# Patient Record
Sex: MALE | Race: WHITE | NOT HISPANIC OR LATINO | Employment: FULL TIME | URBAN - METROPOLITAN AREA
[De-identification: names, ages, dates, MRNs, and addresses within clinical notes are randomized per-mention and may not be internally consistent; named-entity substitution may affect disease eponyms.]

---

## 2017-01-10 ENCOUNTER — GENERIC CONVERSION - ENCOUNTER (OUTPATIENT)
Dept: OTHER | Facility: OTHER | Age: 57
End: 2017-01-10

## 2017-06-07 ENCOUNTER — ALLSCRIPTS OFFICE VISIT (OUTPATIENT)
Dept: OTHER | Facility: OTHER | Age: 57
End: 2017-06-07

## 2017-06-07 DIAGNOSIS — K21.9 GASTRO-ESOPHAGEAL REFLUX DISEASE WITHOUT ESOPHAGITIS: ICD-10-CM

## 2017-06-07 DIAGNOSIS — R07.9 CHEST PAIN: ICD-10-CM

## 2017-06-15 ENCOUNTER — TRANSCRIBE ORDERS (OUTPATIENT)
Dept: ADMINISTRATIVE | Facility: HOSPITAL | Age: 57
End: 2017-06-15

## 2017-06-15 ENCOUNTER — ALLSCRIPTS OFFICE VISIT (OUTPATIENT)
Dept: OTHER | Facility: OTHER | Age: 57
End: 2017-06-15

## 2017-06-15 DIAGNOSIS — R07.9 CHEST PAIN, UNSPECIFIED: Primary | ICD-10-CM

## 2017-06-15 DIAGNOSIS — K21.9 GASTROESOPHAGEAL REFLUX DISEASE WITHOUT ESOPHAGITIS: ICD-10-CM

## 2017-07-10 ENCOUNTER — HOSPITAL ENCOUNTER (OUTPATIENT)
Dept: CT IMAGING | Facility: HOSPITAL | Age: 57
Discharge: HOME | End: 2017-07-10
Payer: COMMERCIAL

## 2017-07-10 DIAGNOSIS — R07.9 CHEST PAIN: ICD-10-CM

## 2017-07-10 DIAGNOSIS — K21.9 GASTRO-ESOPHAGEAL REFLUX DISEASE WITHOUT ESOPHAGITIS: ICD-10-CM

## 2017-07-10 PROCEDURE — 71250 CT THORAX DX C-: CPT

## 2017-07-11 ENCOUNTER — GENERIC CONVERSION - ENCOUNTER (OUTPATIENT)
Dept: OTHER | Facility: OTHER | Age: 57
End: 2017-07-11

## 2017-07-13 ENCOUNTER — GENERIC CONVERSION - ENCOUNTER (OUTPATIENT)
Dept: OTHER | Facility: OTHER | Age: 57
End: 2017-07-13

## 2017-07-17 ENCOUNTER — TRANSCRIBE ORDERS (OUTPATIENT)
Dept: ADMINISTRATIVE | Facility: HOSPITAL | Age: 57
End: 2017-07-17

## 2017-07-17 DIAGNOSIS — R16.1 SPLENOMEGALY, NOT ELSEWHERE CLASSIFIED: ICD-10-CM

## 2017-07-17 DIAGNOSIS — N28.1 ACQUIRED CYST OF KIDNEY: ICD-10-CM

## 2017-07-17 DIAGNOSIS — N28.1 ACQUIRED CYST OF KIDNEY: Primary | ICD-10-CM

## 2017-07-17 DIAGNOSIS — R16.1 SPLENOMEGALY: Primary | ICD-10-CM

## 2017-07-24 ENCOUNTER — HOSPITAL ENCOUNTER (OUTPATIENT)
Dept: CT IMAGING | Facility: HOSPITAL | Age: 57
Discharge: HOME | End: 2017-07-24
Payer: COMMERCIAL

## 2017-07-24 DIAGNOSIS — R16.1 SPLENOMEGALY, NOT ELSEWHERE CLASSIFIED: ICD-10-CM

## 2017-07-24 PROCEDURE — 74177 CT ABD & PELVIS W/CONTRAST: CPT

## 2017-07-24 RX ADMIN — IOHEXOL 100 ML: 350 INJECTION, SOLUTION INTRAVENOUS at 17:19

## 2017-07-26 ENCOUNTER — GENERIC CONVERSION - ENCOUNTER (OUTPATIENT)
Dept: OTHER | Facility: OTHER | Age: 57
End: 2017-07-26

## 2017-07-31 ENCOUNTER — HOSPITAL ENCOUNTER (OUTPATIENT)
Dept: ULTRASOUND IMAGING | Facility: HOSPITAL | Age: 57
Discharge: HOME | End: 2017-07-31
Payer: COMMERCIAL

## 2017-07-31 DIAGNOSIS — N28.1 ACQUIRED CYST OF KIDNEY: ICD-10-CM

## 2017-07-31 PROCEDURE — 76770 US EXAM ABDO BACK WALL COMP: CPT

## 2017-08-04 ENCOUNTER — GENERIC CONVERSION - ENCOUNTER (OUTPATIENT)
Dept: OTHER | Facility: OTHER | Age: 57
End: 2017-08-04

## 2017-08-17 ENCOUNTER — HOSPITAL ENCOUNTER (OUTPATIENT)
Dept: MRI IMAGING | Facility: HOSPITAL | Age: 57
Discharge: HOME | End: 2017-08-17
Payer: COMMERCIAL

## 2017-08-17 DIAGNOSIS — N28.1 ACQUIRED CYST OF KIDNEY: ICD-10-CM

## 2017-08-17 PROCEDURE — A9585 GADOBUTROL INJECTION: HCPCS | Performed by: FAMILY MEDICINE

## 2017-08-17 PROCEDURE — 74183 MRI ABD W/O CNTR FLWD CNTR: CPT

## 2017-08-17 RX ADMIN — GADOBUTROL 9 ML: 604.72 INJECTION INTRAVENOUS at 14:57

## 2017-08-18 ENCOUNTER — GENERIC CONVERSION - ENCOUNTER (OUTPATIENT)
Dept: OTHER | Facility: OTHER | Age: 57
End: 2017-08-18

## 2017-10-17 ENCOUNTER — GENERIC CONVERSION - ENCOUNTER (OUTPATIENT)
Dept: OTHER | Facility: OTHER | Age: 57
End: 2017-10-17

## 2017-11-20 ENCOUNTER — GENERIC CONVERSION - ENCOUNTER (OUTPATIENT)
Dept: OTHER | Facility: OTHER | Age: 57
End: 2017-11-20

## 2017-12-05 ENCOUNTER — GENERIC CONVERSION - ENCOUNTER (OUTPATIENT)
Dept: FAMILY MEDICINE CLINIC | Facility: CLINIC | Age: 57
End: 2017-12-05

## 2017-12-11 ENCOUNTER — TRANSCRIBE ORDERS (OUTPATIENT)
Dept: ADMINISTRATIVE | Facility: HOSPITAL | Age: 57
End: 2017-12-11

## 2017-12-11 ENCOUNTER — ALLSCRIPTS OFFICE VISIT (OUTPATIENT)
Dept: OTHER | Facility: OTHER | Age: 57
End: 2017-12-11

## 2017-12-11 DIAGNOSIS — M10.9 GOUT: ICD-10-CM

## 2017-12-11 DIAGNOSIS — N28.1 ACQUIRED CYST OF KIDNEY: Primary | ICD-10-CM

## 2017-12-11 DIAGNOSIS — N28.1 ACQUIRED CYST OF KIDNEY: ICD-10-CM

## 2017-12-11 DIAGNOSIS — K21.9 GASTRO-ESOPHAGEAL REFLUX DISEASE WITHOUT ESOPHAGITIS: ICD-10-CM

## 2017-12-12 NOTE — PROGRESS NOTES
Assessment    1  Kidney cyst, acquired (593 2) (N28 1)   2  Esophageal reflux (530 81) (K21 9)    Plan  Esophageal reflux    · Follow-up visit in 6 months Evaluation and Treatment  Follow-up  Status: Hold For -Scheduling  Requested for: 87LHA3463   · (1) CBC/PLT/DIFF; Status:Active; Requested for:15Knq3734;    · (1) COMPREHENSIVE METABOLIC PANEL; Status:Active; Requested for:39Ajh6532;   Esophageal reflux, Gout, Kidney cyst, acquired    · (1) LIPID PANEL FASTING W DIRECT LDL REFLEX; Status:Active; Requestedfor:19Pzw9426;    · (1) TSH WITH FT4 REFLEX; Status:Active; Requested for:06Qne8742;    · (1) URIC ACID; Status:Active; Requested for:22Ujj4805;   Kidney cyst, acquired    · * MRI ABDOMEN W WO CONTRAST; Status:Need Information - Financial Authorization; Requested for:50Lem5923;     Chief Complaint  PT here for scheduled follow up, he reports his reflux has improved  PT would like to review last MRI findings and discuss when he should have up dated imaging  refused flu shot,PT aware he is due for CRC screening  PT having updated blood work this week  History of Present Illness  Patient follow-up on cyst on left kidney as well as reflux  Patient's chest pain is better overall  Patient is using AcipHex intermittently  no recurrence of chest pain  Patient urinating normally  No hematuria  No significant change in urinary frequency or dysuria  Patient feeling well overall  Review of Systems   Constitutional: No fever or chills, feels well, no tiredness, no recent weight gain or weight loss  Eyes: No complaints of eye pain, no red eyes, no discharge from eyes, no itchy eyes  ENT: no complaints of earache, no hearing loss, no nosebleeds, no nasal discharge, no sore throat, no hoarseness  Cardiovascular: No complaints of slow heart rate, no fast heart rate, no chest pain, no palpitations, no leg claudication, no lower extremity    Respiratory: No complaints of shortness of breath, no wheezing, no cough, no SOB on exertion, no orthopnea or PND  Gastrointestinal: No complaints of abdominal pain, no constipation, no nausea or vomiting, no diarrhea or bloody stools  Genitourinary: No complaints of dysuria, no incontinence, no hesitancy, no nocturia, no genital lesion, no testicular pain  Musculoskeletal: No complaints of arthralgia, no myalgias, no joint swelling or stiffness, no limb pain or swelling  Integumentary: No complaints of skin rash or skin lesions, no itching, no skin wound, no dry skin  Neurological: No compliants of headache, no confusion, no convulsions, no numbness or tingling, no dizziness or fainting, no limb weakness, no difficulty walking  Psychiatric: Is not suicidal, no sleep disturbances, no anxiety or depression, no change in personality, no emotional problems  Endocrine: No complaints of proptosis, no hot flashes, no muscle weakness, no erectile dysfunction, no deepening of the voice, no feelings of weakness  Hematologic/Lymphatic: No complaints of swollen glands, no swollen glands in the neck, does not bleed easily, no easy bruising  Active Problems  1  Abnormal blood chemistry (790 6) (R79 9)   2  Acid reflux (530 81) (K21 9)   3  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 (723 0)   4  Chest pain (786 50) (R07 9)   5  Esophageal reflux (530 81) (K21 9)   6  Esophagitis, reflux (530 11) (K21 0)   7  Gout (274 9) (M10 9)   8  Kidney cyst, acquired (593 2) (N28 1)   9  Lipoma (214 9) (D17 9)   10  Low vitamin D level (268 9) (E55 9)   11  Need for revaccination (V05 9) (Z23)   12  Splenomegaly (789 2) (R16 1)   13  Testicular hypogonadism (257 2) (E29 1)   14  Wellness examination (V70 0) (Z00 00)    Past Medical History  1  History of chest pain (V13 89) (Z87 898)   2  History of herpes simplex infection (V12 09) (Z86 19)    The active problems and past medical history were reviewed and updated today  Family History  Family History    1  Family history of Cancer   2   Family history of Diabetes Mellitus (V18 0)   3  Family history of Heart Disease (V17 49)   4  Family history of Hypertension (V17 49)   5  Family history of Parkinson Disease    Social History     · Alcohol Use (History)   · Denied: History of Drug Use   · Never A Smoker    Current Meds   1  Adult Aspirin Low Strength 81 MG TBDP; Therapy: (Recorded:02Oct2012) to Recorded   2  Allopurinol 300 MG Oral Tablet; take one tablet every other day; Therapy: 85KUL6890 to (Last Rx:07Jun2017)  Requested for: 07Jun2017; Status: ACTIVE - Transmit to Pharmacy - Awaiting Verification Ordered   3  Mens Multivitamin Plus TABS; TAKE 1 TABLET DAILY; Therapy: 48Jgy7383 to Recorded   4  Pantoprazole Sodium 40 MG Oral Tablet Delayed Release; TAKE 1 TABLET TWICE DAILY 30 MINUTES BEFORE BREAKFAST AND DINNER; Therapy: 53GDP5674 to (Evaluate:48Ctg2786)  Requested for: 02UGG9617; Last Rx:16Jun2017 Ordered   5  Vitamin D3 2000 UNIT Oral Capsule; Therapy: (Recorded:02Oct2012) to Recorded    The medication list was reviewed and updated today  Allergies  1  No Known Drug Allergies    Vitals  Vital Signs    Recorded: 07Jvl0098 03:32PM   Heart Rate 66, R Radial   Respiration 18   Systolic 428, RUE, Sitting   Diastolic 66, RUE, Sitting   Height 5 ft 10 in   Weight 195 lb    BMI Calculated 27 98   BSA Calculated 2 06       Physical Exam   Constitutional  General appearance: No acute distress, well appearing and well nourished  Eyes  Conjunctiva and lids: No swelling, erythema, or discharge  Pupils and irises: Equal, round and reactive to light  Ears, Nose, Mouth, and Throat  External inspection of ears and nose: Normal    Otoscopic examination: Tympanic membrance translucent with normal light reflex  Canals patent without erythema  Nasal mucosa, septum, and turbinates: Normal without edema or erythema  Oropharynx: Normal with no erythema, edema, exudate or lesions  Pulmonary  Respiratory effort: No increased work of breathing or signs of respiratory distress  Auscultation of lungs: Clear to auscultation, equal breath sounds bilaterally, no wheezes, no rales, no rhonci  Cardiovascular  Palpation of heart: Normal PMI, no thrills  Auscultation of heart: Normal rate and rhythm, normal S1 and S2, without murmurs  Examination of extremities for edema and/or varicosities: Normal    Carotid pulses: Normal    Abdomen  Abdomen: Non-tender, no masses  Liver and spleen: No hepatomegaly or splenomegaly  Lymphatic  Palpation of lymph nodes in neck: No lymphadenopathy  Musculoskeletal  Gait and station: Normal    Digits and nails: Normal without clubbing or cyanosis  Inspection/palpation of joints, bones, and muscles: Normal    Skin  Skin and subcutaneous tissue: Normal without rashes or lesions  Neurologic  Cranial nerves: Cranial nerves 2-12 intact  Reflexes: 2+ and symmetric  Sensation: No sensory loss  Psychiatric  Orientation to person, place and time: Normal    Mood and affect: Normal          Results/Data  PHQ-2 Adult Depression Screening 11Lut4154 03:37PM User, s     Test Name Result Flag Reference   PHQ-2 Adult Depression Score 0       Over the last two weeks, how often have you been bothered by any of the following problems?  Little interest or pleasure in doing things: Not at all - 0 Feeling down, depressed, or hopeless: Not at all - 0   PHQ-2 Adult Depression Screening Negative           Signatures   Electronically signed by : Bryson Burgos DO; Dec 11 2017  3:57PM EST                       (Author)

## 2018-01-09 NOTE — RESULT NOTES
Verified Results  * CT ABDOMEN PELVIS W CONTRAST [id number] 04:52PM Catina Navarro Order Number: [id number]    - Patient Instructions: To schedule this appointment, please contact Central Scheduling at [phone]  Test Name Result Flag Reference   CT ABDOMEN PELVIS W CONTRAST (Report)     This is a summary report  The complete report is available in the patient's medical record  If you cannot access the medical record, please contact the sending organization for a detailed fax or copy  CT ABDOMEN AND PELVIS WITH IV CONTRAST     INDICATION: Follow up splenomegaly     COMPARISON: CT of the chest from 7/10/2017     TECHNIQUE: CT examination of the abdomen and pelvis was performed  Reformatted images were created in axial, sagittal, and coronal planes  Radiation dose length product (DLP) for this visit: 1354 34 mGy-cm   This examination, like all CT scans performed in the [facility], was performed utilizing techniques to minimize radiation dose exposure, including the use of    iterative reconstruction and automated exposure control  IV Contrast: 100 mL of iohexol (OMNIPAQUE)      Enteric Contrast: Enteric contrast was administered  FINDINGS:     ABDOMEN     LOWER CHEST: No significant abnormalities identified in the lower chest      LIVER/BILIARY TREE: Unremarkable  GALLBLADDER: No calcified gallstones  No pericholecystic inflammatory change  SPLEEN: The spleen again measures large measuring 14 8 cm  PANCREAS: Unremarkable  ADRENAL GLANDS: Unremarkable  KIDNEYS/URETERS: There are two left renal lesions which measure solid in attenuation though do not changed substantially on delayed images and are possible hemorrhagic cysts  These were not imaged on the prior chest CT  these measure 1 4 x 1 6 cm and    1 0 x 1 1 cm  There are left peripelvic cysts  No hydronephrosis  STOMACH AND BOWEL: There is diverticulosis coli       APPENDIX: No findings to suggest appendicitis  ABDOMINOPELVIC CAVITY: No ascites or free intraperitoneal air  No lymphadenopathy  VESSELS: Unremarkable for patient's age  PELVIS     REPRODUCTIVE ORGANS: Unremarkable for patient's age  URINARY BLADDER: Unremarkable  ABDOMINAL WALL/INGUINAL REGIONS: Unremarkable  OSSEOUS STRUCTURES: No acute fracture or destructive osseous lesion  IMPRESSION:     1  Mild splenomegaly without associated lymphadenopathy or evidence of liver disease  2  Indeterminate lesions in the left kidney, possibly hemorrhagic cysts though confirmation with ultrasound is recommended  3   Diverticulosis coli       ##sigslh##sigslh     ##fuslh01##fuslh01       Workstation performed: [id number]     Signed by:   Buck Mckeon MD   7/26/17   RAD_DOSE   Modality Radiation Exposure Data    Order Radiation    Type Dose Range    Radiation Dose 1354 34 mGy-cm 0 - 6000 mGy-cm

## 2018-01-12 VITALS
TEMPERATURE: 98.4 F | DIASTOLIC BLOOD PRESSURE: 62 MMHG | HEIGHT: 70 IN | WEIGHT: 200.25 LBS | BODY MASS INDEX: 28.67 KG/M2 | SYSTOLIC BLOOD PRESSURE: 110 MMHG

## 2018-01-12 NOTE — PROGRESS NOTES
Assessment    1  Encounter for preventive health examination (V70 0) (Z00 00)   2  Acid reflux (530 81) (K21 9)   3  Lipoma (214 9) (D17 9)    Plan  Acid reflux    · (1) CBC/PLT/DIFF; Status:Active; Requested WMX:97TJT9834;    · (1) COMPREHENSIVE METABOLIC PANEL; Status:Active; Requested PFI:86NPP7197;    · (1) LIPID PANEL FASTING W DIRECT LDL REFLEX; Status:Active; Requested  YLU:49XVN3047;    · (1) TSH WITH FT4 REFLEX; Status:Active; Requested EFP:73DJZ2692;   Esophageal reflux    · RABEprazole Sodium 20 MG Oral Tablet Delayed Release (Aciphex); Take 1  tablet twice daily as needed  Gout    · Allopurinol 300 MG Oral Tablet; take one tablet every other day    Discussion/Summary    Patient will try to avoid coffees and teas, arcelia, high fatty foods, peppermint, chocolate, tomato-based foods, citrus based foods  Patient will restart/continue AcipHex daily  Refills given  Patient will call Dr Everett Cleaves surgeon to have removal of lipoma  Chief Complaint  Patient present's today for annual well exam  complains of chest tighten for 10 days , increased within the last 2 days      History of Present Illness  HPI: Patient is here for wellness exam  Patient has developed some upper chest wall discomfort and neck discomfort along with hoarseness over the past 2 days  Patient has seeing cardiology in the past for other cardiac issues  Patient without shortness of breath, diaphoresis or nausea or vomiting associated with this  Patient notices this at rest but not with exertion  He she and has had full cardiac workup including catheterization which was normal  Patient just restarted AcipHex 2 days ago  Review of Systems    Constitutional: No fever or chills, feels well, no tiredness, no recent weight gain or weight loss  Eyes: No complaints of eye pain, no red eyes, no discharge from eyes, no itchy eyes  ENT: no complaints of earache, no hearing loss, no nosebleeds, no nasal discharge, no sore throat, no hoarseness  Cardiovascular: No complaints of slow heart rate, no fast heart rate, no chest pain, no palpitations, no leg claudication, no lower extremity  Respiratory: No complaints of shortness of breath, no wheezing, no cough, no SOB on exertion, no orthopnea or PND  Gastrointestinal: as noted in HPI  Genitourinary: No complaints of dysuria, no incontinence, no hesitancy, no nocturia, no genital lesion, no testicular pain  Musculoskeletal: No complaints of arthralgia, no myalgias, no joint swelling or stiffness, no limb pain or swelling  Integumentary: No complaints of skin rash or skin lesions, no itching, no skin wound, no dry skin  Neurological: No compliants of headache, no confusion, no convulsions, no numbness or tingling, no dizziness or fainting, no limb weakness, no difficulty walking  Psychiatric: Is not suicidal, no sleep disturbances, no anxiety or depression, no change in personality, no emotional problems  Endocrine: No complaints of proptosis, no hot flashes, no muscle weakness, no erectile dysfunction, no deepening of the voice, no feelings of weakness  Hematologic/Lymphatic: No complaints of swollen glands, no swollen glands in the neck, does not bleed easily, no easy bruising  Active Problems    1  Abnormal blood chemistry (790 6) (R79 9)   2  Acid reflux (530 81) (K21 9)   3  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 (723 0)   4  Chest pain (786 50) (R07 9)   5  Esophageal reflux (530 81) (K21 9)   6  Esophagitis, reflux (530 11) (K21 0)   7  Gout (274 9) (M10 9)   8  Low vitamin D level (268 9) (E55 9)   9  Need for revaccination (V05 9) (Z23)   10  Testicular hypogonadism (257 2) (E29 1)   11   Wellness examination (V70 0) (Z00 00)    Past Medical History    · History of chest pain (V13 89) (S21 724)   · History of herpes simplex infection (V12 09) (Z86 19)    Family History  Family History    · Family history of Cancer   · Family history of Diabetes Mellitus (V18 0)   · Family history of Heart Disease (V17 49)   · Family history of Hypertension (V17 49)   · Family history of Parkinson Disease    Social History    · Alcohol Use (History)   · Denied: History of Drug Use   · Never A Smoker    Current Meds   1  Adult Aspirin Low Strength 81 MG TBDP; Therapy: (Recorded:02Oct2012) to Recorded   2  Allopurinol 300 MG Oral Tablet; take one tablet every other day; Therapy: 63PUX3342 to (Last Rx:09Jun2016)  Requested for: 44AGU1044 Ordered   3  Mens Multivitamin Plus TABS; TAKE 1 TABLET DAILY; Therapy: 61Yqv2722 to Recorded   4  RABEprazole Sodium 20 MG Oral Tablet Delayed Release; Take 1 tablet twice daily as   needed; Therapy: 96ZPY8262 to (Evaluate:10Aug2015)  Requested for: 27SHM3954 Recorded   5  Vitamin D3 2000 UNIT Oral Capsule; Therapy: (Recorded:02Oct2012) to Recorded    Allergies    1  No Known Drug Allergies    Vitals   Recorded: 51XUA6697 04:12PM   Temperature 98 4 F, Tympanic   Systolic 759, LUE, Sitting   Diastolic 62, LUE, Sitting   Height 5 ft 10 in   Weight 200 lb 4 oz   BMI Calculated 28 73   BSA Calculated 2 09     Physical Exam    Constitutional   General appearance: No acute distress, well appearing and well nourished  Eyes   Conjunctiva and lids: No swelling, erythema, or discharge  Pupils and irises: Equal, round and reactive to light  Ears, Nose, Mouth, and Throat   External inspection of ears and nose: Normal     Otoscopic examination: Tympanic membrance translucent with normal light reflex  Canals patent without erythema  Oropharynx: Normal with no erythema, edema, exudate or lesions  Pulmonary   Respiratory effort: No increased work of breathing or signs of respiratory distress  Auscultation of lungs: Clear to auscultation  Cardiovascular   Palpation of heart: Normal PMI, no thrills  Auscultation of heart: Normal rate and rhythm, normal S1 and S2, without murmurs      Examination of extremities for edema and/or varicosities: Normal     Abdomen Abdomen: Non-tender, no masses  Liver and spleen: No hepatomegaly or splenomegaly  Lymphatic   Palpation of lymph nodes in neck: No lymphadenopathy  Musculoskeletal   Gait and station: Normal     Digits and nails: Normal without clubbing or cyanosis  Inspection/palpation of joints, bones, and muscles: Normal     Skin   Skin and subcutaneous tissue: Abnormal   Lipoma left arm  Neurologic   Cranial nerves: Cranial nerves 2-12 intact  Reflexes: 2+ and symmetric  Sensation: No sensory loss      Psychiatric   Orientation to person, place and time: Normal     Mood and affect: Normal        Signatures   Electronically signed by : Devi Marie DO; Jun 7 2017  4:55PM EST                       (Author)

## 2018-01-14 VITALS
SYSTOLIC BLOOD PRESSURE: 124 MMHG | DIASTOLIC BLOOD PRESSURE: 72 MMHG | HEIGHT: 70 IN | BODY MASS INDEX: 28.64 KG/M2 | TEMPERATURE: 97.8 F | WEIGHT: 200.06 LBS

## 2018-01-15 ENCOUNTER — HOSPITAL ENCOUNTER (OUTPATIENT)
Dept: MRI IMAGING | Facility: HOSPITAL | Age: 58
Discharge: HOME | End: 2018-01-15
Payer: COMMERCIAL

## 2018-01-15 ENCOUNTER — GENERIC CONVERSION - ENCOUNTER (OUTPATIENT)
Dept: OTHER | Facility: OTHER | Age: 58
End: 2018-01-15

## 2018-01-15 DIAGNOSIS — N28.1 ACQUIRED CYST OF KIDNEY: ICD-10-CM

## 2018-01-15 PROCEDURE — 74183 MRI ABD W/O CNTR FLWD CNTR: CPT

## 2018-01-15 PROCEDURE — A9585 GADOBUTROL INJECTION: HCPCS | Performed by: RADIOLOGY

## 2018-01-15 RX ADMIN — GADOBUTROL 10 ML: 604.72 INJECTION INTRAVENOUS at 09:10

## 2018-01-15 NOTE — RESULT NOTES
Verified Results  [address] [place] [address] [place] [id number] 01:32PM Sylvia Kehr Order Number: [id number]    - Patient Instructions: To schedule this appointment, please contact Central Scheduling at [phone]  Test Name Result Flag Reference   US KIDNEY AND BLADDER (Report)     RENAL ULTRASOUND     INDICATION: Follow-up left kidney cyst seen on CT on 7/24/2017  COMPARISON: Abdomen and pelvic CT from 7/24/2017  TECHNIQUE:  Ultrasound of the retroperitoneum was performed with a curvilinear transducer utilizing volumetric sweeps and still imaging techniques  FINDINGS:     KIDNEYS:   Symmetric and normal size  Right kidney: 10 9 cm  Normal echogenicity and contour  No suspicious masses detected  No hydronephrosis  No shadowing calculi  No perinephric fluid collections  Left kidney: 12 4 cm  Normal echogenicity and contour  One of the indeterminate lesions seen in the left in the lower pole described on prior CT is a 1 8 x 1 7 x 1 5 cm hyperechoic lesion (image [id number]) while the other is a 1 1 x 1 3 x 1 0 cm simple cyst      No hydronephrosis  No shadowing calculi  No perinephric fluid collections  URETERS:   Nonvisualized  BLADDER:    Normally distended  No focal thickening or mass lesions  Bilateral ureteral jets detected  IMPRESSION:     One of the left kidney lower pole lesions described on the prior CT is a simple cyst, but the other lesion is a 1 8 x 1 7 x 1 5 cm lesion that is hyperechoic on ultrasound therefore remains indeterminate  (image [id number]) Recommend MRI of the abdomen with    and without IV contrast to characterize         ##sigslh##sigslh            Workstation performed: [id number]     Signed by:   Poppy Elliott MD   8/3/17

## 2018-01-16 NOTE — RESULT NOTES
Verified Results  * MRI ABDOMEN W WO CONTRAST [id number] 01:18PM Allen Gonzalez Order Number: [id number]   Performing Comments: R/O CANCER PER U/S RESULTS   - Patient Instructions: To schedule this appointment, please contact Central Scheduling at [phone]  Test Name Result Flag Reference   MRI ABDOMEN W [address] (Report)     This is a summary report  The complete report is available in the patient's medical record  If you cannot access the medical record, please contact the sending organization for a detailed fax or copy  MRI OF THE ABDOMEN (KIDNEYS) WITH AND WITHOUT CONTRAST     INDICATION: Follow-up renal lesions  COMPARISON: Prior ultrasound dated July 31, 2017  Prior CT dated July 24, 2017     TECHNIQUE: The following pulse sequences were obtained on a 1 5 T scanner: Coronal and axial T2 with TE of 90 and 180 respectively, axial T2 with fat saturation, axial FIESTA fat-sat, axialT1-weighted in-and-out-of phase, pre-contrast axial T1 with fat   saturation, thin-section post-contrast dynamic axial T1 with fat saturation at 20, 70, and 180 seconds, followed by coronal T1 with fat saturation and 7-minute delayed axial T1 with fat saturation  Subtraction images of the kidneys were obtained  IV Contrast: 9 mL of gadobutrol injection (MULTI-DOSE)      FINDINGS:     KIDNEYS:    Right:   Normal in size and contour with prompt enhancement  No hydronephrosis  No cystic or solid renal mass identified  Left:   Normal in size and contour with prompt enhancement  There are numerous parapelvic cysts without enhancement  No hydronephrosis  There are two lesions observed arising from the lateral lower pole cortex as follows:     1  The smaller more exophytic and more anteriorly located lesion currently measures 1 2 cm and demonstrates increased T2 and mildly increased T1 signal with some layering of higher T1 signal dependently and demonstrates no enhancement   This lesion    appeared more cystic on the prior ultrasound  This is compatible with a hemorrhagic or proteinaceous cyst      2  The larger, less exophytic, and more posteriorly located lesion measures 1 4 cm on current study and demonstrates heterogeneous T2 signal, mostly diminished T2 signal and intermediate T1 signal similar to background renal parenchyma prior to contrast   administration  After contrast administration, there is no appreciable enhancement demonstrated  No intralesional fat is demonstrated  This lesion appeared more echogenic on the prior ultrasound  LIVER: Normal      BILIARY TREE: Normal       GALLBLADDER: Normal      PANCREAS: Normal      ADRENAL GLANDS: Normal      SPLEEN: Enlarged measuring between 14 5 cm     ABDOMINAL CAVITY: No lymphadenopathy or mass  BOWEL: Colonic diverticulosis without evidence of diverticulitis  OSSEOUS STRUCTURES: No osseous destruction  VASCULAR STRUCTURES: Visualized vasculature is normal      ABDOMINAL WALL: Small fat-containing umbilical hernia  LUNG BASES: Atelectatic changes both lung bases  IMPRESSION:   There are multiple left-sided renal lesions including multiple parapelvic cysts and a lower pole exophytic hemorrhagic/proteinaceous cyst      The more posteriorly located, slightly larger, and less exophytic left lower pole renal lesion has indeterminate precontrast imaging characteristics but does not demonstrate any significant enhancement and therefore is very likely benign  Short interval   follow-up recommended in 4-6 months either via CT or preferably MRI to document stability  ##sigslh##sigslh   ##fuslh6##fuslh6        Workstation performed: [id number]     Signed by:    Debbie Chamberlain DO   8/18/17       Signatures   Electronically signed by : DAVE Horta ; Aug 18 2017  4:12PM EST                       (Author)

## 2018-01-23 ENCOUNTER — ALLSCRIPTS OFFICE VISIT (OUTPATIENT)
Dept: OTHER | Facility: OTHER | Age: 58
End: 2018-01-23

## 2018-01-23 VITALS
SYSTOLIC BLOOD PRESSURE: 126 MMHG | HEIGHT: 70 IN | RESPIRATION RATE: 18 BRPM | HEART RATE: 66 BPM | BODY MASS INDEX: 27.92 KG/M2 | DIASTOLIC BLOOD PRESSURE: 66 MMHG | WEIGHT: 195 LBS

## 2018-01-23 DIAGNOSIS — M54.50 LOW BACK PAIN: ICD-10-CM

## 2018-01-23 NOTE — RESULT NOTES
Verified Results  * MRI ABDOMEN W WO CONTRAST [id number] 07:53AM Oxana Olvera Order Number: [id number]    - Patient Instructions: To schedule this appointment, please contact Central Scheduling at [phone]  Test Name Result Flag Reference   MRI ABDOMEN W WO CONTRAST (Report)     MRI OF THE ABDOMEN (KIDNEYS) WITH AND WITHOUT CONTRAST     INDICATION: Dx: Acquired cyst of kidney [N28 1 (ICD-10-CM)]     COMPARISON: Comparison primarily made with MR from 8/17/2017  Abdomen and pelvic CT from 7/24/2017 was also reviewed  TECHNIQUE: The following pulse sequences were obtained on a 1 5 T scanner: Coronal and axial T2 with TE of 90 and 180 respectively, axial T2 with fat saturation, axial FIESTA fat-sat, axialT1-weighted in-and-out-of phase, pre-contrast axial T1 with fat   saturation, thin-section post-contrast dynamic axial T1 with fat saturation at 20, 70, and 180 seconds, followed by coronal T1 with fat saturation and 7-minute delayed axial T1 with fat saturation  Subtraction images of the kidneys were obtained  IV Contrast: 10 mL of gadobutrol injection (MULTI-DOSE)      FINDINGS:     KIDNEYS:    Right:   Normal in size and contour with prompt enhancement  No hydronephrosis  No cystic or solid renal mass identified  Left:   Normal in size and contour with prompt enhancement  No hydronephrosis  Again seen are 2 mildly complex left renal lesions  The 1st is a 1 3 cm exophytic lesion of the left kidney lower pole, which is T2 hyperintense, T1 hyperintense, and does not enhance  This is unchanged in size  (Series 6 image 30 )     The 2nd is also a 1 4 cm left kidney lower pole lesion, located immediately posterior to the previously described cyst  This is hypointense on T2-weighted sequences, hypointense on T1-weighted sequences, and does not enhance  This is also unchanged in    size   (Series 6 image 30 )     LIVER: Normal      BILIARY TREE: Normal       GALLBLADDER: Normal  PANCREAS: Normal      ADRENAL GLANDS: Normal      SPLEEN: Normal      ABDOMINAL CAVITY: No lymphadenopathy or mass  BOWEL: Unremarkable MRI appearance  OSSEOUS STRUCTURES: No osseous destruction  VASCULAR STRUCTURES: Visualized vasculature is normal      ABDOMINAL WALL: Normal      LUNG BASES: Unremarkable MRI appearance  IMPRESSION:     Both left kidney lower pole minimally complex cysts are stable and do no enhance  These are both consistent with hemorrhagic or proteinaceous cysts  There is no need for further followup or workup         Workstation performed: [id number]     Signed by:   Ellie Molina MD   1/15/18

## 2018-01-24 NOTE — PROGRESS NOTES
Assessment   1  Chronic lumbar pain (724 2,338 29) (M54 5,G89 29)    Plan   Chronic lumbar pain    · Cyclobenzaprine HCl - 10 MG Oral Tablet; TAKE 1 TABLET AT BEDTIME   · Meloxicam 15 MG Oral Tablet; TAKE 1 TABLET DAILY WITH FOOD   · *1 - [facility]  *  Status: Active  Requested for: 58DAZ4388  Care Summary provided  : Yes   · Follow-up visit in 6 weeks Evaluation and Treatment  Follow-up  Status: Hold For -    Scheduling  Requested for: 17PJU0149    Chief Complaint   PT in office today , complaints of sudden flair up of lumbar pain and getting to a standing position, No fall or injury related  History of Present Illness   Patient With lumbar pain for 1-2 months  the normal urination defecation  No fevers or rash  patient will use Aleve intermittently  mild tightness continuously      Review of Systems        Constitutional: No fever or chills, feels well, no tiredness, no recent weight gain or weight loss  Eyes: No complaints of eye pain, no red eyes, no discharge from eyes, no itchy eyes  ENT: no complaints of earache, no hearing loss, no nosebleeds, no nasal discharge, no sore throat, no hoarseness  Cardiovascular: No complaints of slow heart rate, no fast heart rate, no chest pain, no palpitations, no leg claudication, no lower extremity  Respiratory: No complaints of shortness of breath, no wheezing, no cough, no SOB on exertion, no orthopnea or PND  Gastrointestinal: No complaints of abdominal pain, no constipation, no nausea or vomiting, no diarrhea or bloody stools  Genitourinary: No complaints of dysuria, no incontinence, no hesitancy, no nocturia, no genital lesion, no testicular pain  Musculoskeletal: as noted in HPI  Integumentary: No complaints of skin rash or skin lesions, no itching, no skin wound, no dry skin        Neurological: No compliants of headache, no confusion, no convulsions, no numbness or tingling, no dizziness or fainting, no limb weakness, no difficulty walking  Psychiatric: Is not suicidal, no sleep disturbances, no anxiety or depression, no change in personality, no emotional problems  Endocrine: No complaints of proptosis, no hot flashes, no muscle weakness, no erectile dysfunction, no deepening of the voice, no feelings of weakness  Hematologic/Lymphatic: No complaints of swollen glands, no swollen glands in the neck, does not bleed easily, no easy bruising  Active Problems   1  Abnormal blood chemistry (790 6) (R79 9)   2  Acid reflux (530 81) (K21 9)   3  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 (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 0)   4  Chest pain (786 50) (R07 9)   5  Esophageal reflux (530 81) (K21 9)   6  Esophagitis, reflux (530 11) (K21 0)   7  Gout (274 9) (M10 9)   8  Kidney cyst, acquired (593 2) (N28 1)   9  Lipoma (214 9) (D17 9)   10  Low vitamin D level (268 9) (E55 9)   11  Need for revaccination (V05 9) (Z23)   12  Splenomegaly (789 2) (R16 1)   13  Testicular hypogonadism (257 2) (E29 1)   14  Wellness examination (V70 0) (Z00 00)    Past Medical History   1  History of chest pain (V13 89) (Z87 898)   2  History of herpes simplex infection (V12 09) (Z86 19)     The active problems and past medical history were reviewed and updated today  Family History   Family History    1  Family history of Cancer   2  Family history of Diabetes Mellitus (V18 0)   3  Family history of Heart Disease (V17 49)   4  Family history of Hypertension (V17 49)   5  Family history of Parkinson Disease    Social History    · Alcohol Use (History)   · Denied: History of Drug Use   · Never A Smoker    Current Meds    1  Adult Aspirin Low Strength 81 MG TBDP; Therapy: (Recorded:02Oct2012) to Recorded   2  Allopurinol 300 MG Oral Tablet; take one tablet every other day; Therapy: 17NDP0052 to (Last Rx:07Jun2017)  Requested for: 07Jun2017; Status: ACTIVE     - Transmit to Pharmacy - Awaiting Verification Ordered   3   Mens Multivitamin Plus TABS; TAKE 1 TABLET DAILY; Therapy: 08Hbs5975 to Recorded   4  Pantoprazole Sodium 40 MG Oral Tablet Delayed Release; TAKE 1 TABLET TWICE DAILY     30 MINUTES BEFORE BREAKFAST AND DINNER; Therapy: 14OIF7982 to (Evaluate:35Nfa4321)  Requested for: 74OGA3728; Last     Rx:78Enf1650 Ordered   5  Vitamin D3 2000 UNIT Oral Capsule; Therapy: (Recorded:02Oct2012) to Recorded     The medication list was reviewed and updated today  Allergies   1  No Known Drug Allergies    Vitals   Vital Signs    Recorded: 61ZGG5196 03:05PM   Heart Rate 66, R Radial   Respiration 16   Systolic 517, LUE, Sitting   Diastolic 68, LUE, Sitting   Height 5 ft 10 in   Weight 196 lb    BMI Calculated 28 12   BSA Calculated 2 07     Physical Exam        Constitutional      General appearance: No acute distress, well appearing and well nourished  Eyes      Conjunctiva and lids: No swelling, erythema, or discharge  Pupils and irises: Equal, round and reactive to light  Ears, Nose, Mouth, and Throat      External inspection of ears and nose: Normal        Otoscopic examination: Tympanic membrance translucent with normal light reflex  Canals patent without erythema  Nasal mucosa, septum, and turbinates: Normal without edema or erythema  Oropharynx: Normal with no erythema, edema, exudate or lesions  Pulmonary      Respiratory effort: No increased work of breathing or signs of respiratory distress  Auscultation of lungs: Clear to auscultation, equal breath sounds bilaterally, no wheezes, no rales, no rhonci  Cardiovascular      Palpation of heart: Normal PMI, no thrills  Auscultation of heart: Normal rate and rhythm, normal S1 and S2, without murmurs  Examination of extremities for edema and/or varicosities: Normal        Carotid pulses: Normal        Abdomen      Abdomen: Non-tender, no masses  Liver and spleen: No hepatomegaly or splenomegaly         Lymphatic Palpation of lymph nodes in neck: No lymphadenopathy  Musculoskeletal      Gait and station: Normal        Digits and nails: Normal without clubbing or cyanosis  Inspection/palpation of joints, bones, and muscles: Normal  -- patient with some paravertebral muscle spasm bilaterally thoracolumbar region  No pain with palpation in the thoracolumbar region  Negative straight leg raise bilaterally  Skin      Skin and subcutaneous tissue: Normal without rashes or lesions  Neurologic      Cranial nerves: Cranial nerves 2-12 intact  Reflexes: 2+ and symmetric  Sensation: No sensory loss         Psychiatric      Orientation to person, place and time: Normal        Mood and affect: Normal           Future Appointments      Date/Time Provider Specialty Site   06/11/2018 03:15 PM Gómez Davis DO Family Medicine  [facility]     Signatures    Electronically signed by : Ralf Lamb DO; Jan 23 2018  3:19PM EST                       (Author)

## 2018-01-29 ENCOUNTER — EVALUATION (OUTPATIENT)
Dept: PHYSICAL THERAPY | Facility: CLINIC | Age: 58
End: 2018-01-29
Payer: COMMERCIAL

## 2018-01-29 DIAGNOSIS — M54.5 ACUTE LOW BACK PAIN, UNSPECIFIED BACK PAIN LATERALITY, WITH SCIATICA PRESENCE UNSPECIFIED: Primary | ICD-10-CM

## 2018-01-29 PROCEDURE — 97162 PT EVAL MOD COMPLEX 30 MIN: CPT | Performed by: PHYSICAL THERAPIST

## 2018-01-29 PROCEDURE — G8990 OTHER PT/OT CURRENT STATUS: HCPCS | Performed by: PHYSICAL THERAPIST

## 2018-01-29 PROCEDURE — 97140 MANUAL THERAPY 1/> REGIONS: CPT | Performed by: PHYSICAL THERAPIST

## 2018-01-29 PROCEDURE — 97110 THERAPEUTIC EXERCISES: CPT | Performed by: PHYSICAL THERAPIST

## 2018-01-29 PROCEDURE — G8991 OTHER PT/OT GOAL STATUS: HCPCS | Performed by: PHYSICAL THERAPIST

## 2018-01-29 RX ORDER — CYCLOBENZAPRINE HCL 10 MG
10 TABLET ORAL
COMMUNITY
End: 2018-02-20 | Stop reason: ALTCHOICE

## 2018-01-29 RX ORDER — MELOXICAM 15 MG/1
15 TABLET ORAL DAILY
COMMUNITY
End: 2019-05-14 | Stop reason: ALTCHOICE

## 2018-01-29 NOTE — PROGRESS NOTES
PT Evaluation     Today's date: 2018  Patient name: Jose Juan Tillman  : 1960  MRN: [id number]  Referring provider: Tony Boateng DO  Dx:   Encounter Diagnosis   Name Primary?  Chronic low back pain, unspecified back pain laterality, with sciatica presence unspecified Yes                  Assessment  Impairments: abnormal or restricted ROM, impaired physical strength, lacks appropriate home exercise program and pain with function  Other impairment: soft tissue restrictions and muscle imbalances    Patient is irritable  Assessment details: Patient is a 65year old male who presents to PT with acute LBP  Patient would benefit from skilled PT intervention to address his impairments and to maximize function  Barriers to therapy: none  Understanding of Dx/Px/POC: good   Prognosis: good    Goals  STG;  1: decrease pain by 50% in 4 weeks  2: full pain free ROM in 4 weeks    LT- sit to stand without pain in 4-8 weeks  2- return to working out and running without pain in 8 weeks        Subjective Evaluation    History of Present Illness  Mechanism of injury: Patient reports a 2-3 month history of lower back pain  Pain is described as a tightness/tension  Primarily located on the right side  Increase with sit to stand transfer but then soon resolves  Difficulty at times with sleeping  Hernia surgery 15 years ago     Not a recurrent problem Quality of life: good    Pain  Current pain rating: 3  At best pain ratin  At worst pain ratin  Quality: dull ache  Relieving factors: change in position  Aggravating factors: sitting  Progression: no change      Diagnostic Tests  No diagnostic tests performed  Treatments  Previous treatment: medication  Current treatment: medication  Patient Goals  Patient goals for therapy: increased strength, decreased pain, independence with ADLs/IADLs and return to sport/leisure activities          Objective     Static Posture     Comments  Posture: left illiac crest higher on left  Lumbar AROM: WNL  Double leg squat: WNL  MMT: 3+/5 left hip ER   (-) slump test b/l  (-) SLR test on right - increased posterior compartment tightness noted  Limited hip IR on right  Severe tenderness to palpation right psoas, b/l abdominal/visceral adhesion left upper and right lower   Left Rectus femoris tightness, right piriformis tightness   Lacking >45 degrees hip extension b/l   Poor core strength      Precautions: none    Daily Treatment Diary     Manual  1/29                         STM to viscera and right psoas 10 min                                                       Exercise Diary                           Hip flexor stretch at EOT  Standing piriformis stretch 2 min b/l  2 min b/l                                                                                                                                                                                                                                                          Modalities                                                               Flowsheet Rows    Flowsheet Row Most Recent Value   PT G-Codes   Current Score  57   Projected Score  72   FOTO information reviewed  Yes   Assessment Type  Evaluation   G code set  Other PT Primary   Other PT Primary Current Status ()  CK   Other PT Primary Goal Status ()  CJ

## 2018-01-30 ENCOUNTER — APPOINTMENT (OUTPATIENT)
Dept: PHYSICAL THERAPY | Facility: CLINIC | Age: 58
End: 2018-01-30
Payer: COMMERCIAL

## 2018-02-01 ENCOUNTER — OFFICE VISIT (OUTPATIENT)
Dept: PHYSICAL THERAPY | Facility: CLINIC | Age: 58
End: 2018-02-01
Payer: COMMERCIAL

## 2018-02-01 DIAGNOSIS — M54.5 ACUTE LOW BACK PAIN, UNSPECIFIED BACK PAIN LATERALITY, WITH SCIATICA PRESENCE UNSPECIFIED: Primary | ICD-10-CM

## 2018-02-01 PROCEDURE — 97140 MANUAL THERAPY 1/> REGIONS: CPT | Performed by: PHYSICAL THERAPIST

## 2018-02-01 PROCEDURE — 97110 THERAPEUTIC EXERCISES: CPT | Performed by: PHYSICAL THERAPIST

## 2018-02-01 PROCEDURE — 97010 HOT OR COLD PACKS THERAPY: CPT | Performed by: PHYSICAL THERAPIST

## 2018-02-01 NOTE — PROGRESS NOTES
Daily Note     Today's date: 2018  Patient name: Jose Juan Tillman  : 1960  MRN: [id number]  Referring provider: Tony Boateng DO  Dx:   Encounter Diagnosis   Name Primary?  Acute low back pain, unspecified back pain laterality, with sciatica presence unspecified Yes                  Subjective: Reports sleeping much better since last tx session  Objective: See treatment diary below      Assessment: Tolerated treatment well  Patient would benefit from continued PT      Plan: Progress treatment as tolerated           Manual                          STM to viscera and right psoas 10 min 15 min                                                      Exercise Diary                           Hip flexor stretch at EOT   2 min b/l   2 min  b/l           Standing piriformis stretch 2 min b/l 2 min b/l           Prone RF stretch with towel roll under knee  3 min each                                                                                                                                                                                                                               Modalities                           MHP 10 min  10 min pre tx

## 2018-02-05 ENCOUNTER — OFFICE VISIT (OUTPATIENT)
Dept: PHYSICAL THERAPY | Facility: CLINIC | Age: 58
End: 2018-02-05
Payer: COMMERCIAL

## 2018-02-05 DIAGNOSIS — M54.5 ACUTE LOW BACK PAIN, UNSPECIFIED BACK PAIN LATERALITY, WITH SCIATICA PRESENCE UNSPECIFIED: Primary | ICD-10-CM

## 2018-02-05 PROCEDURE — 97010 HOT OR COLD PACKS THERAPY: CPT | Performed by: PHYSICAL THERAPIST

## 2018-02-05 PROCEDURE — 97140 MANUAL THERAPY 1/> REGIONS: CPT | Performed by: PHYSICAL THERAPIST

## 2018-02-05 PROCEDURE — 97110 THERAPEUTIC EXERCISES: CPT | Performed by: PHYSICAL THERAPIST

## 2018-02-05 NOTE — PROGRESS NOTES
Daily Note     Today's date: 2018  Patient name: Kate Bunn  : 1960  MRN: [id number]  Referring provider: Ellyn Gonzalez DO  Dx:   Encounter Diagnosis   Name Primary?  Acute low back pain, unspecified back pain laterality, with sciatica presence unspecified Yes                  Subjective: Reports steady improvement each session      Objective: See treatment diary below      Assessment: Tolerated treatment well  Patient would benefit from continued PT      Plan: Progress treatment as tolerated           Manual   2                       STM to viscera and right psoas 10 min 15 min 15 min          Hip IR stretching b/l   5 min                                        Exercise Diary                           Hip flexor stretch at EOT   2 min b/l   2 min  b/l 2 min  b/l          Standing piriformis stretch 2 min b/l 2 min b/l 2 min b/l          Prone RF stretch with towel roll under knee  3 min each 3 min each                                                                                                                                                                                                                              Modalities                           MHP 10 min  10 min pre tx

## 2018-02-06 ENCOUNTER — OFFICE VISIT (OUTPATIENT)
Dept: PHYSICAL THERAPY | Facility: CLINIC | Age: 58
End: 2018-02-06
Payer: COMMERCIAL

## 2018-02-06 DIAGNOSIS — M54.5 ACUTE LOW BACK PAIN, UNSPECIFIED BACK PAIN LATERALITY, WITH SCIATICA PRESENCE UNSPECIFIED: Primary | ICD-10-CM

## 2018-02-06 PROCEDURE — 97010 HOT OR COLD PACKS THERAPY: CPT

## 2018-02-06 PROCEDURE — 97140 MANUAL THERAPY 1/> REGIONS: CPT

## 2018-02-06 PROCEDURE — 97110 THERAPEUTIC EXERCISES: CPT

## 2018-02-06 NOTE — PROGRESS NOTES
Daily Note     Today's date: 2018  Patient name: Thiago Beltran  : 1960  MRN: [id number]  Referring provider: Eric Mackenzie DO  Dx:   Encounter Diagnosis   Name Primary?  Acute low back pain, unspecified back pain laterality, with sciatica presence unspecified Yes                  Subjective: Pt reports low back stiffness  Cont'd c/o LE paresthesias and "locking/popping" of left hip with prolonged sitting  Objective: See treatment diary below      Assessment: Pt responding well to TE and manual therapy  B/l hip mobility slowly improving  Patient would benefit from continued PT      Plan: Progress treatment as tolerated           Manual                        STM to viscera and right psoas 10 min 15 min 15 min 15 min performed by PT          Hip IR stretching b/l   5 min 5 min                                       Exercise Diary                           Hip flexor stretch at EOT   2 min b/l   2 min  b/l 2 min  b/l 2 min b/l         Standing piriformis stretch 2 min b/l 2 min b/l 2 min b/l 2 min b/l          Prone RF stretch with towel roll under knee  3 min each 3 min each 3 min ea                                                                                                                                                                                                                              Modalities                           MHP 10 min  10 min pre tx  10 min pre tx

## 2018-02-08 ENCOUNTER — OFFICE VISIT (OUTPATIENT)
Dept: PHYSICAL THERAPY | Facility: CLINIC | Age: 58
End: 2018-02-08
Payer: COMMERCIAL

## 2018-02-08 DIAGNOSIS — M54.5 ACUTE LOW BACK PAIN, UNSPECIFIED BACK PAIN LATERALITY, WITH SCIATICA PRESENCE UNSPECIFIED: Primary | ICD-10-CM

## 2018-02-08 PROCEDURE — 97110 THERAPEUTIC EXERCISES: CPT | Performed by: PHYSICAL THERAPIST

## 2018-02-08 PROCEDURE — 97010 HOT OR COLD PACKS THERAPY: CPT | Performed by: PHYSICAL THERAPIST

## 2018-02-08 NOTE — PROGRESS NOTES
Daily Note     Today's date: 2018  Patient name: Mak Pierson  : 1960  MRN: [id number]  Referring provider: Tiffanie Hartley DO  Dx:   Encounter Diagnosis   Name Primary?  Acute low back pain, unspecified back pain laterality, with sciatica presence unspecified Yes                  Subjective: Pt reports increased abdominal muscle soreness and discomfort in his lower back    Objective: See treatment diary below      Assessment: Held MT today and added additional LE stretches  Patient would benefit from continued PT      Plan: Progress treatment as tolerated           Manual                       STM to viscera and right psoas 10 min 15 min 15 min 15 min performed by PT  np        Hip IR stretching b/l   5 min 5 min np                                      Exercise Diary                           Hip flexor stretch at EOT   2 min b/l   2 min  b/l 2 min  b/l 2 min b/l 3 min b/l        Standing piriformis stretch 2 min b/l 2 min b/l 2 min b/l 2 min b/l  2 min b/l        Prone RF stretch with towel roll under knee  3 min each 3 min each 3 min ea  5 min b/l        Prone RF stretch at EOT     2 min each                                                                                                                                                                                                               Modalities                           MHP   10 min pre tx  10 min pre tx 10 min

## 2018-02-12 ENCOUNTER — OFFICE VISIT (OUTPATIENT)
Dept: PHYSICAL THERAPY | Facility: CLINIC | Age: 58
End: 2018-02-12
Payer: COMMERCIAL

## 2018-02-12 DIAGNOSIS — M54.5 ACUTE LOW BACK PAIN, UNSPECIFIED BACK PAIN LATERALITY, WITH SCIATICA PRESENCE UNSPECIFIED: Primary | ICD-10-CM

## 2018-02-12 PROCEDURE — 97110 THERAPEUTIC EXERCISES: CPT | Performed by: PHYSICAL THERAPIST

## 2018-02-12 PROCEDURE — 97010 HOT OR COLD PACKS THERAPY: CPT | Performed by: PHYSICAL THERAPIST

## 2018-02-12 PROCEDURE — 97140 MANUAL THERAPY 1/> REGIONS: CPT | Performed by: PHYSICAL THERAPIST

## 2018-02-12 NOTE — PROGRESS NOTES
Daily Note     Today's date: 2018  Patient name: Jesika Timmons  : 1960  MRN: [id number]  Referring provider: Marisol Mariscal DO  Dx:   Encounter Diagnosis   Name Primary?  Acute low back pain, unspecified back pain laterality, with sciatica presence unspecified Yes                  Subjective: Pt reports feeling much better over the weekend    Objective: See treatment diary below      Assessment: Responding well overall  Patient would benefit from continued PT      Plan: Progress treatment as tolerated           Manual                      STM to viscera and right psoas 10 min 15 min 15 min 15 min performed by PT  np 15 min b/l       Hip IR stretching b/l   5 min 5 min np 5 min                                     Exercise Diary                         Hip flexor stretch at EOT   2 min b/l   2 min  b/l 2 min  b/l 2 min b/l 3 min b/l 3 min b/l       Standing piriformis stretch 2 min b/l 2 min b/l 2 min b/l 2 min b/l  2 min b/l 2 min b/l       Prone RF stretch with towel roll under knee  3 min each 3 min each 3 min ea  5 min b/l 5 min b/l       Prone RF stretch at EOT     2 min each 2 min each                                                                                                                                                                                                              Modalities                         MHP   10 min pre tx  10 min pre tx 10 min 10 min

## 2018-02-13 ENCOUNTER — OFFICE VISIT (OUTPATIENT)
Dept: PHYSICAL THERAPY | Facility: CLINIC | Age: 58
End: 2018-02-13
Payer: COMMERCIAL

## 2018-02-13 DIAGNOSIS — M54.5 ACUTE LOW BACK PAIN, UNSPECIFIED BACK PAIN LATERALITY, WITH SCIATICA PRESENCE UNSPECIFIED: Primary | ICD-10-CM

## 2018-02-13 PROCEDURE — 97010 HOT OR COLD PACKS THERAPY: CPT

## 2018-02-13 PROCEDURE — 97110 THERAPEUTIC EXERCISES: CPT

## 2018-02-13 PROCEDURE — 97140 MANUAL THERAPY 1/> REGIONS: CPT

## 2018-02-13 NOTE — PROGRESS NOTES
Daily Note     Today's date: 2018  Patient name: Iram Funk  : 1960  MRN: [id number]  Referring provider: Sylvain Goodman DO  Dx:   Encounter Diagnosis   Name Primary?  Acute low back pain, unspecified back pain laterality, with sciatica presence unspecified Yes                  Subjective: Pt reports minimal low back soreness  Objective: See treatment diary below      Assessment: Good tolerance to therapeutic ex and manual therapy  Improved b/l hip mobility noted  Patient would benefit from continued PT  Plan: Progress treatment as tolerated           Manual                     STM to viscera and right psoas 10 min 15 min 15 min 15 min performed by PT  np 15 min b/l 15 min      Hip IR stretching b/l   5 min 5 min np 5 min 5 min                                    Exercise Diary                        Hip flexor stretch at EOT   2 min b/l   2 min  b/l 2 min  b/l 2 min b/l 3 min b/l 3 min b/l 3 min b/l      Standing piriformis stretch 2 min b/l 2 min b/l 2 min b/l 2 min b/l  2 min b/l 2 min b/l 2 min b/l      Prone RF stretch with towel roll under knee  3 min each 3 min each 3 min ea  5 min b/l 5 min b/l 5 min b/l      Prone RF stretch at EOT     2 min each 2 min each 2 min each                                                                                                                                                                                                             Modalities                        MHP   10 min pre tx  10 min pre tx 10 min 10 min 10 min

## 2018-02-15 ENCOUNTER — APPOINTMENT (OUTPATIENT)
Dept: PHYSICAL THERAPY | Facility: CLINIC | Age: 58
End: 2018-02-15
Payer: COMMERCIAL

## 2018-02-19 ENCOUNTER — OFFICE VISIT (OUTPATIENT)
Dept: PHYSICAL THERAPY | Facility: CLINIC | Age: 58
End: 2018-02-19
Payer: COMMERCIAL

## 2018-02-19 DIAGNOSIS — M54.5 ACUTE LOW BACK PAIN, UNSPECIFIED BACK PAIN LATERALITY, WITH SCIATICA PRESENCE UNSPECIFIED: Primary | ICD-10-CM

## 2018-02-19 PROCEDURE — 97014 ELECTRIC STIMULATION THERAPY: CPT

## 2018-02-19 PROCEDURE — 97010 HOT OR COLD PACKS THERAPY: CPT

## 2018-02-19 PROCEDURE — 97140 MANUAL THERAPY 1/> REGIONS: CPT

## 2018-02-19 PROCEDURE — 97110 THERAPEUTIC EXERCISES: CPT

## 2018-02-19 PROCEDURE — G0283 ELEC STIM OTHER THAN WOUND: HCPCS

## 2018-02-19 NOTE — PROGRESS NOTES
Daily Note     Today's date: 2018  Patient name: Jesika Timmons  : 1960  MRN: [id number]  Referring provider: Marisol Mariscal DO  Dx:   Encounter Diagnosis   Name Primary?  Acute low back pain, unspecified back pain laterality, with sciatica presence unspecified Yes                  Subjective: Pt reports increase LBP which he relates to using  yesterday  Pt c/o pain/difficulty with sit to stand  Objective: See treatment diary below      Assessment:  Good tolerance to TE and manual therapy as noted without increase pain  Transfers antalgic  Added H-wave for pain relief  Decrease pain and improved mobility noted post treatment  Plan: Progress treatment as tolerated           Manual                    STM to viscera and right psoas 10 min 15 min 15 min 15 min performed by PT  np 15 min b/l 15 min np     Hip IR stretching b/l   5 min 5 min np 5 min 5 min 10 min                                   Exercise Diary                       Hip flexor stretch at EOT   2 min b/l   2 min  b/l 2 min  b/l 2 min b/l 3 min b/l 3 min b/l 3 min b/l 3 min b/l     Standing piriformis stretch 2 min b/l 2 min b/l 2 min b/l 2 min b/l  2 min b/l 2 min b/l 2 min b/l 2 min b/l     Prone RF stretch with towel roll under knee  3 min each 3 min each 3 min ea  5 min b/l 5 min b/l 5 min b/l 5 min b/l     Prone RF stretch at EOT     2 min each 2 min each 2 min each np                                                                                                                                                                                                            Modalities                       MHP   10 min pre tx  10 min pre tx 10 min 10 min 10 min 10 min pre tx     H-wave         Low setting x15 min post ex

## 2018-02-20 ENCOUNTER — APPOINTMENT (OUTPATIENT)
Dept: PHYSICAL THERAPY | Facility: CLINIC | Age: 58
End: 2018-02-20
Payer: COMMERCIAL

## 2018-02-20 ENCOUNTER — OFFICE VISIT (OUTPATIENT)
Dept: FAMILY MEDICINE CLINIC | Facility: CLINIC | Age: 58
End: 2018-02-20
Payer: COMMERCIAL

## 2018-02-20 VITALS
DIASTOLIC BLOOD PRESSURE: 80 MMHG | WEIGHT: 231.8 LBS | SYSTOLIC BLOOD PRESSURE: 100 MMHG | HEIGHT: 70 IN | BODY MASS INDEX: 33.18 KG/M2

## 2018-02-20 DIAGNOSIS — G89.29 CHRONIC BILATERAL LOW BACK PAIN WITHOUT SCIATICA: Primary | ICD-10-CM

## 2018-02-20 DIAGNOSIS — M54.50 CHRONIC BILATERAL LOW BACK PAIN WITHOUT SCIATICA: Primary | ICD-10-CM

## 2018-02-20 PROCEDURE — 99213 OFFICE O/P EST LOW 20 MIN: CPT | Performed by: FAMILY MEDICINE

## 2018-02-20 RX ORDER — MELOXICAM 15 MG/1
1 TABLET ORAL
COMMUNITY
Start: 2018-01-23 | End: 2018-06-11

## 2018-02-20 RX ORDER — NICOTINE POLACRILEX 4 MG/1
20 GUM, CHEWING ORAL
COMMUNITY
End: 2018-06-11

## 2018-02-20 RX ORDER — OXYCODONE HYDROCHLORIDE AND ACETAMINOPHEN 5; 325 MG/1; MG/1
1 TABLET ORAL
Qty: 15 TABLET | Refills: 0 | Status: SHIPPED | OUTPATIENT
Start: 2018-02-20 | End: 2018-06-11

## 2018-02-20 RX ORDER — PANTOPRAZOLE SODIUM 40 MG/1
1 TABLET, DELAYED RELEASE ORAL 2 TIMES DAILY
COMMUNITY
Start: 2017-06-16 | End: 2019-05-14 | Stop reason: SDUPTHER

## 2018-02-20 RX ORDER — RABEPRAZOLE SODIUM 20 MG/1
1 TABLET, DELAYED RELEASE ORAL DAILY
COMMUNITY
Start: 2013-12-04 | End: 2018-06-11

## 2018-02-20 RX ORDER — ALLOPURINOL 300 MG/1
1 TABLET ORAL EVERY OTHER DAY
COMMUNITY
Start: 2011-10-20 | End: 2018-06-11 | Stop reason: SDUPTHER

## 2018-02-20 RX ORDER — CYCLOBENZAPRINE HCL 10 MG
1 TABLET ORAL
COMMUNITY
Start: 2018-01-23 | End: 2019-05-14 | Stop reason: ALTCHOICE

## 2018-02-20 RX ORDER — MELATONIN
DAILY
COMMUNITY

## 2018-02-20 RX ORDER — METHYLPREDNISOLONE 4 MG/1
TABLET ORAL
Qty: 21 TABLET | Refills: 0 | Status: SHIPPED | OUTPATIENT
Start: 2018-02-20 | End: 2018-06-11

## 2018-02-20 NOTE — PROGRESS NOTES
Assessment/Plan:    No problem-specific Assessment & Plan notes found for this encounter  note for patient return to work on Thursday  Patient will see if he can go to work tomorrow  Diagnoses and all orders for this visit:    Chronic bilateral low back pain without sciatica    Other orders  -     Aspirin 81 MG EC tablet; Take by mouth  -     allopurinol (ZYLOPRIM) 300 mg tablet; Take 1 tablet by mouth every other day  -     Cholecalciferol 1000 UNIT/10ML LIQD; Take 10,000 Units by mouth  -     Multiple Vitamins-Minerals (MENS MULTIVITAMIN PLUS) TABS; Take 1 tablet by mouth daily  -     Omeprazole 20 MG TBEC; Take 20 mg by mouth  -     pantoprazole (PROTONIX) 40 mg tablet; Take 1 tablet by mouth Twice daily  -     RABEprazole (ACIPHEX) 20 MG tablet; Take 1 tablet by mouth daily  -     Cholecalciferol (VITAMIN D3) 2000 units capsule; Take by mouth  -     cyclobenzaprine (FLEXERIL) 10 mg tablet; Take 1 tablet by mouth  -     meloxicam (MOBIC) 15 mg tablet; Take 1 tablet by mouth          Subjective:      Patient ID: Kee Posada is a 65 y o  male  Patient is here with lumbar pain along with spasm  Patient without any problems with urination or defecation or radicular symptoms  No fever or rash noted  Patient status post lifting butter water walking about 2 steps then going down on his knees  No other falls  Patient was getting physical therapy 1st hips  Patient was getting 10s unit  Back Pain         The following portions of the patient's history were reviewed and updated as appropriate: allergies, current medications, past family history, past medical history, past social history, past surgical history and problem list     Review of Systems   Constitutional: Negative  HENT: Negative  Eyes: Negative  Respiratory: Negative  Cardiovascular: Negative  Gastrointestinal: Negative  Endocrine: Negative  Genitourinary: Negative      Musculoskeletal: Positive for arthralgias and back pain    Skin: Negative  Allergic/Immunologic: Negative  Neurological: Negative  Hematological: Negative  Psychiatric/Behavioral: Negative  Objective:      /80 (BP Location: Left arm, Patient Position: Sitting, Cuff Size: Standard)   Ht 5' 10" (1 778 m)   Wt 105 kg (231 lb 12 8 oz)   BMI 33 26 kg/m²          Physical Exam   Cardiovascular: Normal rate, regular rhythm and normal heart sounds  Pulmonary/Chest: Effort normal and breath sounds normal    Musculoskeletal:   No pain lumbar region with palpation  Patient has some paravertebral muscle spasm  Patient with negative straight leg raise bilaterally  Gross sensation lower extremities intact to light touch  Nursing note and vitals reviewed

## 2018-02-21 ENCOUNTER — TELEPHONE (OUTPATIENT)
Dept: FAMILY MEDICINE CLINIC | Facility: CLINIC | Age: 58
End: 2018-02-21

## 2018-02-22 ENCOUNTER — OFFICE VISIT (OUTPATIENT)
Dept: PHYSICAL THERAPY | Facility: CLINIC | Age: 58
End: 2018-02-22
Payer: COMMERCIAL

## 2018-02-22 ENCOUNTER — TRANSCRIBE ORDERS (OUTPATIENT)
Dept: ADMINISTRATIVE | Facility: HOSPITAL | Age: 58
End: 2018-02-22

## 2018-02-22 ENCOUNTER — APPOINTMENT (OUTPATIENT)
Dept: RADIOLOGY | Facility: MEDICAL CENTER | Age: 58
End: 2018-02-22
Payer: COMMERCIAL

## 2018-02-22 DIAGNOSIS — G89.29 CHRONIC BILATERAL LOW BACK PAIN WITHOUT SCIATICA: ICD-10-CM

## 2018-02-22 DIAGNOSIS — M54.50 CHRONIC BILATERAL LOW BACK PAIN WITHOUT SCIATICA: ICD-10-CM

## 2018-02-22 DIAGNOSIS — M54.5 ACUTE LOW BACK PAIN, UNSPECIFIED BACK PAIN LATERALITY, WITH SCIATICA PRESENCE UNSPECIFIED: Primary | ICD-10-CM

## 2018-02-22 PROCEDURE — 97010 HOT OR COLD PACKS THERAPY: CPT

## 2018-02-22 PROCEDURE — 72110 X-RAY EXAM L-2 SPINE 4/>VWS: CPT

## 2018-02-22 PROCEDURE — G0283 ELEC STIM OTHER THAN WOUND: HCPCS

## 2018-02-22 PROCEDURE — 97110 THERAPEUTIC EXERCISES: CPT

## 2018-02-22 PROCEDURE — 97140 MANUAL THERAPY 1/> REGIONS: CPT

## 2018-02-22 PROCEDURE — 97014 ELECTRIC STIMULATION THERAPY: CPT

## 2018-02-22 NOTE — PROGRESS NOTES
Daily Note     Today's date: 2018  Patient name: Amelia Clemons  : 1960  MRN: [id number]  Referring provider: Kalpana Olivares DO  Dx:   Encounter Diagnosis   Name Primary?  Acute low back pain, unspecified back pain laterality, with sciatica presence unspecified Yes                  Subjective: Pt reports feeling good after last treatment until picking up a bucket of water  Pt states pain was severe with difficulty bending and getting up from sitting; pt saw MD 18 and received prescription for pain meds and steroids; pt able to resume therapy and will be going for x-rays today  Pt reports returning to work today with pain level 4/10  Objective: See treatment diary below      Assessment:  Good tolerance to TE and manual therapy as noted without increase pain  Transfers and mobility improved  Plan: Progress treatment as tolerated           Manual                   STM to viscera and right psoas 10 min 15 min 15 min 15 min performed by PT  np 15 min b/l 15 min np np    Hip IR stretching b/l   5 min 5 min np 5 min 5 min 10 min 10 min                                  Exercise Diary                      Hip flexor stretch at EOT   2 min b/l   2 min  b/l 2 min  b/l 2 min b/l 3 min b/l 3 min b/l 3 min b/l 3 min b/l 3 min b/l    Standing piriformis stretch 2 min b/l 2 min b/l 2 min b/l 2 min b/l  2 min b/l 2 min b/l 2 min b/l 2 min b/l 2 min b/l    Prone RF stretch with towel roll under knee  3 min each 3 min each 3 min ea  5 min b/l 5 min b/l 5 min b/l 3 min b/l 3 min b/l    Prone RF stretch at EOT     2 min each 2 min each 2 min each np np                                                                                                                                                                                                           Modalities                      MHP   10 min pre tx  10 min pre tx 10 min 10 min 10 min 10 min pre tx MH with H-wave15 min pre tx prone over pillows    H-wave         Low setting x15 min post ex With MH pre tx 15 min

## 2018-02-26 ENCOUNTER — OFFICE VISIT (OUTPATIENT)
Dept: PHYSICAL THERAPY | Facility: CLINIC | Age: 58
End: 2018-02-26
Payer: COMMERCIAL

## 2018-02-26 DIAGNOSIS — M54.5 ACUTE LOW BACK PAIN, UNSPECIFIED BACK PAIN LATERALITY, WITH SCIATICA PRESENCE UNSPECIFIED: Primary | ICD-10-CM

## 2018-02-26 DIAGNOSIS — G89.29 CHRONIC BILATERAL LOW BACK PAIN WITHOUT SCIATICA: ICD-10-CM

## 2018-02-26 DIAGNOSIS — M54.50 CHRONIC BILATERAL LOW BACK PAIN WITHOUT SCIATICA: ICD-10-CM

## 2018-02-26 PROCEDURE — 97110 THERAPEUTIC EXERCISES: CPT

## 2018-02-26 PROCEDURE — G0283 ELEC STIM OTHER THAN WOUND: HCPCS

## 2018-02-26 PROCEDURE — 97014 ELECTRIC STIMULATION THERAPY: CPT

## 2018-02-26 PROCEDURE — 97140 MANUAL THERAPY 1/> REGIONS: CPT

## 2018-02-26 NOTE — PROGRESS NOTES
Daily Note     Today's date: 2018  Patient name: Joe Bond  : 1960  MRN: [id number]  Referring provider: Jose Alejandro Taveras DO  Dx:   Encounter Diagnosis   Name Primary?  Acute low back pain, unspecified back pain laterality, with sciatica presence unspecified Yes                  Subjective: Pt reports having x-rays last week and awaiting results  Pt reports low back stiffness only today  Objective: See treatment diary below  Assessment:   Added core stabilization/strengthening ex as noted with good tolerance  Issued pt HEP  Plan: Progress treatment as tolerated           Manual                           STM to viscera and right psoas np             Hip IR stretching b/l 10 min                                          Exercise Diary                           Hip flexor stretch at EOT   2 min b/l              Standing piriformis stretch 2 min b/l            Prone RF stretch with towel roll under knee 2 min b/l            Prone RF stretch at EOT np            PPT  10"x 10            Supine iso hip flexion  10"x 10 ea            Bridges  10"x 10            Clamshell ex 10"x 10 b/l                                                                                                                                                                Modalities                           MHP with H-wave (low setting) pre tx prone over pillow 15 min

## 2018-02-27 ENCOUNTER — TELEPHONE (OUTPATIENT)
Dept: FAMILY MEDICINE CLINIC | Facility: CLINIC | Age: 58
End: 2018-02-27

## 2018-02-27 ENCOUNTER — OFFICE VISIT (OUTPATIENT)
Dept: PHYSICAL THERAPY | Facility: CLINIC | Age: 58
End: 2018-02-27
Payer: COMMERCIAL

## 2018-02-27 DIAGNOSIS — G89.29 CHRONIC BILATERAL LOW BACK PAIN WITHOUT SCIATICA: ICD-10-CM

## 2018-02-27 DIAGNOSIS — M54.5 ACUTE LOW BACK PAIN, UNSPECIFIED BACK PAIN LATERALITY, WITH SCIATICA PRESENCE UNSPECIFIED: Primary | ICD-10-CM

## 2018-02-27 DIAGNOSIS — M54.41 ACUTE LOW BACK PAIN WITH BILATERAL SCIATICA, UNSPECIFIED BACK PAIN LATERALITY: Primary | ICD-10-CM

## 2018-02-27 DIAGNOSIS — M54.50 CHRONIC BILATERAL LOW BACK PAIN WITHOUT SCIATICA: ICD-10-CM

## 2018-02-27 DIAGNOSIS — M54.42 ACUTE LOW BACK PAIN WITH BILATERAL SCIATICA, UNSPECIFIED BACK PAIN LATERALITY: Primary | ICD-10-CM

## 2018-02-27 PROCEDURE — 97110 THERAPEUTIC EXERCISES: CPT

## 2018-02-27 PROCEDURE — G0283 ELEC STIM OTHER THAN WOUND: HCPCS

## 2018-02-27 PROCEDURE — 97140 MANUAL THERAPY 1/> REGIONS: CPT

## 2018-02-27 PROCEDURE — 97014 ELECTRIC STIMULATION THERAPY: CPT

## 2018-02-27 NOTE — PROGRESS NOTES
Daily Note     Today's date: 2018  Patient name: Stephanie Tanner  : 1960  MRN: [id number]  Referring provider: Autumn Rios DO  Dx:   Encounter Diagnoses   Name Primary?  Acute low back pain, unspecified back pain laterality, with sciatica presence unspecified Yes    Chronic bilateral low back pain without sciatica                   Subjective:  Pt cont's to report low back stiffness only  Pt reports performing home ex without difficulty  Objective: See treatment diary below  Assessment:  Good tolerance to TE and manual therapy  Minimal verbal/tactile cues required with core stabilization/strengthening ex for proper technique  Muscle fatigue reported with left clamshell ex  Plan: Progress treatment as tolerated           Manual                          STM to viscera and right psoas np  np           Hip IR stretching b/l 10 min 10 min                                         Exercise Diary                          Hip flexor stretch at EOT   2 min b/l   2 min b/l           Standing piriformis stretch 2 min b/l 2 min b/l           Prone RF stretch with towel roll under knee 2 min b/l 2 min b/l           Prone RF stretch at EOT np np           PPT  10"x 10 10"x 10           Supine iso hip flexion  10"x 10 ea 10"x 10 ea           Bridges  10"x 10 10"x 10           Clamshell ex 10"x 10 b/l  10"x 10 b/l                                                                                                                                                              Modalities                          MHP with H-wave (low setting) pre tx prone over pillow 15 min 15 min

## 2018-02-27 NOTE — TELEPHONE ENCOUNTER
Patient called requesting xray results he states his back is still hurting him and requests the next step

## 2018-03-01 ENCOUNTER — APPOINTMENT (OUTPATIENT)
Dept: PHYSICAL THERAPY | Facility: CLINIC | Age: 58
End: 2018-03-01
Payer: COMMERCIAL

## 2018-03-01 ENCOUNTER — OFFICE VISIT (OUTPATIENT)
Dept: FAMILY MEDICINE CLINIC | Facility: CLINIC | Age: 58
End: 2018-03-01
Payer: COMMERCIAL

## 2018-03-01 VITALS
HEIGHT: 70 IN | DIASTOLIC BLOOD PRESSURE: 60 MMHG | SYSTOLIC BLOOD PRESSURE: 100 MMHG | TEMPERATURE: 99.1 F | WEIGHT: 205 LBS | BODY MASS INDEX: 29.35 KG/M2

## 2018-03-01 DIAGNOSIS — J06.9 URTI (ACUTE UPPER RESPIRATORY INFECTION): Primary | ICD-10-CM

## 2018-03-01 PROCEDURE — 99213 OFFICE O/P EST LOW 20 MIN: CPT | Performed by: FAMILY MEDICINE

## 2018-03-01 RX ORDER — GUAIFENESIN AND CODEINE PHOSPHATE 100; 10 MG/5ML; MG/5ML
5 SOLUTION ORAL 3 TIMES DAILY PRN
Qty: 120 ML | Refills: 0 | Status: SHIPPED | OUTPATIENT
Start: 2018-03-01 | End: 2018-06-11

## 2018-03-01 RX ORDER — AZITHROMYCIN 250 MG/1
TABLET, FILM COATED ORAL
Qty: 6 TABLET | Refills: 0 | Status: SHIPPED | OUTPATIENT
Start: 2018-03-01 | End: 2018-03-06

## 2018-03-01 NOTE — PROGRESS NOTES
Assessment/Plan:  Patient Instructions     I recommend supportive care, fluids and rest   Follow-up if not better in 5 days  There are no diagnoses linked to this encounter  Subjective:      Patient ID: Ravi Bone is a 65 y o  male  Patient presents with:  Cough: causing sleepless nights  Symptoms started Tuesday  Laryngitis  Fatigue  Nasal Congestion  Chills            The following portions of the patient's history were reviewed and updated as appropriate: allergies, current medications, past family history, past medical history, past social history, past surgical history and problem list     Review of Systems   Constitutional: Negative  HENT: Positive for sinus pain and sore throat  Eyes: Negative  Respiratory: Positive for cough and shortness of breath  Cardiovascular: Negative  Gastrointestinal: Negative  Endocrine: Negative  Genitourinary: Negative  Musculoskeletal: Negative  Skin: Negative  Allergic/Immunologic: Negative  Neurological: Negative  Hematological: Negative  Psychiatric/Behavioral: Negative  Objective:      /60 (BP Location: Right arm, Patient Position: Sitting, Cuff Size: Standard)   Temp 99 1 °F (37 3 °C)   Ht 5' 10" (1 778 m)   Wt 93 kg (205 lb)   BMI 29 41 kg/m²          Physical Exam   Constitutional: He appears well-developed  HENT:   Throat is red with adenopathy postnasal drip  Ears have fluid bilaterally no erythema  Eyes: EOM are normal  Pupils are equal, round, and reactive to light  Neck: Normal range of motion  Cardiovascular: Normal rate and regular rhythm  Pulmonary/Chest: Effort normal and breath sounds normal  No respiratory distress  Abdominal: Soft  Musculoskeletal: Normal range of motion  Neurological: He is alert  Skin: Skin is warm and dry  Nursing note and vitals reviewed

## 2018-03-01 NOTE — PATIENT INSTRUCTIONS

## 2018-03-02 ENCOUNTER — OFFICE VISIT (OUTPATIENT)
Dept: FAMILY MEDICINE CLINIC | Facility: CLINIC | Age: 58
End: 2018-03-02
Payer: COMMERCIAL

## 2018-03-02 ENCOUNTER — TELEPHONE (OUTPATIENT)
Dept: FAMILY MEDICINE CLINIC | Facility: CLINIC | Age: 58
End: 2018-03-02

## 2018-03-02 VITALS
BODY MASS INDEX: 40.05 KG/M2 | HEIGHT: 60 IN | DIASTOLIC BLOOD PRESSURE: 60 MMHG | TEMPERATURE: 100.5 F | WEIGHT: 204 LBS | SYSTOLIC BLOOD PRESSURE: 100 MMHG

## 2018-03-02 DIAGNOSIS — J11.1 INFLUENZA: Primary | ICD-10-CM

## 2018-03-02 PROCEDURE — 99213 OFFICE O/P EST LOW 20 MIN: CPT | Performed by: FAMILY MEDICINE

## 2018-03-02 RX ORDER — OSELTAMIVIR PHOSPHATE 75 MG/1
75 CAPSULE ORAL EVERY 12 HOURS SCHEDULED
Qty: 10 CAPSULE | Refills: 0
Start: 2018-03-02 | End: 2018-03-07

## 2018-03-02 RX ORDER — BROMPHENIRAMINE MALEATE, PSEUDOEPHEDRINE HYDROCHLORIDE, AND DEXTROMETHORPHAN HYDROBROMIDE 2; 30; 10 MG/5ML; MG/5ML; MG/5ML
5 SYRUP ORAL 4 TIMES DAILY PRN
Qty: 120 ML | Refills: 0
Start: 2018-03-02 | End: 2018-06-11

## 2018-03-02 NOTE — PROGRESS NOTES
Assessment/Plan:    No problem-specific Assessment & Plan notes found for this encounter  Diagnoses and all orders for this visit:    Influenza  -     oseltamivir (TAMIFLU) 75 mg capsule; Take 1 capsule (75 mg total) by mouth every 12 (twelve) hours for 5 days  -     brompheniramine-pseudoephedrine-DM 30-2-10 MG/5ML syrup; Take 5 mL by mouth 4 (four) times a day as needed for cough or allergies          Subjective:      Patient ID: Akanksha Seymour is a 65 y o  male  Patient is here with myalgias, headache, cough with sputum production along with rhinorrhea and fever over the past day  No vomiting or diarrhea  Patient fatigue  Patient on Z-Ronan x1 day  Patient using quite test with codeine without improvement  Patient with significant cough when lying down  The following portions of the patient's history were reviewed and updated as appropriate: allergies, current medications, past family history, past medical history, past social history, past surgical history and problem list     Review of Systems   Constitutional: Positive for fatigue and fever  HENT: Positive for postnasal drip, rhinorrhea and sore throat  Eyes: Negative  Respiratory: Positive for cough  Cardiovascular: Negative  Gastrointestinal: Negative  Endocrine: Negative  Genitourinary: Negative  Musculoskeletal: Positive for arthralgias  Skin: Negative  Allergic/Immunologic: Negative  Neurological: Positive for headaches  Hematological: Negative  Psychiatric/Behavioral: Negative  Objective:      /60 (BP Location: Left arm, Patient Position: Sitting, Cuff Size: Standard)   Temp 100 5 °F (38 1 °C)   Ht 1' 9 34" (0 542 m)   Wt 92 5 kg (204 lb)    07 kg/m²          Physical Exam   Constitutional: He is oriented to person, place, and time  He appears well-developed and well-nourished  No distress  HENT:   Head: Normocephalic     Right Ear: External ear normal    Left Ear: External ear normal    Mouth/Throat: Oropharyngeal exudate present  Eyes: EOM are normal  Pupils are equal, round, and reactive to light  Right eye exhibits no discharge  Left eye exhibits no discharge  No scleral icterus  Neck: Normal range of motion  Neck supple  No thyromegaly present  Cardiovascular: Normal rate, regular rhythm, normal heart sounds and intact distal pulses  Exam reveals no gallop and no friction rub  No murmur heard  Pulmonary/Chest: Effort normal and breath sounds normal  No respiratory distress  He has no wheezes  He has no rales  He exhibits no tenderness  Abdominal: Soft  Bowel sounds are normal  He exhibits no distension  There is no tenderness  There is no rebound and no guarding  Musculoskeletal: Normal range of motion  He exhibits no edema or tenderness  Lymphadenopathy:     He has no cervical adenopathy  Neurological: He is oriented to person, place, and time  No cranial nerve deficit  He exhibits normal muscle tone  Coordination normal    Skin: Skin is warm and dry  No rash noted  He is not diaphoretic  No erythema  No pallor  Psychiatric: He has a normal mood and affect  His behavior is normal  Judgment and thought content normal    Nursing note and vitals reviewed

## 2018-03-02 NOTE — TELEPHONE ENCOUNTER
Pt called stating the cough syrup with codeine for his [facility] is not helping at all and he took double does  And he cant sleep his back and chest is sore   Please advice

## 2018-03-06 ENCOUNTER — OFFICE VISIT (OUTPATIENT)
Dept: PHYSICAL THERAPY | Facility: CLINIC | Age: 58
End: 2018-03-06
Payer: COMMERCIAL

## 2018-03-06 DIAGNOSIS — M54.5 ACUTE LOW BACK PAIN, UNSPECIFIED BACK PAIN LATERALITY, WITH SCIATICA PRESENCE UNSPECIFIED: Primary | ICD-10-CM

## 2018-03-06 DIAGNOSIS — M54.50 CHRONIC BILATERAL LOW BACK PAIN WITHOUT SCIATICA: ICD-10-CM

## 2018-03-06 DIAGNOSIS — G89.29 CHRONIC BILATERAL LOW BACK PAIN WITHOUT SCIATICA: ICD-10-CM

## 2018-03-06 PROCEDURE — 97110 THERAPEUTIC EXERCISES: CPT

## 2018-03-06 NOTE — PROGRESS NOTES
Daily Note     Today's date: 3/6/2018  Patient name: Archie Salcedo  : 1960  MRN: [id number]  Referring provider: Owen Maldonado DO  Dx:   Encounter Diagnoses   Name Primary?  Acute low back pain, unspecified back pain laterality, with sciatica presence unspecified Yes    Chronic bilateral low back pain without sciatica                   Subjective:  Pt cont's to report low back stiffness but denies pain  Pt states he is scheduled for low back MRI 3/9/18  Pt reports not doing home ex the past 4-5 days secondary to illness  Objective: See treatment diary below  Assessment:  Held modalities today secondary to decrease symptoms  Good tolerance to progression of TE as noted  Minimal verbal/tactile cues required with core stabilization/strengthening ex for proper technique  Muscle fatigue reported with left clamshell ex  Plan: Progress treatment as tolerated           Manual   3                       STM to viscera and right psoas np  np np          Hip IR stretching b/l 10 min 10 min np                                        Exercise Diary   3/6                       Hip flexor stretch at EOT   2 min b/l   2 min b/l 2 min b/l           Standing piriformis stretch 2 min b/l 2 min b/l 2 min b/l           Prone RF stretch with towel roll under knee 2 min b/l 2 min b/l 2 min b/l          Prone RF stretch at EOT np np np          PPT  10"x 10 10"x 10 10"x 10          Supine iso hip flexion  10"x 10 ea 10"x 10 ea 10"x 10 ea          Bridges  10"x 10 10"x 10 10"x 10          Clamshell ex 10"x 10 b/l  10"x 10 b/l 10"x 10 b/l          Bike    10 min                                                                                                                                                Modalities   3/6                       MHP with H-wave (low setting) pre tx prone over pillow 15 min 15 min  np

## 2018-03-07 NOTE — PROGRESS NOTES
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)   4  Chest pain (786 50) (R07 9)   5  Esophageal reflux (530 81) (K21 9)   6  Esophagitis, reflux (530 11) (K21 0)   7  Gout (274 9) (M10 9)   8  Low vitamin D level (268 9) (E55 9)   9  Need for revaccination (V05 9) (Z23)   10  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

## 2018-03-08 ENCOUNTER — OFFICE VISIT (OUTPATIENT)
Dept: PHYSICAL THERAPY | Facility: CLINIC | Age: 58
End: 2018-03-08
Payer: COMMERCIAL

## 2018-03-08 ENCOUNTER — TELEPHONE (OUTPATIENT)
Dept: FAMILY MEDICINE CLINIC | Facility: CLINIC | Age: 58
End: 2018-03-08

## 2018-03-08 DIAGNOSIS — G89.29 CHRONIC BILATERAL LOW BACK PAIN WITHOUT SCIATICA: ICD-10-CM

## 2018-03-08 DIAGNOSIS — M54.5 ACUTE LOW BACK PAIN, UNSPECIFIED BACK PAIN LATERALITY, WITH SCIATICA PRESENCE UNSPECIFIED: Primary | ICD-10-CM

## 2018-03-08 DIAGNOSIS — M54.50 CHRONIC BILATERAL LOW BACK PAIN WITHOUT SCIATICA: ICD-10-CM

## 2018-03-08 DIAGNOSIS — M54.9 BACK PAIN, UNSPECIFIED BACK LOCATION, UNSPECIFIED BACK PAIN LATERALITY, UNSPECIFIED CHRONICITY: Primary | ICD-10-CM

## 2018-03-08 PROCEDURE — 97110 THERAPEUTIC EXERCISES: CPT

## 2018-03-08 NOTE — TELEPHONE ENCOUNTER
BO Dickinson had contacted our office regarding the MRI L-Spine that is scheduled for 3/9/18  After checking the status via [facility]IL[facility] it is determined the test has been denied  I cannot specify why at the moment as the notification from the insurance company has not made it into his chart yet  The patient has been notified and the test has been cancelled  Pt noted he is still going through physical therapy so his pain seems to be stable at the moment  How would you like to proceed?

## 2018-03-08 NOTE — PROGRESS NOTES
Daily Note     Today's date: 3/8/2018  Patient name: Rita Cohen  : 1960  MRN: [id number]  Referring provider: Rigo Pelletier DO  Dx:   Encounter Diagnoses   Name Primary?  Acute low back pain, unspecified back pain laterality, with sciatica presence unspecified Yes    Chronic bilateral low back pain without sciatica                   Subjective:  Pt cont's to report feeling good with no c/o pain  Objective: See treatment diary below  Assessment:  Progressed core stabilization/strengthening ex as noted with good tolerance  Pt cont's to report muscle fatigue with left clamshell ex  Plan: Progress treatment as tolerated           Manual  2/26 2/27 3/6 3/8                      STM to viscera and right psoas np  np np np         Hip IR stretching b/l 10 min 10 min np np                                       Exercise Diary  2/26 2/27 3/6 3/8                      Hip flexor stretch at EOT   2 min b/l   2 min b/l 2 min b/l  2 min b/l          Standing piriformis stretch 2 min b/l 2 min b/l 2 min b/l  2 min b/l          Prone RF stretch with towel roll under knee 2 min b/l 2 min b/l 2 min b/l 2 min b/l          Prone RF stretch at EOT np np np np         PPT  10"x 10 10"x 10 10"x 10 10"x 10         Supine iso hip flexion  10"x 10 ea 10"x 10 ea 10"x 10 ea 10"x 10 ea         Bridges  10"x 10 10"x 10 10"x 10 10"x 10         Clamshell ex 10"x 10 b/l  10"x 10 b/l 10"x 10 b/l 10"x 10         Bike    10 min 10 min          Prone hip ext iso    10"x 5 ea                                                                                                                                   Modalities  2/26 2/27 3/6 3/8                      MHP with H-wave (low setting) pre tx prone over pillow 15 min 15 min  np np

## 2018-03-09 ENCOUNTER — TELEPHONE (OUTPATIENT)
Dept: FAMILY MEDICINE CLINIC | Facility: CLINIC | Age: 58
End: 2018-03-09

## 2018-03-09 NOTE — TELEPHONE ENCOUNTER
Patient called because you referred him to ortho for back pain and the only ortho that schedules for back pain is scheduling out into July of 2018  Patient does not want to wait that long to be seen  Patient wants to know if you will refer him to [facility], [facility] or another ortho?

## 2018-03-12 ENCOUNTER — OFFICE VISIT (OUTPATIENT)
Dept: PHYSICAL THERAPY | Facility: CLINIC | Age: 58
End: 2018-03-12
Payer: COMMERCIAL

## 2018-03-12 DIAGNOSIS — M54.5 ACUTE LOW BACK PAIN, UNSPECIFIED BACK PAIN LATERALITY, WITH SCIATICA PRESENCE UNSPECIFIED: Primary | ICD-10-CM

## 2018-03-12 DIAGNOSIS — G89.29 CHRONIC BILATERAL LOW BACK PAIN WITHOUT SCIATICA: ICD-10-CM

## 2018-03-12 DIAGNOSIS — M54.50 CHRONIC BILATERAL LOW BACK PAIN WITHOUT SCIATICA: ICD-10-CM

## 2018-03-12 DIAGNOSIS — M54.9 BACK PAIN, UNSPECIFIED BACK LOCATION, UNSPECIFIED BACK PAIN LATERALITY, UNSPECIFIED CHRONICITY: Primary | ICD-10-CM

## 2018-03-12 PROCEDURE — 97110 THERAPEUTIC EXERCISES: CPT

## 2018-03-12 NOTE — PROGRESS NOTES
Daily Note     Today's date: 3/12/2018  Patient name: Ravi Bone  : 1960  MRN: [id number]  Referring provider: Anson Gonzalez DO  Dx:   Encounter Diagnoses   Name Primary?  Acute low back pain, unspecified back pain laterality, with sciatica presence unspecified Yes    Chronic bilateral low back pain without sciatica                   Subjective:  Pt cont's to report feeling good with no c/o pain  Objective: See treatment diary below  Assessment:  Good tolerance to progression of TE as noted  Plan: Progress treatment as tolerated  Pt has appt with orthopaedic Dr on 3/15/18          Manual  2/26 2/27 3/6 3/8 3/12                     STM to viscera and right psoas np  np np np np        Hip IR stretching b/l 10 min 10 min np np np                                      Exercise Diary  2/26 2/27 3/6 3/8 3/12                     Hip flexor stretch at EOT   2 min b/l   2 min b/l 2 min b/l  2 min b/l  2 min b/l        Standing piriformis stretch 2 min b/l 2 min b/l 2 min b/l  2 min b/l  2 min b/l        Prone RF stretch with towel roll under knee 2 min b/l 2 min b/l 2 min b/l 2 min b/l  2 min b/l        Prone RF stretch at EOT np np np np np        PPT  10"x 10 10"x 10 10"x 10 10"x 10 10"x 10        Supine iso hip flexion  10"x 10 ea 10"x 10 ea 10"x 10 ea 10"x 10 ea 10"x 10        Bridges  10"x 10 10"x 10 10"x 10 10"x 10 10"x 10        Clamshell ex 10"x 10 b/l  10"x 10 b/l 10"x 10 b/l 10"x 10 Red tband 10"x 10        Bike    10 min 10 min  10 min         Prone hip ext iso    10"x 5 ea  10"x 10 ea                                                                                                                                 Modalities  2/26 2/27 3/6 3/8 3/12                     MHP with H-wave (low setting) pre tx prone over pillow 15 min 15 min  np np np

## 2018-03-15 ENCOUNTER — APPOINTMENT (OUTPATIENT)
Dept: PHYSICAL THERAPY | Facility: CLINIC | Age: 58
End: 2018-03-15
Payer: COMMERCIAL

## 2018-03-20 ENCOUNTER — OFFICE VISIT (OUTPATIENT)
Dept: PHYSICAL THERAPY | Facility: CLINIC | Age: 58
End: 2018-03-20
Payer: COMMERCIAL

## 2018-03-20 DIAGNOSIS — M54.50 CHRONIC BILATERAL LOW BACK PAIN WITHOUT SCIATICA: Primary | ICD-10-CM

## 2018-03-20 DIAGNOSIS — G89.29 CHRONIC BILATERAL LOW BACK PAIN WITHOUT SCIATICA: Primary | ICD-10-CM

## 2018-03-20 DIAGNOSIS — M54.5 ACUTE LOW BACK PAIN, UNSPECIFIED BACK PAIN LATERALITY, WITH SCIATICA PRESENCE UNSPECIFIED: ICD-10-CM

## 2018-03-20 PROCEDURE — 97110 THERAPEUTIC EXERCISES: CPT

## 2018-03-20 NOTE — PROGRESS NOTES
Daily Note     Today's date: 3/20/2018  Patient name: Judy Blair  : 1960  MRN: [id number]  Referring provider: Mike Villarreal DO  Dx:   Encounter Diagnoses   Name Primary?  Acute low back pain, unspecified back pain laterality, with sciatica presence unspecified     Chronic bilateral low back pain without sciatica Yes                  Subjective:  Pt saw orthopedic Dr 3/15/18 and returns with orders to continue PT focusing on core/back strengthening, stretching, ROM and HEP  Pt cont's to report low back stiffness but denies pain  Pt compliant with HEP  Objective: See treatment diary below  Assessment:  Good tolerance to progression of TE as noted  Minimal verbal/tactile cues required with ex for proper technique  Pt responds well to TE with decrease decrease low back tightness and improved lumbar/hip mobility noted  Plan: Progress treatment as tolerated  Pt has follow up appt with MD 3/29/18         Manual  2/26 2/27 3/6 3/8 3/12 3/20                    STM to viscera and right psoas np  np np np np np       Hip IR stretching b/l 10 min 10 min np np np np                                     Exercise Diary  2/26 2/27 3/6 3/8 3/12 3/20                    Hip flexor stretch at EOT   2 min b/l   2 min b/l 2 min b/l  2 min b/l  2 min b/l 3 min b/l       Standing piriformis stretch 2 min b/l 2 min b/l 2 min b/l  2 min b/l  2 min b/l 2 min b/l       Prone RF stretch with towel roll under knee 2 min b/l 2 min b/l 2 min b/l 2 min b/l  2 min b/l 2 min b/l       Prone RF stretch at EOT np np np np np np       PPT  10"x 10 10"x 10 10"x 10 10"x 10 10"x 10 10"x 10       Supine iso hip flexion  10"x 10 ea 10"x 10 ea 10"x 10 ea 10"x 10 ea 10"x 10 10"x 10       Bridges  10"x 10 10"x 10 10"x 10 10"x 10 10"x 10 10"x 10       Clamshell ex 10"x 10 b/l  10"x 10 b/l 10"x 10 b/l 10"x 10 Red tband 10"x 10 Green10"x 10 b/l       Bike    10 min 10 min  10 min  10 min       Prone hip ext iso    10"x 5 ea  10"x 10 ea 10"x 10                                                                                                                                Modalities  2/26 2/27 3/6 3/8 3/12 3/20                    MHP with H-wave (low setting) pre tx prone over pillow 15 min 15 min  np np np np

## 2018-03-22 ENCOUNTER — OFFICE VISIT (OUTPATIENT)
Dept: PHYSICAL THERAPY | Facility: CLINIC | Age: 58
End: 2018-03-22
Payer: COMMERCIAL

## 2018-03-22 DIAGNOSIS — G89.29 CHRONIC BILATERAL LOW BACK PAIN WITHOUT SCIATICA: ICD-10-CM

## 2018-03-22 DIAGNOSIS — M54.5 ACUTE LOW BACK PAIN, UNSPECIFIED BACK PAIN LATERALITY, WITH SCIATICA PRESENCE UNSPECIFIED: Primary | ICD-10-CM

## 2018-03-22 DIAGNOSIS — M54.50 CHRONIC BILATERAL LOW BACK PAIN WITHOUT SCIATICA: ICD-10-CM

## 2018-03-22 PROCEDURE — 97110 THERAPEUTIC EXERCISES: CPT

## 2018-03-22 NOTE — PROGRESS NOTES
Daily Note     Today's date: 3/22/2018  Patient name: Archie Salcedo  : 1960  MRN: [id number]  Referring provider: Owen Maldonado DO  Dx:   Encounter Diagnoses   Name Primary?  Acute low back pain, unspecified back pain laterality, with sciatica presence unspecified Yes    Chronic bilateral low back pain without sciatica                   Subjective: Patient reported having to shovel, breaking up the task over two days to avoid increase in symptoms, denying any onset of symptoms during or post        Objective: See treatment diary below      Assessment: Progressing steadily with LE strength and flexibility  Plan: Progress treatment as tolerated  Next MD f/u scheduled for 3/29        Manual  2/26 2/27 3/6 3/8 3/12 3/20 3/22                   STM to viscera and R psoas np  np np np np np NP      Hip IR stretching b/l 10 min 10 min np np np np NP                                    Exercise Diary  2/26 2/27 3/6 3/8 3/12 3/20 3/22                   Hip flexor stretch at EOT   2 min b/l   2 min b/l 2 min b/l  2 min b/l  2 min b/l 3 min b/l 3 min b/l      Standing piriformis stretch 2 min b/l 2 min b/l 2 min b/l  2 min b/l  2 min b/l 2 min b/l 2 min b/l      Prone RF stretch with towel roll under knee 2 min b/l 2 min b/l 2 min b/l 2 min b/l  2 min b/l 2 min b/l 2 min b/l      Prone RF stretch at EOT np np np np np np NP      PPT  10"x 10 10"x 10 10"x 10 10"x 10 10"x 10 10"x 10 10"x  10      Supine iso hip flexion  10"x 10 ea 10"x 10 ea 10"x 10 ea 10"x 10 ea 10"x 10 10"x 10 10"x 10      Bridges  10"x 10 10"x 10 10"x 10 10"x 10 10"x 10 10"x 10 10"x 10      Clamshell 10"x 10 b/l  10"x 10 b/l 10"x 10 b/l 10"x 10 Red tband 10"x 10 Green10"x 10 b/l Green10"x  10 b/l      Bike    10 min 10 min  10 min  10 min 10 min      Prone hip ext iso    10"x 5 ea  10"x 10 ea 10"x 10 10"x 10                                                                                                                               Modalities 2/26 2/27 3/6 3/8 3/12 3/20 3/22                   MHP with H-wave ( low setting ) in prone over pillow 15 min 15 min  np np np np NP                     10 min not supervised

## 2018-03-26 ENCOUNTER — APPOINTMENT (OUTPATIENT)
Dept: PHYSICAL THERAPY | Facility: CLINIC | Age: 58
End: 2018-03-26
Payer: COMMERCIAL

## 2018-04-04 ENCOUNTER — OFFICE VISIT (OUTPATIENT)
Dept: FAMILY MEDICINE CLINIC | Facility: CLINIC | Age: 58
End: 2018-04-04
Payer: COMMERCIAL

## 2018-04-04 VITALS
DIASTOLIC BLOOD PRESSURE: 68 MMHG | WEIGHT: 210.4 LBS | SYSTOLIC BLOOD PRESSURE: 132 MMHG | HEIGHT: 61 IN | BODY MASS INDEX: 39.73 KG/M2

## 2018-04-04 DIAGNOSIS — H53.9 VISUAL DISTURBANCE: Primary | ICD-10-CM

## 2018-04-04 PROCEDURE — 3008F BODY MASS INDEX DOCD: CPT | Performed by: FAMILY MEDICINE

## 2018-04-04 PROCEDURE — 99213 OFFICE O/P EST LOW 20 MIN: CPT | Performed by: FAMILY MEDICINE

## 2018-04-04 NOTE — PROGRESS NOTES
Assessment/Plan:    Awaiting letter from ophthalmologist regarding findings  Will order blood work accordingly at that time  Guidance given  Follow-up as needed  No problem-specific Assessment & Plan notes found for this encounter  There are no diagnoses linked to this encounter  Subjective:      Patient ID: Gin Doe is a 65 y o  male  Patient is here status post seeing ophthalmologist who had exam recently  Patient recently had blood work done  Patient has some concerns regarding potential for diabetes hypertension hyperlipidemia which was addressed by the ophthalmologist   No chest pain or shortness of breath or change in urination or defecation  The patient does have chronic back pain is seeing oaa  The following portions of the patient's history were reviewed and updated as appropriate: allergies, current medications, past family history, past medical history, past social history, past surgical history and problem list     Review of Systems   Constitutional: Negative  HENT: Negative  Eyes: Positive for visual disturbance  Respiratory: Negative  Cardiovascular: Negative  Gastrointestinal: Negative  Endocrine: Negative  Genitourinary: Negative  Musculoskeletal: Negative  Skin: Negative  Allergic/Immunologic: Negative  Neurological: Negative  Hematological: Negative  Psychiatric/Behavioral: Negative  Objective:      /68 (BP Location: Right arm, Patient Position: Sitting, Cuff Size: Standard)   Ht 5' 0 71" (1 542 m)   Wt 95 4 kg (210 lb 6 4 oz)   BMI 40 14 kg/m²          Physical Exam   Constitutional: He is oriented to person, place, and time  He appears well-developed and well-nourished  No distress  HENT:   Head: Normocephalic  Right Ear: External ear normal    Left Ear: External ear normal    Mouth/Throat: Oropharynx is clear and moist  No oropharyngeal exudate     Eyes: EOM are normal  Pupils are equal, round, and reactive to light  Right eye exhibits no discharge  Left eye exhibits no discharge  No scleral icterus  Neck: Normal range of motion  Neck supple  No thyromegaly present  Cardiovascular: Normal rate, regular rhythm, normal heart sounds and intact distal pulses  Exam reveals no gallop and no friction rub  No murmur heard  Pulmonary/Chest: Effort normal and breath sounds normal  No respiratory distress  He has no wheezes  He has no rales  He exhibits no tenderness  Abdominal: Soft  Bowel sounds are normal  He exhibits no distension  There is no tenderness  There is no rebound and no guarding  Musculoskeletal: Normal range of motion  He exhibits no edema or tenderness  Lymphadenopathy:     He has no cervical adenopathy  Neurological: He is oriented to person, place, and time  No cranial nerve deficit  He exhibits normal muscle tone  Coordination normal    Skin: Skin is warm and dry  No rash noted  He is not diaphoretic  No erythema  No pallor  Psychiatric: He has a normal mood and affect  His behavior is normal  Judgment and thought content normal    Nursing note and vitals reviewed

## 2018-04-06 NOTE — PROGRESS NOTES
Call patient  Letter from ophthalmologist reviewed  Patient with retinal branch occlusion right eye  Recommend lab work to include CMP, A1c, TSH, CBC, lipid profile, J LUIS  Patient will also need carotid duplex ultrasound bilaterally

## 2018-04-11 ENCOUNTER — TELEPHONE (OUTPATIENT)
Dept: FAMILY MEDICINE CLINIC | Facility: CLINIC | Age: 58
End: 2018-04-11

## 2018-04-11 ENCOUNTER — TRANSCRIBE ORDERS (OUTPATIENT)
Dept: PHYSICAL THERAPY | Facility: CLINIC | Age: 58
End: 2018-04-11

## 2018-04-11 DIAGNOSIS — H34.8112 CENTRAL RETINAL VEIN OCCLUSION OF RIGHT EYE, UNSPECIFIED COMPLICATION STATUS: Primary | ICD-10-CM

## 2018-04-11 NOTE — TELEPHONE ENCOUNTER
I addressed this already in a previous task please see that task    Order the blood work that I put on the previous task Follow up with the ophthalmologist   If patient seeing optometrist then set up appointment with ophthalmologist at [facility]

## 2018-04-12 NOTE — TELEPHONE ENCOUNTER
DR MEZA CAN YOU PLEASE RELOOK OVER REPORT , PATIENT STATES THE EYE DR FOUND SOMETHING AFTER SHE PLACED DROPS IN HIS EYE'S THAT CAUSED HIS BACK/NECK PAIN  SHE IS CONCERN AND WOULD LIKE TO KNOW THE NEXT STEPS  UNABLE TO LOCATE PREVIOUS TASK

## 2018-04-16 NOTE — TELEPHONE ENCOUNTER
Call patient  Order laboratory studies to include CMP, CBC, TSH, lipid profile, A1c, J LUIS, CRP    Also order MRA of the brain due to retinal vessel occlusion

## 2018-04-24 DIAGNOSIS — H34.231 RETINAL ARTERY BRANCH OCCLUSION OF RIGHT EYE: Primary | ICD-10-CM

## 2018-04-24 DIAGNOSIS — H34.8112 CENTRAL RETINAL VEIN OCCLUSION OF RIGHT EYE, UNSPECIFIED COMPLICATION STATUS: Primary | ICD-10-CM

## 2018-04-29 ENCOUNTER — HOSPITAL ENCOUNTER (OUTPATIENT)
Dept: MRI IMAGING | Facility: HOSPITAL | Age: 58
Discharge: HOME | End: 2018-04-29
Payer: COMMERCIAL

## 2018-04-29 DIAGNOSIS — H34.231 RETINAL ARTERY BRANCH OCCLUSION OF RIGHT EYE: ICD-10-CM

## 2018-04-29 PROCEDURE — 70544 MR ANGIOGRAPHY HEAD W/O DYE: CPT

## 2018-05-01 ENCOUNTER — TELEPHONE (OUTPATIENT)
Dept: FAMILY MEDICINE CLINIC | Facility: CLINIC | Age: 58
End: 2018-05-01

## 2018-06-11 ENCOUNTER — OFFICE VISIT (OUTPATIENT)
Dept: FAMILY MEDICINE CLINIC | Facility: CLINIC | Age: 58
End: 2018-06-11
Payer: COMMERCIAL

## 2018-06-11 VITALS
BODY MASS INDEX: 29.92 KG/M2 | WEIGHT: 209 LBS | HEIGHT: 70 IN | TEMPERATURE: 99.1 F | SYSTOLIC BLOOD PRESSURE: 120 MMHG | DIASTOLIC BLOOD PRESSURE: 70 MMHG

## 2018-06-11 DIAGNOSIS — M10.00 IDIOPATHIC GOUT, UNSPECIFIED CHRONICITY, UNSPECIFIED SITE: ICD-10-CM

## 2018-06-11 DIAGNOSIS — H53.9 VISUAL DISTURBANCE: Primary | ICD-10-CM

## 2018-06-11 DIAGNOSIS — Z12.11 SCREENING FOR COLON CANCER: ICD-10-CM

## 2018-06-11 PROBLEM — M25.519 SHOULDER JOINT PAIN: Status: ACTIVE | Noted: 2018-06-11

## 2018-06-11 PROCEDURE — 1036F TOBACCO NON-USER: CPT | Performed by: FAMILY MEDICINE

## 2018-06-11 PROCEDURE — 99213 OFFICE O/P EST LOW 20 MIN: CPT | Performed by: FAMILY MEDICINE

## 2018-06-11 RX ORDER — ALLOPURINOL 300 MG/1
300 TABLET ORAL EVERY OTHER DAY
Qty: 90 TABLET | Refills: 0 | Status: SHIPPED | OUTPATIENT
Start: 2018-06-11 | End: 2019-02-09 | Stop reason: SDUPTHER

## 2018-06-11 NOTE — PROGRESS NOTES
Assessment/Plan:    PATIENT WILL FOLLOW UP WITH OPTOMETRIST AND POSSIBLE REFERRAL TO OPHTHALMOLOGIST IF NEEDED  MRI AND LABS DISCUSSED WITH THE PATIENT  REFILL ON MEDICINE FOR GOUT  Diagnoses and all orders for this visit:    Visual disturbance    Screening for colon cancer  -     Ambulatory referral to Gastroenterology; Future  -     Occult Bloood,Fecal Immunochemical; Future    Idiopathic gout, unspecified chronicity, unspecified site          Subjective:      Patient ID: Gin Doe is a 65 y o  male  PATIENT FOLLOW-UP ON RIGHT EYE ABNORMALITY NOTED BY OPTOMETRIST  PATIENT DID HAVE MRI DONE AS WELL AS LABORATORY STUDIES WHICH WERE UNREMARKABLE  PATIENT HAS NOT AS SIGNIFICANT VISUAL DISTURBANCE  The following portions of the patient's history were reviewed and updated as appropriate: allergies, current medications, past family history, past medical history, past social history, past surgical history and problem list     Review of Systems   Constitutional: Negative  HENT: Negative  Eyes: Negative  Respiratory: Negative  Cardiovascular: Negative  Gastrointestinal: Negative  Endocrine: Negative  Genitourinary: Negative  Musculoskeletal: Negative  Skin: Negative  Allergic/Immunologic: Negative  Neurological: Negative  Hematological: Negative  Psychiatric/Behavioral: Negative  Objective:      /70 (BP Location: Right arm, Patient Position: Sitting, Cuff Size: Standard)   Temp 99 1 °F (37 3 °C) (Tympanic)   Ht 5' 10" (1 778 m)   Wt 94 8 kg (209 lb)   BMI 29 99 kg/m²          Physical Exam   Constitutional: He is oriented to person, place, and time  He appears well-developed and well-nourished  No distress  HENT:   Head: Normocephalic  Right Ear: External ear normal    Left Ear: External ear normal    Mouth/Throat: Oropharynx is clear and moist  No oropharyngeal exudate  Eyes: EOM are normal  Pupils are equal, round, and reactive to light  Right eye exhibits no discharge  Left eye exhibits no discharge  No scleral icterus  Neck: Normal range of motion  Neck supple  No thyromegaly present  Cardiovascular: Normal rate, regular rhythm, normal heart sounds and intact distal pulses  Exam reveals no gallop and no friction rub  No murmur heard  Pulmonary/Chest: Effort normal and breath sounds normal  No respiratory distress  He has no wheezes  He has no rales  He exhibits no tenderness  Abdominal: Soft  Bowel sounds are normal  He exhibits no distension  There is no tenderness  There is no rebound and no guarding  Musculoskeletal: Normal range of motion  He exhibits no edema or tenderness  Lymphadenopathy:     He has no cervical adenopathy  Neurological: He is oriented to person, place, and time  No cranial nerve deficit  He exhibits normal muscle tone  Coordination normal    Skin: Skin is warm and dry  No rash noted  He is not diaphoretic  No erythema  No pallor  Psychiatric: He has a normal mood and affect  His behavior is normal  Judgment and thought content normal    Nursing note and vitals reviewed

## 2018-12-10 ENCOUNTER — OFFICE VISIT (OUTPATIENT)
Dept: FAMILY MEDICINE CLINIC | Facility: CLINIC | Age: 58
End: 2018-12-10
Payer: COMMERCIAL

## 2018-12-10 VITALS
DIASTOLIC BLOOD PRESSURE: 64 MMHG | BODY MASS INDEX: 29.63 KG/M2 | WEIGHT: 207 LBS | HEIGHT: 70 IN | TEMPERATURE: 99 F | SYSTOLIC BLOOD PRESSURE: 124 MMHG

## 2018-12-10 DIAGNOSIS — M10.00 IDIOPATHIC GOUT, UNSPECIFIED CHRONICITY, UNSPECIFIED SITE: ICD-10-CM

## 2018-12-10 DIAGNOSIS — G89.29 CHRONIC MIDLINE LOW BACK PAIN WITHOUT SCIATICA: ICD-10-CM

## 2018-12-10 DIAGNOSIS — K21.00 GASTROESOPHAGEAL REFLUX DISEASE WITH ESOPHAGITIS: ICD-10-CM

## 2018-12-10 DIAGNOSIS — Z12.11 COLON CANCER SCREENING: Primary | ICD-10-CM

## 2018-12-10 DIAGNOSIS — M54.50 CHRONIC MIDLINE LOW BACK PAIN WITHOUT SCIATICA: ICD-10-CM

## 2018-12-10 PROCEDURE — 99213 OFFICE O/P EST LOW 20 MIN: CPT | Performed by: FAMILY MEDICINE

## 2018-12-10 PROCEDURE — 3008F BODY MASS INDEX DOCD: CPT | Performed by: FAMILY MEDICINE

## 2018-12-10 NOTE — PROGRESS NOTES
Assessment/Plan:  Patient will stop aspirin therapy due to recent studies  The labs reviewed  Continue with other medication refills will be given as needed   Diagnoses and all orders for this visit:    Colon cancer screening  -     Ambulatory referral to Gastroenterology; Future    Chronic midline low back pain without sciatica    Idiopathic gout, unspecified chronicity, unspecified site    Gastroesophageal reflux disease with esophagitis    Other orders  -     Cancel: Occult Blood, Fecal Immunochemical; Future          Subjective:      Patient ID: Akanksha Seymour is a 63 y o  male  Patient follow-up on chronic back pain as well as esophageal reflux  Both conditions are fairly stable overall  Patient still taking aspirin daily  Studies discussed with him  No chest pain or shortness of breath  Patient does see Cardiology yearly  Everything has been stable in this regard  Normal urination defecation  Patient does not need refills at the present time  Patient has had reaction to flu shot in the past does not wish to receive it at this time  The following portions of the patient's history were reviewed and updated as appropriate: allergies, current medications, past family history, past medical history, past social history, past surgical history and problem list     Review of Systems   Constitutional: Negative  HENT: Negative  Eyes: Negative  Respiratory: Negative  Cardiovascular: Negative  Gastrointestinal: Negative  Endocrine: Negative  Genitourinary: Negative  Musculoskeletal: Positive for back pain  Skin: Negative  Allergic/Immunologic: Negative  Neurological: Negative  Hematological: Negative  Psychiatric/Behavioral: Negative  Objective:      /64 (BP Location: Right arm)   Temp 99 °F (37 2 °C)   Ht 5' 10" (1 778 m)   Wt 93 9 kg (207 lb)   BMI 29 70 kg/m²          Physical Exam   Constitutional: He is oriented to person, place, and time   He appears well-developed and well-nourished  No distress  HENT:   Head: Normocephalic  Right Ear: External ear normal    Left Ear: External ear normal    Mouth/Throat: Oropharynx is clear and moist  No oropharyngeal exudate  Eyes: Pupils are equal, round, and reactive to light  EOM are normal  Right eye exhibits no discharge  Left eye exhibits no discharge  No scleral icterus  Neck: Normal range of motion  Neck supple  No thyromegaly present  Cardiovascular: Normal rate, regular rhythm, normal heart sounds and intact distal pulses  Exam reveals no gallop and no friction rub  No murmur heard  Pulmonary/Chest: Effort normal and breath sounds normal  No respiratory distress  He has no wheezes  He has no rales  He exhibits no tenderness  Abdominal: Soft  Bowel sounds are normal  He exhibits no distension  There is no tenderness  There is no rebound and no guarding  Musculoskeletal: Normal range of motion  He exhibits no edema or tenderness  Lymphadenopathy:     He has no cervical adenopathy  Neurological: He is oriented to person, place, and time  No cranial nerve deficit  He exhibits normal muscle tone  Coordination normal    Skin: Skin is warm and dry  No rash noted  He is not diaphoretic  No erythema  No pallor  Psychiatric: He has a normal mood and affect  His behavior is normal  Judgment and thought content normal    Nursing note and vitals reviewed

## 2019-02-09 DIAGNOSIS — M10.00 IDIOPATHIC GOUT, UNSPECIFIED CHRONICITY, UNSPECIFIED SITE: ICD-10-CM

## 2019-02-11 RX ORDER — ALLOPURINOL 300 MG/1
TABLET ORAL
Qty: 45 TABLET | Refills: 0 | Status: SHIPPED | OUTPATIENT
Start: 2019-02-11 | End: 2019-05-14 | Stop reason: SDUPTHER

## 2019-05-14 ENCOUNTER — OFFICE VISIT (OUTPATIENT)
Dept: FAMILY MEDICINE CLINIC | Facility: CLINIC | Age: 59
End: 2019-05-14
Payer: COMMERCIAL

## 2019-05-14 VITALS
BODY MASS INDEX: 28.92 KG/M2 | HEIGHT: 70 IN | DIASTOLIC BLOOD PRESSURE: 72 MMHG | TEMPERATURE: 96.9 F | WEIGHT: 202 LBS | SYSTOLIC BLOOD PRESSURE: 122 MMHG

## 2019-05-14 DIAGNOSIS — K21.00 GASTROESOPHAGEAL REFLUX DISEASE WITH ESOPHAGITIS: ICD-10-CM

## 2019-05-14 DIAGNOSIS — M10.00 IDIOPATHIC GOUT, UNSPECIFIED CHRONICITY, UNSPECIFIED SITE: Primary | ICD-10-CM

## 2019-05-14 DIAGNOSIS — R07.1 CHEST PAIN ON BREATHING: ICD-10-CM

## 2019-05-14 DIAGNOSIS — Z12.11 SCREENING FOR COLON CANCER: ICD-10-CM

## 2019-05-14 DIAGNOSIS — M77.11 EPICONDYLITIS, LATERAL, RIGHT: ICD-10-CM

## 2019-05-14 PROCEDURE — 3008F BODY MASS INDEX DOCD: CPT | Performed by: FAMILY MEDICINE

## 2019-05-14 PROCEDURE — 1036F TOBACCO NON-USER: CPT | Performed by: FAMILY MEDICINE

## 2019-05-14 PROCEDURE — 99214 OFFICE O/P EST MOD 30 MIN: CPT | Performed by: FAMILY MEDICINE

## 2019-05-14 RX ORDER — ALLOPURINOL 300 MG/1
300 TABLET ORAL EVERY OTHER DAY
Qty: 45 TABLET | Refills: 0 | Status: SHIPPED | OUTPATIENT
Start: 2019-05-14 | End: 2019-07-31 | Stop reason: SDUPTHER

## 2019-05-14 RX ORDER — MELOXICAM 15 MG/1
15 TABLET ORAL DAILY
Qty: 90 TABLET | Refills: 0 | Status: SHIPPED | OUTPATIENT
Start: 2019-05-14 | End: 2019-07-12

## 2019-05-14 RX ORDER — PANTOPRAZOLE SODIUM 40 MG/1
40 TABLET, DELAYED RELEASE ORAL DAILY
Qty: 90 TABLET | Refills: 1 | Status: SHIPPED | OUTPATIENT
Start: 2019-05-14 | End: 2019-12-19 | Stop reason: SDUPTHER

## 2019-07-09 RX ORDER — ASPIRIN 81 MG/1
81 TABLET ORAL DAILY
COMMUNITY
End: 2019-07-12

## 2019-07-10 ENCOUNTER — TELEPHONE (OUTPATIENT)
Dept: FAMILY MEDICINE CLINIC | Facility: CLINIC | Age: 59
End: 2019-07-10

## 2019-07-10 NOTE — TELEPHONE ENCOUNTER
----- Message from Wendi Santana DO sent at 7/10/2019  1:26 PM EDT -----   Please call patient suggest appointment to discuss all symptoms and workup in detail  ----- Message -----  From: Alex Fagan  Sent: 7/9/2019   2:26 PM EDT  To: Wendi Santana DO    Wife called today and stated that medication prescribe  Is not working, they would like to speak with you ASAP due to he is still having pain    [phone]

## 2019-07-12 ENCOUNTER — OFFICE VISIT (OUTPATIENT)
Dept: FAMILY MEDICINE CLINIC | Facility: CLINIC | Age: 59
End: 2019-07-12
Payer: COMMERCIAL

## 2019-07-12 VITALS
HEIGHT: 70 IN | TEMPERATURE: 97.7 F | WEIGHT: 206 LBS | DIASTOLIC BLOOD PRESSURE: 70 MMHG | SYSTOLIC BLOOD PRESSURE: 122 MMHG | BODY MASS INDEX: 29.49 KG/M2

## 2019-07-12 DIAGNOSIS — Z12.11 SCREENING FOR COLON CANCER: ICD-10-CM

## 2019-07-12 DIAGNOSIS — M77.11 EPICONDYLITIS, LATERAL, RIGHT: Primary | ICD-10-CM

## 2019-07-12 DIAGNOSIS — F51.01 PRIMARY INSOMNIA: ICD-10-CM

## 2019-07-12 PROCEDURE — 3008F BODY MASS INDEX DOCD: CPT | Performed by: FAMILY MEDICINE

## 2019-07-12 PROCEDURE — 99213 OFFICE O/P EST LOW 20 MIN: CPT | Performed by: FAMILY MEDICINE

## 2019-07-12 PROCEDURE — 1036F TOBACCO NON-USER: CPT | Performed by: FAMILY MEDICINE

## 2019-07-12 RX ORDER — ZOLPIDEM TARTRATE 10 MG/1
10 TABLET ORAL
Qty: 30 TABLET | Refills: 0 | Status: SHIPPED | OUTPATIENT
Start: 2019-07-12 | End: 2019-11-18

## 2019-07-12 RX ORDER — CELECOXIB 200 MG/1
200 CAPSULE ORAL 2 TIMES DAILY
Qty: 30 CAPSULE | Refills: 2 | Status: SHIPPED | OUTPATIENT
Start: 2019-07-12 | End: 2019-11-18

## 2019-07-12 NOTE — PROGRESS NOTES
Assessment/Plan:     Diagnoses and all orders for this visit:    Epicondylitis, lateral, right  -     Ambulatory referral to Orthopedic Surgery; Future  -     celecoxib (CeleBREX) 200 mg capsule; Take 1 capsule (200 mg total) by mouth 2 (two) times a day    Screening for colon cancer    Primary insomnia  -     zolpidem (AMBIEN) 10 mg tablet; Take 1 tablet (10 mg total) by mouth daily at bedtime as needed for sleep    Other orders  -     Cancel: Ambulatory referral to Gastroenterology; Future          Subjective:      Patient ID: Moiz Ventura is a 63 y o  male  Patient is here with ongoing right elbow pain along with insomnia associated with this  Patient we can multiple times a night due to pain the  No chest pain or shortness of breath  The following portions of the patient's history were reviewed and updated as appropriate: allergies, current medications, past family history, past medical history, past social history, past surgical history and problem list     Review of Systems   Constitutional: Negative  HENT: Negative  Eyes: Negative  Respiratory: Negative  Cardiovascular: Negative  Gastrointestinal: Negative  Endocrine: Negative  Genitourinary: Negative  Musculoskeletal: Positive for arthralgias  Skin: Negative  Allergic/Immunologic: Negative  Neurological: Negative  Hematological: Negative  Psychiatric/Behavioral: Negative  Objective:      /70 (BP Location: Right arm, Patient Position: Sitting, Cuff Size: Adult)   Temp 97 7 °F (36 5 °C) (Tympanic)   Ht 5' 10" (1 778 m)   Wt 93 4 kg (206 lb)   BMI 29 56 kg/m²          Physical Exam   Constitutional: He is oriented to person, place, and time  He appears well-developed and well-nourished  No distress  HENT:   Head: Normocephalic  Right Ear: External ear normal    Left Ear: External ear normal    Mouth/Throat: Oropharynx is clear and moist  No oropharyngeal exudate     Eyes: Pupils are equal, round, and reactive to light  EOM are normal  Right eye exhibits no discharge  Left eye exhibits no discharge  No scleral icterus  Neck: Normal range of motion  Neck supple  No thyromegaly present  Cardiovascular: Normal rate, regular rhythm, normal heart sounds and intact distal pulses  Exam reveals no gallop and no friction rub  No murmur heard  Pulmonary/Chest: Effort normal and breath sounds normal  No respiratory distress  He has no wheezes  He has no rales  He exhibits no tenderness  Musculoskeletal: Normal range of motion  He exhibits tenderness  He exhibits no edema  Lymphadenopathy:     He has no cervical adenopathy  Neurological: He is oriented to person, place, and time  No cranial nerve deficit  He exhibits normal muscle tone  Coordination normal    Skin: Skin is warm and dry  No rash noted  He is not diaphoretic  No erythema  No pallor  Psychiatric: He has a normal mood and affect  His behavior is normal  Judgment and thought content normal    Nursing note and vitals reviewed

## 2019-07-18 ENCOUNTER — OFFICE VISIT (OUTPATIENT)
Dept: CARDIOLOGY CLINIC | Facility: CLINIC | Age: 59
End: 2019-07-18
Payer: COMMERCIAL

## 2019-07-18 VITALS
HEART RATE: 47 BPM | HEIGHT: 70 IN | DIASTOLIC BLOOD PRESSURE: 68 MMHG | SYSTOLIC BLOOD PRESSURE: 106 MMHG | WEIGHT: 205 LBS | BODY MASS INDEX: 29.35 KG/M2

## 2019-07-18 DIAGNOSIS — I42.9 CARDIOMYOPATHY, UNSPECIFIED TYPE (HCC): Primary | ICD-10-CM

## 2019-07-18 PROCEDURE — 99244 OFF/OP CNSLTJ NEW/EST MOD 40: CPT | Performed by: INTERNAL MEDICINE

## 2019-07-18 PROCEDURE — 93000 ELECTROCARDIOGRAM COMPLETE: CPT | Performed by: INTERNAL MEDICINE

## 2019-07-18 NOTE — PROGRESS NOTES
Cardiology Consultation      Rodney Torres  1960  [id number]  [address] [facility] [address]  [address]  [phone]    1  Cardiomyopathy, unspecified type ([facility] )  POCT ECG    Echo complete with contrast if indicated    Comprehensive metabolic panel    TSH, 3rd generation with Free T4 reflex    Lipid panel          Discussion/Summary    1  By report, patient with a history of transient cardiomyopathy, echocardiogram report coronary did Health with LVEF 50-55% and coincidental or size of normal aortic root per report at 37 to 40 mm      Plan:  Patient states does not recall exactly what happened many years ago but ultimately was referred to the [address] [place] [address] after his echocardiogram was performed  Underwent cardiac catheterization which showed normal vessels  He states they could not find anything at the [address] [place] [address]  That being said, echocardiogram last October 2018 coronary did Health report does not show any significant abnormalities  Patient takes no cardiac medications  No history of hyperlipidemia  Familial history of coronary artery disease with father in his 76s having a myocardial infarction and brother had a stent  Will continue recommendation daily exercise and sensible/Mediterranean type diet with follow-up labs and echocardiogram this fall    History:     Patient comes as a new patient today after taking care of by Cardiology at [facility]  Patient does not recall the details but states had an echocardiogram for non reason  This showed decreased function per his report, states he was sent on the [address] [place] [address] underwent of bunch of tests which include cardiac catheterization  Patient states he could not find anything  Patient does not relate any symptoms  He is not on any cardiac medications    He has been getting annual followups with coronary and Health Cardiology  Father had a myocardial infarction in his 76s  Mother had a stent at the age of 58  No known coronary artery disease as detailed had a catheterization which was nonrevealing  Patient has no history of hyperlipidemia  States blood pressure tends to run on the low side  He has not any antihypertensives  Patient is not currently exercising but does state usually exercises routinely  Able to walk several miles without difficulty  No symptoms of shortness of breath chest tightness lightheadedness dizziness      Patient Active Problem List   Diagnosis    Chronic lumbar pain    Acid reflux    Esophageal reflux    Low vitamin D level    Gout    URTI (acute upper respiratory infection)    Influenza    Visual disturbance    Shoulder joint pain    Epicondylitis, lateral, right    Primary insomnia     Past Medical History:   Diagnosis Date    BPH (benign prostatic hyperplasia)     Elevated LDL cholesterol level 09/18/2018    GERD (gastroesophageal reflux disease)     Gout     Herpes     simplex - lower lip     Hx of splenomegaly     mild    Hyperuricemia     Idiopathic cardiomyopathy (HCC)     Parapelvic renal cyst     Sinus bradycardia      Social History     Socioeconomic History    Marital status: /Civil Union     Spouse name: Not on file    Number of children: Not on file    Years of education: Not on file    Highest education level: Not on file   Occupational History    Not on file   Social Needs    Financial resource strain: Not on file    Food insecurity:     Worry: Not on file     Inability: Not on file    Transportation needs:     Medical: Not on file     Non-medical: Not on file   Tobacco Use    Smoking status: Never Smoker    Smokeless tobacco: Never Used   Substance and Sexual Activity    Alcohol use: Yes     Comment: socially    Drug use: No    Sexual activity: Not on file   Lifestyle    Physical activity:     Days per week: Not on file Minutes per session: Not on file    Stress: Not on file   Relationships    Social connections:     Talks on phone: Not on file     Gets together: Not on file     Attends Sabianist service: Not on file     Active member of club or organization: Not on file     Attends meetings of clubs or organizations: Not on file     Relationship status: Not on file    Intimate partner violence:     Fear of current or ex partner: Not on file     Emotionally abused: Not on file     Physically abused: Not on file     Forced sexual activity: Not on file   Other Topics Concern    Not on file   Social History Narrative    Not on file      Family History   Problem Relation Age of Onset    Cancer Family     Diabetes Family         mellitus     Heart disease Family     Hypertension Family     Parkinsonism Family     Parkinsonism Mother     Heart attack Father     Kidney disease Father     Diabetes Paternal Grandmother      Past Surgical History:   Procedure Laterality Date    CARDIAC CATHETERIZATION  44/85/4286    UMBILICAL HERNIA REPAIR      WRIST SURGERY         Current Outpatient Medications:     allopurinol (ZYLOPRIM) 300 mg tablet, Take 1 tablet (300 mg total) by mouth every other day, Disp: 45 tablet, Rfl: 0    celecoxib (CeleBREX) 200 mg capsule, Take 1 capsule (200 mg total) by mouth 2 (two) times a day, Disp: 30 capsule, Rfl: 2    cholecalciferol (VITAMIN D3) 1,000 units tablet, Take by mouth daily , Disp: , Rfl:     pantoprazole (PROTONIX) 40 mg tablet, Take 1 tablet (40 mg total) by mouth daily (Patient taking differently: Take 40 mg by mouth as needed ), Disp: 90 tablet, Rfl: 1    zolpidem (AMBIEN) 10 mg tablet, Take 1 tablet (10 mg total) by mouth daily at bedtime as needed for sleep, Disp: 30 tablet, Rfl: 0  Allergies   Allergen Reactions    Other        Social, Family and medication history as listed, reviewed and updated as necessary    Labs:   Lab Results   Component Value Date     02/11/2015 K 4 1 02/11/2015     02/11/2015    CO2 29 02/11/2015    BUN 20 02/11/2015    CREATININE 1 12 02/11/2015    CREATININE 1 08 09/03/2014    GLUCOSE 84 02/11/2015    CALCIUM 9 1 02/11/2015       Lab Results   Component Value Date    WBC 6 39 02/11/2015    HGB 14 3 02/11/2015    HGB 15 4 09/03/2014    HCT 41 4 02/11/2015    HCT 44 8 09/03/2014     02/11/2015     09/03/2014       Lab Results   Component Value Date    CHOL 161 02/11/2015    CHOL 179 09/03/2014     Lab Results   Component Value Date    HDL 48 02/11/2015    HDL 52 09/03/2014     Lab Results   Component Value Date    LDLCALC 81 02/11/2015    LDLCALC 112 (H) 09/03/2014     Lab Results   Component Value Date    TRIG 162 02/11/2015    TRIG 75 09/03/2014     No results found for: LDLDIRECT    Lab Results   Component Value Date    ALT 24 02/11/2015    ALT 31 09/03/2014    AST 13 02/11/2015    AST 17 09/03/2014             No results found for: NTBNP    No results found for: HGBA1C    Imaging: Reviewed in epic        Review of Systems:  14 systems reviewed and negative with exception of the above     PHYSICAL EXAM:        Vitals:    07/18/19 1311   BP: 106/68   Pulse: (!) 47     Body mass index is 29 41 kg/m²  Weight (last 2 days)     Date/Time   Weight    07/18/19 1311   93 (205)                 Gen: No acute distress  HEENT: anicteric, mucous membranes moist  Neck: supple, no jugular venous distention, or carotid bruit  Heart: regular, normal s1 and s2, no murmur/rub or gallop  Lungs :clear to auscultation bilaterally, no rales/rhonchi or wheeze  Abdomen: soft nontender, normoactive bowel sounds, no organomegaly  Ext: warm and perfused, normal femoral pulses, no edema, or clubbing  Skin: warm, no rashes  Neuro: AAO x 3, no focal findings  Psychiatric: normal affect  Musculoskeletal: no obvious joint deformities

## 2019-07-31 DIAGNOSIS — M10.00 IDIOPATHIC GOUT, UNSPECIFIED CHRONICITY, UNSPECIFIED SITE: ICD-10-CM

## 2019-08-01 RX ORDER — ALLOPURINOL 300 MG/1
TABLET ORAL
Qty: 45 TABLET | Refills: 0 | Status: SHIPPED | OUTPATIENT
Start: 2019-08-01 | End: 2019-10-27 | Stop reason: SDUPTHER

## 2019-08-12 ENCOUNTER — APPOINTMENT (OUTPATIENT)
Dept: LAB | Facility: MEDICAL CENTER | Age: 59
End: 2019-08-12
Payer: COMMERCIAL

## 2019-08-12 DIAGNOSIS — M10.00 IDIOPATHIC GOUT, UNSPECIFIED CHRONICITY, UNSPECIFIED SITE: ICD-10-CM

## 2019-08-12 DIAGNOSIS — K21.00 GASTROESOPHAGEAL REFLUX DISEASE WITH ESOPHAGITIS: ICD-10-CM

## 2019-08-12 DIAGNOSIS — I42.9 CARDIOMYOPATHY, UNSPECIFIED TYPE (HCC): ICD-10-CM

## 2019-08-12 DIAGNOSIS — M77.11 EPICONDYLITIS, LATERAL, RIGHT: ICD-10-CM

## 2019-08-12 LAB
ALBUMIN SERPL BCP-MCNC: 4 G/DL (ref 3.5–5)
ALP SERPL-CCNC: 48 U/L (ref 46–116)
ALT SERPL W P-5'-P-CCNC: 20 U/L (ref 12–78)
ANION GAP SERPL CALCULATED.3IONS-SCNC: 6 MMOL/L (ref 4–13)
AST SERPL W P-5'-P-CCNC: 8 U/L (ref 5–45)
BASOPHILS # BLD AUTO: 0.02 THOUSANDS/ΜL (ref 0–0.1)
BASOPHILS NFR BLD AUTO: 0 % (ref 0–1)
BILIRUB SERPL-MCNC: 0.62 MG/DL (ref 0.2–1)
BUN SERPL-MCNC: 14 MG/DL (ref 5–25)
CALCIUM SERPL-MCNC: 9.1 MG/DL (ref 8.3–10.1)
CHLORIDE SERPL-SCNC: 110 MMOL/L (ref 100–108)
CHOLEST SERPL-MCNC: 163 MG/DL (ref 50–200)
CO2 SERPL-SCNC: 29 MMOL/L (ref 21–32)
CREAT SERPL-MCNC: 0.99 MG/DL (ref 0.6–1.3)
EOSINOPHIL # BLD AUTO: 0.04 THOUSAND/ΜL (ref 0–0.61)
EOSINOPHIL NFR BLD AUTO: 1 % (ref 0–6)
ERYTHROCYTE [DISTWIDTH] IN BLOOD BY AUTOMATED COUNT: 13.2 % (ref 11.6–15.1)
GFR SERPL CREATININE-BSD FRML MDRD: 84 ML/MIN/1.73SQ M
GLUCOSE P FAST SERPL-MCNC: 91 MG/DL (ref 65–99)
HCT VFR BLD AUTO: 43.2 % (ref 36.5–49.3)
HDLC SERPL-MCNC: 56 MG/DL (ref 40–60)
HGB BLD-MCNC: 14.3 G/DL (ref 12–17)
IMM GRANULOCYTES # BLD AUTO: 0.02 THOUSAND/UL (ref 0–0.2)
IMM GRANULOCYTES NFR BLD AUTO: 0 % (ref 0–2)
LDLC SERPL CALC-MCNC: 92 MG/DL (ref 0–100)
LYMPHOCYTES # BLD AUTO: 1.4 THOUSANDS/ΜL (ref 0.6–4.47)
LYMPHOCYTES NFR BLD AUTO: 27 % (ref 14–44)
MCH RBC QN AUTO: 33.3 PG (ref 26.8–34.3)
MCHC RBC AUTO-ENTMCNC: 33.1 G/DL (ref 31.4–37.4)
MCV RBC AUTO: 101 FL (ref 82–98)
MONOCYTES # BLD AUTO: 0.5 THOUSAND/ΜL (ref 0.17–1.22)
MONOCYTES NFR BLD AUTO: 10 % (ref 4–12)
NEUTROPHILS # BLD AUTO: 3.24 THOUSANDS/ΜL (ref 1.85–7.62)
NEUTS SEG NFR BLD AUTO: 62 % (ref 43–75)
NONHDLC SERPL-MCNC: 107 MG/DL
NRBC BLD AUTO-RTO: 0 /100 WBCS
PLATELET # BLD AUTO: 151 THOUSANDS/UL (ref 149–390)
PMV BLD AUTO: 10.4 FL (ref 8.9–12.7)
POTASSIUM SERPL-SCNC: 4.6 MMOL/L (ref 3.5–5.3)
PROT SERPL-MCNC: 6.8 G/DL (ref 6.4–8.2)
PSA SERPL-MCNC: 0.3 NG/ML (ref 0–4)
RBC # BLD AUTO: 4.3 MILLION/UL (ref 3.88–5.62)
SODIUM SERPL-SCNC: 145 MMOL/L (ref 136–145)
TRIGL SERPL-MCNC: 77 MG/DL
TSH SERPL DL<=0.05 MIU/L-ACNC: 2.53 UIU/ML (ref 0.36–3.74)
URATE SERPL-MCNC: 4.6 MG/DL (ref 4.2–8)
WBC # BLD AUTO: 5.22 THOUSAND/UL (ref 4.31–10.16)

## 2019-08-12 PROCEDURE — 85025 COMPLETE CBC W/AUTO DIFF WBC: CPT

## 2019-08-12 PROCEDURE — 80053 COMPREHEN METABOLIC PANEL: CPT

## 2019-08-12 PROCEDURE — 84550 ASSAY OF BLOOD/URIC ACID: CPT

## 2019-08-12 PROCEDURE — G0103 PSA SCREENING: HCPCS

## 2019-08-12 PROCEDURE — 36415 COLL VENOUS BLD VENIPUNCTURE: CPT

## 2019-08-12 PROCEDURE — 80061 LIPID PANEL: CPT

## 2019-08-12 PROCEDURE — 84443 ASSAY THYROID STIM HORMONE: CPT

## 2019-10-01 ENCOUNTER — HOSPITAL ENCOUNTER (OUTPATIENT)
Dept: NON INVASIVE DIAGNOSTICS | Facility: CLINIC | Age: 59
Discharge: HOME | End: 2019-10-01
Payer: COMMERCIAL

## 2019-10-01 DIAGNOSIS — I42.9 CARDIOMYOPATHY, UNSPECIFIED TYPE (HCC): ICD-10-CM

## 2019-10-01 PROCEDURE — 93306 TTE W/DOPPLER COMPLETE: CPT | Performed by: INTERNAL MEDICINE

## 2019-10-01 PROCEDURE — 93306 TTE W/DOPPLER COMPLETE: CPT

## 2019-10-27 DIAGNOSIS — M10.00 IDIOPATHIC GOUT, UNSPECIFIED CHRONICITY, UNSPECIFIED SITE: ICD-10-CM

## 2019-10-28 RX ORDER — ALLOPURINOL 300 MG/1
TABLET ORAL
Qty: 45 TABLET | Refills: 0 | Status: SHIPPED | OUTPATIENT
Start: 2019-10-28 | End: 2020-01-30

## 2019-11-07 ENCOUNTER — TELEPHONE (OUTPATIENT)
Dept: CARDIOLOGY CLINIC | Facility: CLINIC | Age: 59
End: 2019-11-07

## 2019-11-07 DIAGNOSIS — I42.8 NONISCHEMIC CARDIOMYOPATHY (HCC): Primary | ICD-10-CM

## 2019-11-07 RX ORDER — CARVEDILOL 3.12 MG/1
3.12 TABLET ORAL 2 TIMES DAILY WITH MEALS
Qty: 180 TABLET | Refills: 3 | Status: SHIPPED | OUTPATIENT
Start: 2019-11-07 | End: 2019-12-19

## 2019-11-07 RX ORDER — LISINOPRIL 2.5 MG/1
10 TABLET ORAL DAILY
Qty: 90 TABLET | Refills: 3 | Status: SHIPPED | OUTPATIENT
Start: 2019-11-07 | End: 2019-12-19

## 2019-11-08 DIAGNOSIS — R12 HEARTBURN: Primary | ICD-10-CM

## 2019-11-18 ENCOUNTER — OFFICE VISIT (OUTPATIENT)
Dept: FAMILY MEDICINE CLINIC | Facility: CLINIC | Age: 59
End: 2019-11-18
Payer: COMMERCIAL

## 2019-11-18 VITALS
TEMPERATURE: 96.8 F | BODY MASS INDEX: 28.98 KG/M2 | SYSTOLIC BLOOD PRESSURE: 102 MMHG | DIASTOLIC BLOOD PRESSURE: 68 MMHG | WEIGHT: 207 LBS | HEIGHT: 71 IN

## 2019-11-18 DIAGNOSIS — Z00.00 WELL ADULT EXAM: Primary | ICD-10-CM

## 2019-11-18 PROCEDURE — 99396 PREV VISIT EST AGE 40-64: CPT | Performed by: FAMILY MEDICINE

## 2019-11-18 NOTE — PROGRESS NOTES
Assessment/Plan:  Labs, PSA all look normal at this time  Guidance given  Patient will see Cardiology per routine as well as GI per routine  Patient will continue with current list of medications  Patient had flu shot  Recommend follow-up yearly       There are no diagnoses linked to this encounter  Subjective:        Patient ID: Alexys Portillo is a 63 y o  male  Patient is here for wellness exam   Patient is seeing Cardiology in the near future  Patient did have echocardiogram done  Patient is seeing GI in December also  No GERD symptoms presently  Patient is on Protonix  Patient on lisinopril as well as carvedilol  The following portions of the patient's history were reviewed and updated as appropriate: allergies, current medications, past family history, past medical history, past social history, past surgical history and problem list       Review of Systems   Constitutional: Negative  HENT: Negative  Eyes: Negative  Respiratory: Negative  Cardiovascular: Negative  Gastrointestinal: Negative  Endocrine: Negative  Genitourinary: Negative  Musculoskeletal: Negative  Skin: Negative  Allergic/Immunologic: Negative  Neurological: Negative  Hematological: Negative  Psychiatric/Behavioral: Negative  Objective:               /68 (BP Location: Right arm, Patient Position: Sitting, Cuff Size: Adult)   Temp (!) 96 8 °F (36 °C) (Tympanic)   Ht 5' 11" (1 803 m)   Wt 93 9 kg (207 lb)   BMI 28 87 kg/m²          Physical Exam   Constitutional: He appears well-developed and well-nourished  No distress  HENT:   Head: Normocephalic  Right Ear: External ear normal    Left Ear: External ear normal    Mouth/Throat: Oropharynx is clear and moist  No oropharyngeal exudate  Eyes: Pupils are equal, round, and reactive to light  EOM are normal  Right eye exhibits no discharge  Left eye exhibits no discharge  No scleral icterus     Neck: Normal range of motion  Neck supple  No thyromegaly present  Cardiovascular: Normal rate, regular rhythm, normal heart sounds and intact distal pulses  Exam reveals no gallop and no friction rub  No murmur heard  Pulmonary/Chest: Effort normal and breath sounds normal  No respiratory distress  He has no wheezes  He has no rales  He exhibits no tenderness  Abdominal: Soft  Bowel sounds are normal  He exhibits no distension  There is no tenderness  There is no rebound and no guarding  Musculoskeletal: Normal range of motion  He exhibits no edema or tenderness  Lymphadenopathy:     He has no cervical adenopathy  Neurological: He is alert  No cranial nerve deficit  He exhibits normal muscle tone  Coordination normal    Skin: Skin is warm and dry  No rash noted  He is not diaphoretic  No erythema  No pallor  Psychiatric: He has a normal mood and affect  His behavior is normal  Judgment and thought content normal    Nursing note and vitals reviewed

## 2019-12-17 ENCOUNTER — TELEPHONE (OUTPATIENT)
Dept: CARDIOLOGY CLINIC | Facility: CLINIC | Age: 59
End: 2019-12-17

## 2019-12-17 NOTE — TELEPHONE ENCOUNTER
Ok  He can stop both medications for now and see if gets better   Should  Get in office this week w Nap if able  thx

## 2019-12-17 NOTE — TELEPHONE ENCOUNTER
Advised pt of your recommendation  Pt states he doesn't feel he needs to go to ER  He was seen by his PCP a few weeks ago while he was experiencing these symptoms and it was his PCp who has advised him it maybe his medication  Advised he should go to ER for evaluation  Pt states he doesn't feel he needs to go to ER  He will contact his PCP again to have his BP checked and EKG  Offered and appt with AP he declined Pt states he will contact our office after seeing PCP

## 2019-12-17 NOTE — TELEPHONE ENCOUNTER
P/c stating since starting carvedilol and lisinopril a month ago he is experiencing chest tightness and uneasy feeling all over  Hes not sure which medication but he just doesn't feel right  He is scheduled 12/31 to see GI for Acid reflux, which has gotten worse      Please advise

## 2019-12-17 NOTE — TELEPHONE ENCOUNTER
Coreg or lisinopril should not cause chest tightness  If he is experiencing chest tightness and uneasy feeling he should go to the ED

## 2019-12-18 NOTE — TELEPHONE ENCOUNTER
S/W pt  Advised  Pt verbalized understanding  He has an appt tomorrow with pcp  He will contact our office in a week to let us know how he feels

## 2019-12-19 ENCOUNTER — OFFICE VISIT (OUTPATIENT)
Dept: FAMILY MEDICINE CLINIC | Facility: CLINIC | Age: 59
End: 2019-12-19
Payer: COMMERCIAL

## 2019-12-19 VITALS
SYSTOLIC BLOOD PRESSURE: 114 MMHG | TEMPERATURE: 97.8 F | BODY MASS INDEX: 28.56 KG/M2 | DIASTOLIC BLOOD PRESSURE: 78 MMHG | HEIGHT: 71 IN | WEIGHT: 204 LBS

## 2019-12-19 DIAGNOSIS — R07.2 PRECORDIAL PAIN: Primary | ICD-10-CM

## 2019-12-19 DIAGNOSIS — I49.3 PVC (PREMATURE VENTRICULAR CONTRACTION): ICD-10-CM

## 2019-12-19 DIAGNOSIS — K21.00 GASTROESOPHAGEAL REFLUX DISEASE WITH ESOPHAGITIS: ICD-10-CM

## 2019-12-19 DIAGNOSIS — R00.1 SINUS BRADYCARDIA: ICD-10-CM

## 2019-12-19 PROCEDURE — 99214 OFFICE O/P EST MOD 30 MIN: CPT | Performed by: FAMILY MEDICINE

## 2019-12-19 PROCEDURE — 3008F BODY MASS INDEX DOCD: CPT | Performed by: FAMILY MEDICINE

## 2019-12-19 PROCEDURE — 1036F TOBACCO NON-USER: CPT | Performed by: FAMILY MEDICINE

## 2019-12-19 PROCEDURE — 93000 ELECTROCARDIOGRAM COMPLETE: CPT | Performed by: FAMILY MEDICINE

## 2019-12-19 RX ORDER — PANTOPRAZOLE SODIUM 40 MG/1
40 TABLET, DELAYED RELEASE ORAL 2 TIMES DAILY
Qty: 180 TABLET | Refills: 1 | Status: SHIPPED | OUTPATIENT
Start: 2019-12-19 | End: 2020-11-15

## 2019-12-19 RX ORDER — LORAZEPAM 1 MG/1
1 TABLET ORAL 2 TIMES DAILY
Qty: 30 TABLET | Refills: 0 | Status: SHIPPED | OUTPATIENT
Start: 2019-12-19 | End: 2020-03-31 | Stop reason: SDUPTHER

## 2019-12-19 NOTE — PROGRESS NOTES
Assessment/Plan: the patient will increase Protonix to twice daily  Patient will follow with GI on December 31st   Patient will see Cardiology in follow-up  The January 16  EKG shows sinus bradycardia with PVCs  Patient go for Holter monitor  Patient will follow up with Cardiology per routine  Patient will remain off medications  The patient will try Ativan twice daily as directed  Guidance given  Patient will follow up in the next few weeks       Diagnoses and all orders for this visit:    Precordial pain  -     POCT ECG  -     Holter monitor - 48 hour; Future  -     LORazepam (ATIVAN) 1 mg tablet; Take 1 tablet (1 mg total) by mouth 2 (two) times a day    Gastroesophageal reflux disease with esophagitis  -     pantoprazole (PROTONIX) 40 mg tablet; Take 1 tablet (40 mg total) by mouth 2 (two) times a day    Sinus bradycardia    PVC (premature ventricular contraction)  -     Holter monitor - 48 hour; Future            Subjective:        Patient ID: Rashel Carney is a 63 y o  male  Patient is here with chest pain over the past week  Patient with increased stress  Patient did see Cardiology and Cardiology did stop lisinopril and Coreg  The patient with GERD symptoms daily  The patient feels as though he is under stress 24/7  Patient with 3 significant bouts of reflux recently  The Mylanta did help with the reflux symptoms  No shortness of breath or radicular symptoms  No vomiting no diaphoresis the  Patient feeling better today than yesterday  Patient stop blood pressure medications last night  The following portions of the patient's history were reviewed and updated as appropriate: allergies, current medications, past family history, past medical history, past social history, past surgical history and problem list       Review of Systems   Constitutional: Negative  HENT: Negative  Eyes: Negative  Respiratory: Negative  Cardiovascular: Positive for chest pain     Gastrointestinal: Negative  Endocrine: Negative  Genitourinary: Negative  Musculoskeletal: Negative  Skin: Negative  Allergic/Immunologic: Negative  Neurological: Negative  Hematological: Negative  Psychiatric/Behavioral: The patient is nervous/anxious  Objective:               /78 (BP Location: Left arm, Patient Position: Sitting, Cuff Size: Adult)   Temp 97 8 °F (36 6 °C) (Tympanic)   Ht 5' 11" (1 803 m)   Wt 92 5 kg (204 lb)   BMI 28 45 kg/m²          Physical Exam   Constitutional: He appears well-developed and well-nourished  No distress  HENT:   Head: Normocephalic  Right Ear: External ear normal    Left Ear: External ear normal    Mouth/Throat: Oropharynx is clear and moist  No oropharyngeal exudate  Eyes: Pupils are equal, round, and reactive to light  EOM are normal  Right eye exhibits no discharge  Left eye exhibits no discharge  No scleral icterus  Neck: Normal range of motion  Neck supple  No thyromegaly present  Cardiovascular: Normal rate, normal heart sounds and intact distal pulses  Exam reveals no gallop and no friction rub  No murmur heard  Mild irregularity of heart rate  Pulmonary/Chest: Effort normal and breath sounds normal  No respiratory distress  He has no wheezes  He has no rales  He exhibits no tenderness  Abdominal: Soft  Bowel sounds are normal  He exhibits no distension  There is no tenderness  There is no rebound and no guarding  Musculoskeletal: Normal range of motion  He exhibits no edema or tenderness  Lymphadenopathy:     He has no cervical adenopathy  Neurological: He is alert  No cranial nerve deficit  He exhibits normal muscle tone  Coordination normal    Skin: Skin is warm and dry  No rash noted  He is not diaphoretic  No erythema  No pallor  Psychiatric: He has a normal mood and affect  His behavior is normal  Judgment and thought content normal    Nursing note and vitals reviewed

## 2019-12-31 ENCOUNTER — OFFICE VISIT (OUTPATIENT)
Dept: GASTROENTEROLOGY | Facility: MEDICAL CENTER | Age: 59
End: 2019-12-31
Payer: COMMERCIAL

## 2019-12-31 VITALS
HEART RATE: 54 BPM | HEIGHT: 71 IN | SYSTOLIC BLOOD PRESSURE: 132 MMHG | WEIGHT: 210.6 LBS | BODY MASS INDEX: 29.48 KG/M2 | DIASTOLIC BLOOD PRESSURE: 72 MMHG | TEMPERATURE: 98.8 F

## 2019-12-31 DIAGNOSIS — R12 HEARTBURN: Primary | ICD-10-CM

## 2019-12-31 DIAGNOSIS — Z12.11 SCREENING FOR COLON CANCER: ICD-10-CM

## 2019-12-31 PROCEDURE — 99244 OFF/OP CNSLTJ NEW/EST MOD 40: CPT | Performed by: INTERNAL MEDICINE

## 2019-12-31 NOTE — PROGRESS NOTES
[facility] - Outpatient Consultation  Aaliyah Max 63 y o  male MRN: [id number]  Encounter: [id number]          ASSESSMENT AND PLAN:    Aaliyah Max is a 63 y o  male who presents with complaint of heartburn and need for screening  Heartburn had been worse in the past few months and he attributes this to medications that he was placed on  Currently, his symptoms are better off the medications, and on PPI BID  He is also due for a repeat colonoscopy  Available labs and imaging reviewed  1  Heartburn    2  Screening for colon cancer      Orders Placed This Encounter   Procedures    Colonoscopy    EGD     Today we discussed:  -- We will schedule you for an upper endoscopy to evaluate for Alejandre's esophagus given the reflux that you have been experiencing    -- We will also schedule you for a colonoscopy as you are due  -- Okay to reduce the pantoprazole to once a day, to be taken daily, 30 minutes before breakfast every day  -- If you have breakthrough symptoms, okay to use Gaviscon as needed  -- Reflux lifestyle changes that may help include avoidance of trigger foods (potential foods include coffee, caffeine, chocolate, mint, tomato-based products, spicy foods, fatty foods), avoid tight fitting clothing, elevated head of bed 30 degrees, avoid eating 2-3 hours prior to bedtime, weight loss, avoid alcohol, avoid tobacco use  Please be in touch with any questions, concerns or updates  ______________________________________________________________________    HPI:    Aaliyah Max is a 63 y o  male who presents with complaint of heartburn  Starting several years ago he had heartburn, worse in the past year  He was placed on pantoprazole to take as needed  In September he had an echocardiogram and he started to get severe heartburn (he has since stopped the medications)  He was also experiencing pressure on his chest  Since stopping the medications the heartburn went away   He also took the pantoprazole BID  He also feels and smells bile from the inside of his stomach  He is also coughing more now as well, and he feels like something is in his throat  The 3 episodes he had since September were severe and lasted for 40 minutes  That has not come back since stopping the heart medication  No heartburn, dysphagia, odynophagia, nausea, vomiting, diarrhea, constipation, BRBPR, melena, abdominal pain, weight loss  1 prior colonoscopy 8 years ago and it was normal  He was planning to schedule another one but held off     No prior EGD  No clear FH of colon cancer or other GI related issues      REVIEW OF SYSTEMS:  10 point ROS reviewed and negative, except as above      Historical Information   Past Medical History:   Diagnosis Date    BPH (benign prostatic hyperplasia)     Elevated LDL cholesterol level 09/18/2018    GERD (gastroesophageal reflux disease)     Gout     Herpes     simplex - lower lip     Hx of splenomegaly     mild    Hyperuricemia     Idiopathic cardiomyopathy ([facility] [address] )     Parapelvic renal cyst     Sinus bradycardia      Past Surgical History:   Procedure Laterality Date    CARDIAC CATHETERIZATION  80/17/0151    UMBILICAL HERNIA REPAIR      WRIST SURGERY       Social History   Social History     Substance and Sexual Activity   Alcohol Use Yes    Comment: socially     Social History     Substance and Sexual Activity   Drug Use No     Social History     Tobacco Use   Smoking Status Never Smoker   Smokeless Tobacco Never Used     Family History   Problem Relation Age of Onset    Cancer Family     Diabetes Family         mellitus     Heart disease Family     Hypertension Family     Parkinsonism Family     Parkinsonism Mother     Heart attack Father     Kidney disease Father     Diabetes Paternal Grandmother        Meds/Allergies       Current Outpatient Medications:     allopurinol (ZYLOPRIM) 300 mg tablet    cholecalciferol (VITAMIN D3) 1,000 units tablet   LORazepam (ATIVAN) 1 mg tablet    pantoprazole (PROTONIX) 40 mg tablet    Allergies   Allergen Reactions    Other            Objective     Blood pressure 132/72, pulse (!) 54, temperature 98 8 °F (37 1 °C), height 5' 11" (1 803 m), weight 95 5 kg (210 lb 9 6 oz)  Body mass index is 29 37 kg/m²  PHYSICAL EXAM:      General Appearance:   Alert, cooperative, no distress   HEENT:   Normocephalic, atraumatic, anicteric  Neck:  Supple, symmetrical, trachea midline   Lungs:   Clear to auscultation bilaterally; no rales, rhonchi or wheezing; respirations unlabored    Heart[de-identified]   Regular rate and rhythm; no murmur, rub, or gallop  Abdomen:   Umbilical hernia scar  Soft, non-tender, non-distended; normal bowel sounds; no masses, no organomegaly    Genitalia:   Deferred    Rectal:   Deferred    Extremities:  No cyanosis, clubbing or edema    Pulses:  2+ and symmetric    Skin:  No jaundice, rashes, or lesions    Lymph nodes:  No palpable cervical lymphadenopathy        Lab Results:   No visits with results within 1 Day(s) from this visit     Latest known visit with results is:   Appointment on 08/12/2019   Component Date Value    WBC 08/12/2019 5 22     RBC 08/12/2019 4 30     Hemoglobin 08/12/2019 14 3     Hematocrit 08/12/2019 43 2     MCV 08/12/2019 101*    MCH 08/12/2019 33 3     MCHC 08/12/2019 33 1     RDW 08/12/2019 13 2     MPV 08/12/2019 10 4     Platelets 65/94/6731 151     nRBC 08/12/2019 0     Neutrophils Relative 08/12/2019 62     Immat GRANS % 08/12/2019 0     Lymphocytes Relative 08/12/2019 27     Monocytes Relative 08/12/2019 10     Eosinophils Relative 08/12/2019 1     Basophils Relative 08/12/2019 0     Neutrophils Absolute 08/12/2019 3 24     Immature Grans Absolute 08/12/2019 0 02     Lymphocytes Absolute 08/12/2019 1 40     Monocytes Absolute 08/12/2019 0 50     Eosinophils Absolute 08/12/2019 0 04     Basophils Absolute 08/12/2019 0 02     Sodium 08/12/2019 145     Potassium 08/12/2019 4 6     Chloride 08/12/2019 110*    CO2 08/12/2019 29     ANION GAP 08/12/2019 6     BUN 08/12/2019 14     Creatinine 08/12/2019 0 99     Glucose, Fasting 08/12/2019 91     Calcium 08/12/2019 9 1     AST 08/12/2019 8     ALT 08/12/2019 20     Alkaline Phosphatase 08/12/2019 48     Total Protein 08/12/2019 6 8     Albumin 08/12/2019 4 0     Total Bilirubin 08/12/2019 0 62     eGFR 08/12/2019 84     Cholesterol 08/12/2019 163     Triglycerides 08/12/2019 77     HDL, Direct 08/12/2019 56     LDL Calculated 08/12/2019 92     Non-HDL-Chol (CHOL-HDL) 08/12/2019 107     TSH 3RD GENERATON 08/12/2019 2 530     Uric Acid 08/12/2019 4 6     PSA 08/12/2019 0 3          Radiology Results:   No results found

## 2019-12-31 NOTE — PATIENT INSTRUCTIONS
Today we discussed:  -- We will schedule you for an upper endoscopy to evaluate for Alejandre's esophagus given the reflux that you have been experiencing    -- We will also schedule you for a colonoscopy as you are due  -- Okay to reduce the pantoprazole to once a day, to be taken daily, 30 minutes before breakfast every day  -- If you have breakthrough symptoms, okay to use Gaviscon as needed  -- Reflux lifestyle changes that may help include avoidance of trigger foods (potential foods include coffee, caffeine, chocolate, mint, tomato-based products, spicy foods, fatty foods), avoid tight fitting clothing, elevated head of bed 30 degrees, avoid eating 2-3 hours prior to bedtime, weight loss, avoid alcohol, avoid tobacco use  Please be in touch with any questions, concerns or updates  The patient is scheduled at [facility] on 3/25/20 with Dr Janet Lopez For a colonoscopy and EGD  Suprep instructions have been gone over in the office ,with the patient ,by the MA  The patient is aware that they will receive a call with the arrival time the day prior and that they will need a  the day of the procedure  I have asked the patient to call with any questions they may have prior to procedure  The patient is now scheduled on 8/4/20 with Dr Gonzales Said at [facility]  I mailed out Covid screening information and he is aware of the dates to go

## 2020-01-09 ENCOUNTER — OFFICE VISIT (OUTPATIENT)
Dept: FAMILY MEDICINE CLINIC | Facility: CLINIC | Age: 60
End: 2020-01-09
Payer: COMMERCIAL

## 2020-01-09 VITALS
WEIGHT: 209 LBS | SYSTOLIC BLOOD PRESSURE: 104 MMHG | BODY MASS INDEX: 29.26 KG/M2 | TEMPERATURE: 98.5 F | DIASTOLIC BLOOD PRESSURE: 70 MMHG | HEIGHT: 71 IN

## 2020-01-09 DIAGNOSIS — J40 BRONCHITIS: Primary | ICD-10-CM

## 2020-01-09 PROCEDURE — 3008F BODY MASS INDEX DOCD: CPT | Performed by: FAMILY MEDICINE

## 2020-01-09 PROCEDURE — 1036F TOBACCO NON-USER: CPT | Performed by: FAMILY MEDICINE

## 2020-01-09 PROCEDURE — 99213 OFFICE O/P EST LOW 20 MIN: CPT | Performed by: FAMILY MEDICINE

## 2020-01-09 RX ORDER — AZITHROMYCIN 250 MG/1
TABLET, FILM COATED ORAL
Qty: 6 TABLET | Refills: 0 | Status: SHIPPED | OUTPATIENT
Start: 2020-01-09 | End: 2020-01-13

## 2020-01-09 RX ORDER — BENZONATATE 200 MG/1
200 CAPSULE ORAL 3 TIMES DAILY PRN
Qty: 20 CAPSULE | Refills: 0 | Status: SHIPPED | OUTPATIENT
Start: 2020-01-09 | End: 2020-06-15

## 2020-01-09 NOTE — PROGRESS NOTES
Assessment/Plan:       Diagnoses and all orders for this visit:    Bronchitis  -     azithromycin (ZITHROMAX) 250 mg tablet; Take 2 tablets today then 1 tablet daily x 4 days  -     benzonatate (TESSALON) 200 MG capsule; Take 1 capsule (200 mg total) by mouth 3 (three) times a day as needed for cough            Subjective:        Patient ID: Angelica Riojas is a 63 y o  male  Patient started with coughing yesterday after school  Patient has tried DayQuil NyQuil with some relief  No sore throat rhinorrhea sinus pain, fever body aches noted  All other review systems negative  Some mucus production noted  Patient with sick contact wife        The following portions of the patient's history were reviewed and updated as appropriate: allergies, current medications, past family history, past medical history, past social history, past surgical history and problem list       Review of Systems   Constitutional: Negative  HENT: Negative  Eyes: Negative  Respiratory: Positive for cough  Cardiovascular: Negative  Gastrointestinal: Negative  Endocrine: Negative  Genitourinary: Negative  Musculoskeletal: Negative  Skin: Negative  Allergic/Immunologic: Negative  Neurological: Negative  Hematological: Negative  Psychiatric/Behavioral: Negative  Objective:      BMI Counseling: Body mass index is 29 15 kg/m²  The BMI is above normal  Nutrition recommendations include decreasing portion sizes  Exercise recommendations include moderate physical activity 150 minutes/week  /70 (BP Location: Right arm, Patient Position: Sitting, Cuff Size: Adult)   Temp 98 5 °F (36 9 °C) (Tympanic)   Ht 5' 11" (1 803 m)   Wt 94 8 kg (209 lb)   BMI 29 15 kg/m²          Physical Exam   Constitutional: He appears well-developed and well-nourished  No distress  HENT:   Head: Normocephalic     Right Ear: External ear normal    Left Ear: External ear normal    Mouth/Throat: Oropharynx is clear and moist  No oropharyngeal exudate  Eyes: Pupils are equal, round, and reactive to light  EOM are normal  Right eye exhibits no discharge  Left eye exhibits no discharge  No scleral icterus  Neck: Normal range of motion  Neck supple  No thyromegaly present  Cardiovascular: Normal rate, regular rhythm, normal heart sounds and intact distal pulses  Exam reveals no gallop and no friction rub  No murmur heard  Pulmonary/Chest: Effort normal and breath sounds normal  No respiratory distress  He has no wheezes  He has no rales  He exhibits no tenderness  Musculoskeletal: Normal range of motion  He exhibits no edema or tenderness  Lymphadenopathy:     He has no cervical adenopathy  Neurological: He is alert  No cranial nerve deficit  He exhibits normal muscle tone  Coordination normal    Skin: Skin is warm and dry  No rash noted  He is not diaphoretic  No erythema  No pallor  Psychiatric: He has a normal mood and affect  His behavior is normal  Judgment and thought content normal    Nursing note and vitals reviewed

## 2020-01-16 ENCOUNTER — OFFICE VISIT (OUTPATIENT)
Dept: CARDIOLOGY CLINIC | Facility: CLINIC | Age: 60
End: 2020-01-16
Payer: COMMERCIAL

## 2020-01-16 VITALS — HEIGHT: 71 IN | WEIGHT: 207 LBS | BODY MASS INDEX: 28.98 KG/M2

## 2020-01-16 DIAGNOSIS — I42.8 NONISCHEMIC CARDIOMYOPATHY (HCC): Primary | ICD-10-CM

## 2020-01-16 DIAGNOSIS — I49.3 PVC (PREMATURE VENTRICULAR CONTRACTION): ICD-10-CM

## 2020-01-16 PROBLEM — Z86.79 HISTORY OF CARDIOMYOPATHY: Status: ACTIVE | Noted: 2020-01-16

## 2020-01-16 PROCEDURE — 99213 OFFICE O/P EST LOW 20 MIN: CPT | Performed by: INTERNAL MEDICINE

## 2020-01-17 NOTE — PROGRESS NOTES
Cardiology Follow Up Visit    Akhil Chun  1960  [id number]  [address] [facility] [address]  [address]  [phone]    1  Nonischemic cardiomyopathy ([address] )  MRI cardiac  w wo contrast   2  PVC (premature ventricular contraction)  MRI cardiac  w wo contrast         Discussion/Summary:    1  History of low-normal to mildly reduced LVEF current EF 52%, NYHA 1, NICM, prior catheterization normal coronary arteries 2013, inability to tolerate beta-blockers, Ace inhibitors  2  History of asymptomatic PVCs    Plan: Will obtain cardiac MRI for etiology of nonischemic cardiomyopathy  Holter monitor for PVC burden  Interval History:    64-year-old male here for follow-up history of transient nonischemic cardiomyopathy  Patient has had intermittently low-normal to mildly reduced LVEF  Was cared for at [facility]  for number of years  Had cardiac catheterization [facility] August 2013 with normal coronary arteries  Recent echocardiogram here with mildly reduced LVEF at 52 percent  Patient has had PVCs which have been asymptomatic  Holter monitor ordered but not obtained since last visit  No complaints  Was started on ACE-inhibitor and carvedilol but stated had stomach upset from both medications and stopped      Patient Active Problem List   Diagnosis    Chronic lumbar pain    Acid reflux    Esophageal reflux    Low vitamin D level    Gout    URTI (acute upper respiratory infection)    Influenza    Visual disturbance    Shoulder joint pain    Epicondylitis, lateral, right    Primary insomnia    Well adult exam    Precordial pain    Sinus bradycardia    PVC (premature ventricular contraction)    Bronchitis    History of cardiomyopathy    Nonischemic cardiomyopathy (HCC)     Past Medical History:   Diagnosis Date    BPH (benign prostatic hyperplasia)  Elevated LDL cholesterol level 09/18/2018    GERD (gastroesophageal reflux disease)     Gout     Herpes     simplex - lower lip     Hx of splenomegaly     mild    Hyperuricemia     Idiopathic cardiomyopathy (HCC)     Parapelvic renal cyst     Sinus bradycardia      Social History     Socioeconomic History    Marital status: /Civil Union     Spouse name: Not on file    Number of children: Not on file    Years of education: Not on file    Highest education level: Not on file   Occupational History    Not on file   Social Needs    Financial resource strain: Not on file    Food insecurity:     Worry: Not on file     Inability: Not on file    Transportation needs:     Medical: Not on file     Non-medical: Not on file   Tobacco Use    Smoking status: Never Smoker    Smokeless tobacco: Never Used   Substance and Sexual Activity    Alcohol use: Yes     Comment: socially    Drug use: No    Sexual activity: Yes     Partners: Female     Birth control/protection: None   Lifestyle    Physical activity:     Days per week: Not on file     Minutes per session: Not on file    Stress: Not on file   Relationships    Social connections:     Talks on phone: Not on file     Gets together: Not on file     Attends Confucianism service: Not on file     Active member of club or organization: Not on file     Attends meetings of clubs or organizations: Not on file     Relationship status: Not on file    Intimate partner violence:     Fear of current or ex partner: Not on file     Emotionally abused: Not on file     Physically abused: Not on file     Forced sexual activity: Not on file   Other Topics Concern    Not on file   Social History Narrative    Not on file      Family History   Problem Relation Age of Onset    Cancer Family     Diabetes Family         mellitus     Heart disease Family     Hypertension Family     Parkinsonism Family     Parkinsonism Mother     Heart attack Father     Kidney disease Father     Diabetes Paternal Grandmother      Past Surgical History:   Procedure Laterality Date    CARDIAC CATHETERIZATION  31/21/2781    UMBILICAL HERNIA REPAIR      WRIST SURGERY         Current Outpatient Medications:     allopurinol (ZYLOPRIM) 300 mg tablet, TAKE ONE TABLET BY MOUTH EVERY OTHER DAY , Disp: 45 tablet, Rfl: 0    benzonatate (TESSALON) 200 MG capsule, Take 1 capsule (200 mg total) by mouth 3 (three) times a day as needed for cough, Disp: 20 capsule, Rfl: 0    cholecalciferol (VITAMIN D3) 1,000 units tablet, Take by mouth daily , Disp: , Rfl:     LORazepam (ATIVAN) 1 mg tablet, Take 1 tablet (1 mg total) by mouth 2 (two) times a day (Patient taking differently: Take 1 mg by mouth as needed ), Disp: 30 tablet, Rfl: 0    pantoprazole (PROTONIX) 40 mg tablet, Take 1 tablet (40 mg total) by mouth 2 (two) times a day (Patient taking differently: Take 40 mg by mouth daily ), Disp: 180 tablet, Rfl: 1    Na Sulfate-K Sulfate-Mg Sulf (SUPREP BOWEL PREP KIT) 17 5-3 13-1 6 GM/177ML SOLN, Take 1 Dose by mouth once for 1 dose, Disp: 1 Bottle, Rfl: 0  No Known Allergies      Social, Family, Medication history reviewed and updated as necessary      Labs:     Lab Results   Component Value Date     02/11/2015    K 4 6 08/12/2019     (H) 08/12/2019    CO2 29 08/12/2019    BUN 14 08/12/2019    CREATININE 0 99 08/12/2019    CREATININE 1 12 02/11/2015    GLUCOSE 84 02/11/2015    CALCIUM 9 1 08/12/2019       Lab Results   Component Value Date    WBC 5 22 08/12/2019    HGB 14 3 08/12/2019    HGB 14 3 02/11/2015    HCT 43 2 08/12/2019    HCT 41 4 02/11/2015     08/12/2019     02/11/2015       Lab Results   Component Value Date    CHOL 161 02/11/2015    CHOL 179 09/03/2014     Lab Results   Component Value Date    HDL 56 08/12/2019    HDL 48 02/11/2015     Lab Results   Component Value Date    LDLCALC 92 08/12/2019    LDLCALC 81 02/11/2015     No results found for: LDLDIRECT Lab Results   Component Value Date    TRIG 77 08/12/2019    TRIG 162 02/11/2015       Lab Results   Component Value Date    ALT 20 08/12/2019    ALT 24 02/11/2015    AST 8 08/12/2019    AST 13 02/11/2015       No results found for: INR    No results found for: NTBNP    No results found for: HGBA1C        Imaging: Reviewed in epic        Review of Systems:  14 systems reviewed and negative with exception of the above        PHYSICAL EXAM:      There were no vitals filed for this visit  Body mass index is 28 87 kg/m²  Weight (last 2 days)     Date/Time   Weight    01/16/20 1505   93 9 (207)                Gen: No acute distress  HEENT: anicteric, mucous membranes moist  Neck: supple, no jugular venous distention, or carotid bruit  Heart: regular, normal s1 and s2, no murmur/rub or gallop  Lungs :clear to auscultation bilaterally, no rales/rhonchi or wheeze  Abdomen: soft nontender, normoactive bowel sounds, no organomegaly  Ext: warm and perfused, normal femoral pulses, no edema, or clubbing  Skin: warm, no rashes  Neuro: AAO x 3, no focal findings  Psychiatric: normal affect  Musculoskeletal: no obvious joint deformities

## 2020-01-28 ENCOUNTER — HOSPITAL ENCOUNTER (OUTPATIENT)
Dept: NON INVASIVE DIAGNOSTICS | Facility: HOSPITAL | Age: 60
Discharge: HOME | End: 2020-01-28
Payer: COMMERCIAL

## 2020-01-28 DIAGNOSIS — R07.2 PRECORDIAL PAIN: ICD-10-CM

## 2020-01-28 DIAGNOSIS — I49.3 PVC (PREMATURE VENTRICULAR CONTRACTION): ICD-10-CM

## 2020-01-28 PROCEDURE — 93226 XTRNL ECG REC<48 HR SCAN A/R: CPT

## 2020-01-28 PROCEDURE — 93225 XTRNL ECG REC<48 HRS REC: CPT

## 2020-01-29 ENCOUNTER — TELEPHONE (OUTPATIENT)
Dept: CARDIOLOGY CLINIC | Facility: CLINIC | Age: 60
End: 2020-01-29

## 2020-01-29 NOTE — TELEPHONE ENCOUNTER
FYI: Pt wanted you to be aware he had cath scanned into chart from [facility][id number]

## 2020-01-30 DIAGNOSIS — M10.00 IDIOPATHIC GOUT, UNSPECIFIED CHRONICITY, UNSPECIFIED SITE: ICD-10-CM

## 2020-01-30 RX ORDER — ALLOPURINOL 300 MG/1
TABLET ORAL
Qty: 45 TABLET | Refills: 0 | Status: SHIPPED | OUTPATIENT
Start: 2020-01-30 | End: 2020-05-08 | Stop reason: SDUPTHER

## 2020-02-04 PROCEDURE — 93227 XTRNL ECG REC<48 HR R&I: CPT

## 2020-02-18 DIAGNOSIS — I42.9 CARDIOMYOPATHY, UNSPECIFIED TYPE (HCC): Primary | ICD-10-CM

## 2020-02-29 ENCOUNTER — APPOINTMENT (OUTPATIENT)
Dept: LAB | Facility: MEDICAL CENTER | Age: 60
End: 2020-02-29
Payer: COMMERCIAL

## 2020-02-29 DIAGNOSIS — I42.9 CARDIOMYOPATHY, UNSPECIFIED TYPE (HCC): ICD-10-CM

## 2020-02-29 LAB
ANION GAP SERPL CALCULATED.3IONS-SCNC: 5 MMOL/L (ref 4–13)
BUN SERPL-MCNC: 18 MG/DL (ref 5–25)
CALCIUM SERPL-MCNC: 9.1 MG/DL (ref 8.3–10.1)
CHLORIDE SERPL-SCNC: 109 MMOL/L (ref 100–108)
CO2 SERPL-SCNC: 27 MMOL/L (ref 21–32)
CREAT SERPL-MCNC: 1.14 MG/DL (ref 0.6–1.3)
GFR SERPL CREATININE-BSD FRML MDRD: 70 ML/MIN/1.73SQ M
GLUCOSE P FAST SERPL-MCNC: 90 MG/DL (ref 65–99)
POTASSIUM SERPL-SCNC: 4.6 MMOL/L (ref 3.5–5.3)
SODIUM SERPL-SCNC: 141 MMOL/L (ref 136–145)

## 2020-02-29 PROCEDURE — 80048 BASIC METABOLIC PNL TOTAL CA: CPT

## 2020-02-29 PROCEDURE — 36415 COLL VENOUS BLD VENIPUNCTURE: CPT

## 2020-03-02 ENCOUNTER — HOSPITAL ENCOUNTER (OUTPATIENT)
Dept: RADIOLOGY | Facility: HOSPITAL | Age: 60
Discharge: HOME | End: 2020-03-02
Attending: INTERNAL MEDICINE
Payer: COMMERCIAL

## 2020-03-02 DIAGNOSIS — I42.8 NONISCHEMIC CARDIOMYOPATHY (HCC): ICD-10-CM

## 2020-03-02 DIAGNOSIS — I49.3 PVC (PREMATURE VENTRICULAR CONTRACTION): ICD-10-CM

## 2020-03-02 PROCEDURE — 75561 CARDIAC MRI FOR MORPH W/DYE: CPT

## 2020-03-02 PROCEDURE — A9585 GADOBUTROL INJECTION: HCPCS | Performed by: INTERNAL MEDICINE

## 2020-03-02 RX ADMIN — GADOBUTROL 20 ML: 604.72 INJECTION INTRAVENOUS at 10:33

## 2020-03-19 ENCOUNTER — TELEPHONE (OUTPATIENT)
Dept: GASTROENTEROLOGY | Facility: MEDICAL CENTER | Age: 60
End: 2020-03-19

## 2020-03-31 DIAGNOSIS — R07.2 PRECORDIAL PAIN: ICD-10-CM

## 2020-03-31 RX ORDER — LORAZEPAM 1 MG/1
1 TABLET ORAL AS NEEDED
Qty: 30 TABLET | Refills: 0 | Status: SHIPPED | OUTPATIENT
Start: 2020-03-31 | End: 2020-10-26 | Stop reason: SDUPTHER

## 2020-03-31 NOTE — TELEPHONE ENCOUNTER
Patient was calling for a refill on Lorazepam to help with sleep  Pt only takes it as needed  Please review and sign  Last seen 1-14-20 and upcoming appointment 5-18-20

## 2020-05-08 DIAGNOSIS — M10.00 IDIOPATHIC GOUT, UNSPECIFIED CHRONICITY, UNSPECIFIED SITE: ICD-10-CM

## 2020-05-08 RX ORDER — ALLOPURINOL 300 MG/1
300 TABLET ORAL EVERY OTHER DAY
Qty: 45 TABLET | Refills: 0 | Status: SHIPPED | OUTPATIENT
Start: 2020-05-08 | End: 2020-08-25

## 2020-06-15 ENCOUNTER — OFFICE VISIT (OUTPATIENT)
Dept: FAMILY MEDICINE CLINIC | Facility: CLINIC | Age: 60
End: 2020-06-15
Payer: COMMERCIAL

## 2020-06-15 VITALS
DIASTOLIC BLOOD PRESSURE: 72 MMHG | HEIGHT: 71 IN | WEIGHT: 211 LBS | BODY MASS INDEX: 29.54 KG/M2 | SYSTOLIC BLOOD PRESSURE: 116 MMHG | TEMPERATURE: 96.8 F

## 2020-06-15 DIAGNOSIS — K21.00 GASTROESOPHAGEAL REFLUX DISEASE WITH ESOPHAGITIS: Primary | ICD-10-CM

## 2020-06-15 DIAGNOSIS — F51.01 PRIMARY INSOMNIA: ICD-10-CM

## 2020-06-15 DIAGNOSIS — M54.50 LUMBAR PAIN: ICD-10-CM

## 2020-06-15 PROCEDURE — 3008F BODY MASS INDEX DOCD: CPT | Performed by: FAMILY MEDICINE

## 2020-06-15 PROCEDURE — 1036F TOBACCO NON-USER: CPT | Performed by: FAMILY MEDICINE

## 2020-06-15 PROCEDURE — 99214 OFFICE O/P EST MOD 30 MIN: CPT | Performed by: FAMILY MEDICINE

## 2020-06-15 RX ORDER — DULOXETIN HYDROCHLORIDE 30 MG/1
30 CAPSULE, DELAYED RELEASE ORAL DAILY
Qty: 30 CAPSULE | Refills: 2 | Status: SHIPPED | OUTPATIENT
Start: 2020-06-15 | End: 2020-08-31 | Stop reason: SDUPTHER

## 2020-07-28 ENCOUNTER — ANESTHESIA EVENT (OUTPATIENT)
Dept: GASTROENTEROLOGY | Facility: MEDICAL CENTER | Age: 60
End: 2020-07-28

## 2020-07-29 DIAGNOSIS — Z20.822 ENCOUNTER FOR LABORATORY TESTING FOR COVID-19 VIRUS: ICD-10-CM

## 2020-07-29 PROCEDURE — U0003 INFECTIOUS AGENT DETECTION BY NUCLEIC ACID (DNA OR RNA); SEVERE ACUTE RESPIRATORY SYNDROME CORONAVIRUS 2 (SARS-COV-2) (CORONAVIRUS DISEASE [COVID-19]), AMPLIFIED PROBE TECHNIQUE, MAKING USE OF HIGH THROUGHPUT TECHNOLOGIES AS DESCRIBED BY CMS-2020-01-R: HCPCS

## 2020-07-30 LAB — SARS-COV-2 RNA SPEC QL NAA+PROBE: NOT DETECTED

## 2020-08-04 ENCOUNTER — ANESTHESIA (OUTPATIENT)
Dept: GASTROENTEROLOGY | Facility: MEDICAL CENTER | Age: 60
End: 2020-08-04

## 2020-08-04 ENCOUNTER — HOSPITAL ENCOUNTER (OUTPATIENT)
Dept: GASTROENTEROLOGY | Facility: MEDICAL CENTER | Age: 60
Setting detail: OUTPATIENT SURGERY
Discharge: HOME | End: 2020-08-04
Admitting: STUDENT IN AN ORGANIZED HEALTH CARE EDUCATION/TRAINING PROGRAM
Payer: COMMERCIAL

## 2020-08-04 VITALS
HEART RATE: 72 BPM | OXYGEN SATURATION: 97 % | DIASTOLIC BLOOD PRESSURE: 70 MMHG | WEIGHT: 200 LBS | BODY MASS INDEX: 28.63 KG/M2 | HEIGHT: 70 IN | SYSTOLIC BLOOD PRESSURE: 122 MMHG | TEMPERATURE: 99.1 F | RESPIRATION RATE: 20 BRPM

## 2020-08-04 DIAGNOSIS — Z12.11 SCREENING FOR COLON CANCER: ICD-10-CM

## 2020-08-04 DIAGNOSIS — R12 HEARTBURN: ICD-10-CM

## 2020-08-04 PROCEDURE — NC001 PR NO CHARGE: Performed by: INTERNAL MEDICINE

## 2020-08-04 PROCEDURE — 43239 EGD BIOPSY SINGLE/MULTIPLE: CPT | Performed by: INTERNAL MEDICINE

## 2020-08-04 PROCEDURE — G0121 COLON CA SCRN NOT HI RSK IND: HCPCS | Performed by: INTERNAL MEDICINE

## 2020-08-04 PROCEDURE — 88305 TISSUE EXAM BY PATHOLOGIST: CPT | Performed by: PATHOLOGY

## 2020-08-04 RX ORDER — SODIUM CHLORIDE 9 MG/ML
125 INJECTION, SOLUTION INTRAVENOUS CONTINUOUS
Status: DISCONTINUED | OUTPATIENT
Start: 2020-08-04 | End: 2020-08-08 | Stop reason: HOSPADM

## 2020-08-04 RX ORDER — PROPOFOL 10 MG/ML
INJECTION, EMULSION INTRAVENOUS AS NEEDED
Status: DISCONTINUED | OUTPATIENT
Start: 2020-08-04 | End: 2020-08-04

## 2020-08-04 RX ADMIN — PROPOFOL 50 MG: 10 INJECTION, EMULSION INTRAVENOUS at 14:37

## 2020-08-04 RX ADMIN — SODIUM CHLORIDE 125 ML/HR: 0.9 INJECTION, SOLUTION INTRAVENOUS at 14:26

## 2020-08-04 RX ADMIN — PROPOFOL 50 MG: 10 INJECTION, EMULSION INTRAVENOUS at 14:49

## 2020-08-04 RX ADMIN — PROPOFOL 150 MG: 10 INJECTION, EMULSION INTRAVENOUS at 14:36

## 2020-08-04 RX ADMIN — PROPOFOL 100 MG: 10 INJECTION, EMULSION INTRAVENOUS at 14:43

## 2020-08-04 NOTE — ANESTHESIA PREPROCEDURE EVALUATION
Procedure:  COLONOSCOPY  EGD    Relevant Problems   ANESTHESIA (within normal limits)      CARDIO   (+) PVC (premature ventricular contraction)   (+) Precordial pain   (+) Sinus bradycardia      ENDO (within normal limits)      GI/HEPATIC   (+) Acid reflux   (+) Esophageal reflux      /RENAL (within normal limits)      GYN (within normal limits)      HEMATOLOGY (within normal limits)      MUSCULOSKELETAL   (+) Chronic lumbar pain   (+) Epicondylitis, lateral, right   (+) Gout   (+) Lumbar pain      NEURO/PSYCH   (+) History of cardiomyopathy   (+) Visual disturbance      PULMONARY   (+) URTI (acute upper respiratory infection)      Other   (+) Nonischemic cardiomyopathy (HCC)        Physical Exam    Airway    Mallampati score: II  TM Distance: >3 FB  Neck ROM: full     Dental       Cardiovascular  Rhythm: regular,     Pulmonary  Breath sounds clear to auscultation,     Other Findings        Anesthesia Plan  ASA Score- 3     Anesthesia Type- IV sedation with anesthesia with ASA Monitors  Additional Monitors:   Airway Plan:           Plan Factors-Exercise tolerance (METS): >4 METS  Chart reviewed  Patient is not a current smoker  Patient not instructed to abstain from smoking on day of procedure  Patient did not smoke on day of surgery  Induction- intravenous  Postoperative Plan-     Informed Consent- Anesthetic plan and risks discussed with patient

## 2020-08-04 NOTE — ANESTHESIA POSTPROCEDURE EVALUATION
Post-Op Assessment Note    CV Status:  Stable  Pain Score: 0    Pain management: adequate     Mental Status:  Alert and awake   Hydration Status:  Euvolemic and stable   PONV Controlled:  Controlled   Airway Patency:  Patent      Post Op Vitals Reviewed: Yes      Staff: Anesthesiologist         No complications documented      /70 (08/04/20 1510)    Temp      Pulse 72 (08/04/20 1510)   Resp 20 (08/04/20 1510)    SpO2 97 % (08/04/20 1510)

## 2020-08-04 NOTE — DISCHARGE INSTRUCTIONS
Colonoscopy   WHAT YOU NEED TO KNOW:   A colonoscopy is a procedure to examine the inside of your colon (intestine) with a scope  Polyps or tissue growths may have been removed during your colonoscopy  It is normal to feel bloated and to have some abdominal discomfort  You should be passing gas  If you have hemorrhoids or you had polyps removed, you may have a small amount of bleeding  DISCHARGE INSTRUCTIONS:   Seek care immediately if:   · You have a large amount of bright red blood in your bowel movements  · Your abdomen is hard and firm and you have severe pain  · You have sudden trouble breathing  Contact your healthcare provider if:   · You develop a rash or hives  · You have a fever within 24 hours of your procedure  · You have not had a bowel movement for 3 days after your procedure  · You have questions or concerns about your condition or care  Activity:   · Do not lift, strain, or run  for 3 days after your procedure  · Rest after your procedure  You have been given medicine to relax you  Do not  drive or make important decisions until the day after your procedure  Return to your normal activity as directed  · Relieve gas and discomfort from bloating  by lying on your right side with a heating pad on your abdomen  You may need to take short walks to help the gas move out  Eat small meals until bloating is relieved  If you had polyps removed: For 7 days after your procedure:  · Do not  take aspirin  · Do not  go on long car rides  Help prevent constipation:   · Eat a variety of healthy foods  Healthy foods include fruit, vegetables, whole-grain breads, low-fat dairy products, beans, lean meat, and fish  Ask if you need to be on a special diet  Your healthcare provider may recommend that you eat high-fiber foods such as cooked beans  Fiber helps you have regular bowel movements  · Drink liquids as directed    Adults should drink between 9 and 13 eight-ounce cups of liquid every day  Ask what amount is best for you  For most people, good liquids to drink are water, juice, and milk  · Exercise as directed  Talk to your healthcare provider about the best exercise plan for you  Exercise can help prevent constipation, decrease your blood pressure and improve your health  Follow up with your healthcare provider as directed:  Write down your questions so you remember to ask them during your visits  © 2017 [address] [employer] Information is for End User's use only and may not be sold, redistributed or otherwise used for commercial purposes  All illustrations and images included in CareNotes® are the copyrighted property of A D A M , Inc  or [employer]ad[employer]don[employer]  The above information is an  only  It is not intended as medical advice for individual conditions or treatments  Talk to your doctor, nurse or pharmacist before following any medical regimen to see if it is safe and effective for you  Upper Endoscopy   WHAT YOU NEED TO KNOW:   An upper endoscopy is also called an upper gastrointestinal (GI) endoscopy, or an esophagogastroduodenoscopy (EGD)  You may feel bloated, gassy, or have some abdominal discomfort after your procedure  Your throat may be sore for 24 to 36 hours  You may burp or pass gas from air that is still inside your body  DISCHARGE INSTRUCTIONS:   Call 911 if:   · You have sudden chest pain or trouble breathing  Seek care immediately if:   · You feel dizzy or faint  · You have trouble swallowing  · You have severe throat pain  · Your bowel movements are very dark or black  · Your abdomen is hard and firm and you have severe pain  · You vomit blood  Contact your healthcare provider if:   · You feel full or bloated and cannot burp or pass gas  · You have not had a bowel movement for 3 days after your procedure  · You have neck pain  · You have a fever or chills      · You have nausea or are vomiting  · You have a rash or hives  · You have questions or concerns about your endoscopy  Relieve a sore throat:  Suck on throat lozenges or crushed ice  Gargle with a small amount of warm salt water  Mix 1 teaspoon of salt and 1 cup of warm water to make salt water  Relieve gas and discomfort from bloating:  Lie on your right side with a heating pad on your abdomen  Take short walks to help pass gas  Eat small meals until bloating is relieved  Rest after your procedure:  Do not drive or make important decisions until the day after your procedure  Return to your normal activity as directed  You can usually return to work the day after your procedure  Follow up with your healthcare provider as directed:  Write down your questions so you remember to ask them during your visits  © 2017 [employer] Information is for End User's use only and may not be sold, redistributed or otherwise used for commercial purposes  All illustrations and images included in CareNotes® are the copyrighted property of A D A M [employer] Inc  or Mamadou Maldonado  The above information is an  only  It is not intended as medical advice for individual conditions or treatments  Talk to your doctor, nurse or pharmacist before following any medical regimen to see if it is safe and effective for you

## 2020-08-04 NOTE — H&P
History and Physical -  [facility]  Timothy Hinkle 62 y o  male MRN: [id number]                  HPI: Timothy Hinkle is a 62y o  year old male who presents for heartburn, screening for CRC  REVIEW OF SYSTEMS: Per the HPI, and otherwise unremarkable  Historical Information   Past Medical History:   Diagnosis Date    Anxiety     BPH (benign prostatic hyperplasia)     Elevated LDL cholesterol level 09/18/2018    GERD (gastroesophageal reflux disease)     Gout     Herpes     simplex - lower lip     Hx of splenomegaly     mild    Hyperuricemia     Idiopathic cardiomyopathy (HCC)     Parapelvic renal cyst     Sinus bradycardia      Past Surgical History:   Procedure Laterality Date    CARDIAC CATHETERIZATION  08/30/2013    COLONOSCOPY      ROTATOR CUFF REPAIR Right     UMBILICAL HERNIA REPAIR      WRIST SURGERY Left      Social History   Social History     Substance and Sexual Activity   Alcohol Use Yes    Comment: socially     Social History     Substance and Sexual Activity   Drug Use No     Social History     Tobacco Use   Smoking Status Never Smoker   Smokeless Tobacco Never Used     Family History   Problem Relation Age of Onset    Cancer Family     Diabetes Family         mellitus     Heart disease Family     Hypertension Family     Parkinsonism Family     Parkinsonism Mother     Heart attack Father     Kidney disease Father     Diabetes Paternal Grandmother     Arthritis Maternal Grandmother        Meds/Allergies     (Not in a hospital admission)      No Known Allergies    Objective     Blood pressure 142/77, pulse 71, temperature 99 1 °F (37 3 °C), temperature source Temporal, resp  rate 16, height 5' 10", weight 90 7 kg (200 lb), SpO2 96 %  PHYSICAL EXAMINATION:    General Appearance:   Alert, cooperative, no distress   HEENT:  Normocephalic, atraumatic, anicteric  Neck supple, symmetrical, trachea midline     Lungs:   Equal chest rise and unlabored breathing, normal effort, no coughing  Cardiovascular:   No visualized JVD  Abdomen:   No abdominal distension  Skin:   No jaundice, rashes, or lesions  Musculoskeletal:   Normal range of motion visualized  Psych:  Normal affect and normal insight  Neuro:  Alert and appropriate  ASSESSMENT/PLAN:  This is a 62y o  year old male here for EGD and colonoscopy, and he is stable and optimized for his procedure

## 2020-08-20 ENCOUNTER — OFFICE VISIT (OUTPATIENT)
Dept: CARDIOLOGY CLINIC | Facility: CLINIC | Age: 60
End: 2020-08-20
Payer: COMMERCIAL

## 2020-08-20 VITALS
HEART RATE: 68 BPM | HEIGHT: 70 IN | SYSTOLIC BLOOD PRESSURE: 106 MMHG | WEIGHT: 207 LBS | DIASTOLIC BLOOD PRESSURE: 68 MMHG | TEMPERATURE: 96.8 F | BODY MASS INDEX: 29.63 KG/M2

## 2020-08-20 DIAGNOSIS — I42.8 NONISCHEMIC CARDIOMYOPATHY (HCC): ICD-10-CM

## 2020-08-20 DIAGNOSIS — I49.3 PVC (PREMATURE VENTRICULAR CONTRACTION): Primary | ICD-10-CM

## 2020-08-20 PROCEDURE — 1036F TOBACCO NON-USER: CPT | Performed by: INTERNAL MEDICINE

## 2020-08-20 PROCEDURE — 99213 OFFICE O/P EST LOW 20 MIN: CPT | Performed by: INTERNAL MEDICINE

## 2020-08-20 NOTE — PROGRESS NOTES
Cardiology Follow Up Visit    Cindi Patterson  1960  [id number]  [address] [id number]  [phone]    2  PVC (premature ventricular contraction)  Echo complete with contrast if indicated    Holter monitor - 24 hour   2  Nonischemic cardiomyopathy (HCC)  Echo complete with contrast if indicated    Holter monitor - 24 hour         Discussion/Summary:    1  Nonischemic cardiomyopathy, LVEF 48-52%, cardiac MRI with idiopathic cardiomyopathy, catheterization earlier [facility] 2013 no coronary disease    2  PVCs, asymptomatic    Plan:  Patient continues to do well  Had side effects from beta blockers and Ace inhibitors and prefers not to use  LVEF mildly reduced  No prior volume overload  Follow-up echo and Holter monitor regarding cardiomyopathy and PVCs  Interval History:    Patient is a 71-year-old male history of nonischemic cardiomyopathy  LVEF has ranged between 48 low 50% range  Had an echocardiogram performed last year EF 52%  LVEF 48% by cardiac MRI  There is no evidence of scarring, inflammation or infiltrative disease  Also with a history of asymptomatic PVCs, 48 hour Holter performed last year with 3% PVC burden  Asymptomatic  He was started on cardiomyopathic medications including cardiomyopathic specific beta-blocker and ACE-inhibitor  Stopped ACE-inhibitor and carvedilol as stated upset his stomach  He did not feel well        Patient Active Problem List   Diagnosis    Chronic lumbar pain    Acid reflux    Esophageal reflux    Low vitamin D level    Gout    URTI (acute upper respiratory infection)    Influenza    Visual disturbance    Shoulder joint pain    Epicondylitis, lateral, right    Primary insomnia    Well adult exam    Precordial pain    Sinus bradycardia    PVC (premature ventricular contraction)    Bronchitis    History of cardiomyopathy    Nonischemic cardiomyopathy ([facility] [address] )    Lumbar pain     Past Medical History:   Diagnosis Date    Anxiety     BPH (benign prostatic hyperplasia)     Elevated LDL cholesterol level 09/18/2018    GERD (gastroesophageal reflux disease)     Gout     Herpes     simplex - lower lip     Hx of splenomegaly     mild    Hyperuricemia     Idiopathic cardiomyopathy (HCC)     Parapelvic renal cyst     Sinus bradycardia      Social History     Socioeconomic History    Marital status: /Civil Union     Spouse name: Not on file    Number of children: Not on file    Years of education: Not on file    Highest education level: Not on file   Occupational History    Not on file   Social Needs    Financial resource strain: Not on file    Food insecurity     Worry: Not on file     Inability: Not on file   Zinkia needs     Medical: Not on file     Non-medical: Not on file   Tobacco Use    Smoking status: Never Smoker    Smokeless tobacco: Never Used   Substance and Sexual Activity    Alcohol use: Yes     Comment: socially    Drug use: No    Sexual activity: Yes     Partners: Female     Birth control/protection: None   Lifestyle    Physical activity     Days per week: Not on file     Minutes per session: Not on file    Stress: Not on file   Relationships    Social connections     Talks on phone: Not on file     Gets together: Not on file     Attends Cheondoism service: Not on file     Active member of club or organization: Not on file     Attends meetings of clubs or organizations: Not on file     Relationship status: Not on file    Intimate partner violence     Fear of current or ex partner: Not on file     Emotionally abused: Not on file     Physically abused: Not on file     Forced sexual activity: Not on file   Other Topics Concern    Not on file   Social History Narrative    Not on file      Family History   Problem Relation Age of Onset    Cancer Family     Diabetes Family         mellitus     Heart disease Family     Hypertension Family     Parkinsonism Family     Parkinsonism Mother     Heart attack Father     Kidney disease Father     Diabetes Paternal Grandmother     Arthritis Maternal Grandmother      Past Surgical History:   Procedure Laterality Date    CARDIAC CATHETERIZATION  08/30/2013    COLONOSCOPY      ROTATOR CUFF REPAIR Right     UMBILICAL HERNIA REPAIR      WRIST SURGERY Left        Current Outpatient Medications:     allopurinol (ZYLOPRIM) 300 mg tablet, Take 1 tablet (300 mg total) by mouth every other day, Disp: 45 tablet, Rfl: 0    cholecalciferol (VITAMIN D3) 1,000 units tablet, Take by mouth daily , Disp: , Rfl:     DULoxetine (CYMBALTA) 30 mg delayed release capsule, Take 1 capsule (30 mg total) by mouth daily, Disp: 30 capsule, Rfl: 2    LORazepam (ATIVAN) 1 mg tablet, Take 1 tablet (1 mg total) by mouth as needed (as needed), Disp: 30 tablet, Rfl: 0    pantoprazole (PROTONIX) 40 mg tablet, Take 1 tablet (40 mg total) by mouth 2 (two) times a day (Patient taking differently: Take 40 mg by mouth daily ), Disp: 180 tablet, Rfl: 1  No Known Allergies      Social, Family, Medication history reviewed and updated as necessary      Labs:     Lab Results   Component Value Date     02/11/2015    K 4 6 02/29/2020     (H) 02/29/2020    CO2 27 02/29/2020    BUN 18 02/29/2020    CREATININE 1 14 02/29/2020    CREATININE 0 99 08/12/2019    GLUCOSE 84 02/11/2015    CALCIUM 9 1 02/29/2020       Lab Results   Component Value Date    WBC 5 22 08/12/2019    HGB 14 3 08/12/2019    HGB 14 3 02/11/2015    HCT 43 2 08/12/2019    HCT 41 4 02/11/2015     08/12/2019     02/11/2015       Lab Results   Component Value Date    CHOL 161 02/11/2015    CHOL 179 09/03/2014     Lab Results   Component Value Date    HDL 56 08/12/2019    HDL 48 02/11/2015     Lab Results   Component Value Date    LDLCALC 92 08/12/2019    [address] 02/11/2015     No results found for: LDLDIRECT Lab Results   Component Value Date    TRIG 77 08/12/2019    TRIG 162 02/11/2015       Lab Results   Component Value Date    ALT 20 08/12/2019    ALT 24 02/11/2015    AST 8 08/12/2019    AST 13 02/11/2015       No results found for: INR    No results found for: NTBNP    No results found for: HGBA1C        Imaging: Reviewed in epic        Review of Systems:  14 systems reviewed and negative with exception of the above        PHYSICAL EXAM:      Vitals:    08/20/20 1410   BP: 106/68   Pulse: 68   Temp: (!) 96 8 °F (36 °C)     Body mass index is 29 7 kg/m²  Weight (last 2 days)     None            Gen: No acute distress  HEENT: anicteric, mucous membranes moist  Neck: supple, no jugular venous distention, or carotid bruit  Heart: regular, normal s1 and s2, no murmur/rub or gallop  Lungs :clear to auscultation bilaterally, no rales/rhonchi or wheeze  Abdomen: soft nontender, normoactive bowel sounds, no organomegaly  Ext: warm and perfused, normal femoral pulses, no edema, or clubbing  Skin: warm, no rashes  Neuro: AAO x 3, no focal findings  Psychiatric: normal affect  Musculoskeletal: no obvious joint deformities

## 2020-08-25 DIAGNOSIS — M10.00 IDIOPATHIC GOUT, UNSPECIFIED CHRONICITY, UNSPECIFIED SITE: ICD-10-CM

## 2020-08-25 RX ORDER — ALLOPURINOL 300 MG/1
TABLET ORAL
Qty: 45 TABLET | Refills: 0 | Status: SHIPPED | OUTPATIENT
Start: 2020-08-25 | End: 2020-12-10

## 2020-08-31 ENCOUNTER — OFFICE VISIT (OUTPATIENT)
Dept: FAMILY MEDICINE CLINIC | Facility: CLINIC | Age: 60
End: 2020-08-31
Payer: COMMERCIAL

## 2020-08-31 VITALS
SYSTOLIC BLOOD PRESSURE: 120 MMHG | WEIGHT: 209.6 LBS | BODY MASS INDEX: 30.01 KG/M2 | DIASTOLIC BLOOD PRESSURE: 78 MMHG | TEMPERATURE: 96.1 F | HEIGHT: 70 IN

## 2020-08-31 DIAGNOSIS — R07.2 PRECORDIAL PAIN: ICD-10-CM

## 2020-08-31 DIAGNOSIS — I49.3 PVC (PREMATURE VENTRICULAR CONTRACTION): ICD-10-CM

## 2020-08-31 DIAGNOSIS — F51.01 PRIMARY INSOMNIA: ICD-10-CM

## 2020-08-31 DIAGNOSIS — K21.00 GASTROESOPHAGEAL REFLUX DISEASE WITH ESOPHAGITIS: Primary | ICD-10-CM

## 2020-08-31 DIAGNOSIS — M54.50 LUMBAR PAIN: ICD-10-CM

## 2020-08-31 PROCEDURE — 99214 OFFICE O/P EST MOD 30 MIN: CPT | Performed by: FAMILY MEDICINE

## 2020-08-31 PROCEDURE — 3008F BODY MASS INDEX DOCD: CPT | Performed by: FAMILY MEDICINE

## 2020-08-31 PROCEDURE — 1036F TOBACCO NON-USER: CPT | Performed by: FAMILY MEDICINE

## 2020-08-31 PROCEDURE — 3725F SCREEN DEPRESSION PERFORMED: CPT | Performed by: FAMILY MEDICINE

## 2020-08-31 RX ORDER — DULOXETIN HYDROCHLORIDE 60 MG/1
60 CAPSULE, DELAYED RELEASE ORAL DAILY
Qty: 90 CAPSULE | Refills: 1 | Status: SHIPPED | OUTPATIENT
Start: 2020-08-31 | End: 2020-10-26

## 2020-08-31 NOTE — PROGRESS NOTES
Assessment/Plan: the EKG done at this time  Sinus bradycardia with no acute changes noted  Patient have Cymbalta increased to 60 mg daily  Patient continue with Protonix for GERD  Patient use lorazepam sparingly at night for insomnia  Patient will follow with cardiology appropriately  Patient will be scheduled for Holter and echo next July  Follow-up in 6-8 weeks       Diagnoses and all orders for this visit:    Gastroesophageal reflux disease with esophagitis    Primary insomnia    Lumbar pain  -     DULoxetine (CYMBALTA) 60 mg delayed release capsule; Take 1 capsule (60 mg total) by mouth daily    PVC (premature ventricular contraction)            Subjective:        Patient ID: Stephanie Tanner is a 62 y o  male  Patient follow-up on GERD the  Patient did the EGD and colonoscopy  The patient does see cardiology  Patient has notice some atrial flutter  Patient with some anxiety and insomnia  Patient will be getting echo and Holter in July of next year  The following portions of the patient's history were reviewed and updated as appropriate: allergies, current medications, past family history, past medical history, past social history, past surgical history and problem list       Review of Systems   Cardiovascular: Positive for palpitations  Psychiatric/Behavioral: The patient is nervous/anxious  Objective:               /78 (BP Location: Right arm, Patient Position: Sitting, Cuff Size: Standard)   Temp (!) 96 1 °F (35 6 °C) (Tympanic)   Ht 5' 10" (1 778 m)   Wt 95 1 kg (209 lb 9 6 oz)   BMI 30 07 kg/m²          Physical Exam  Vitals signs and nursing note reviewed  Constitutional:       General: He is not in acute distress  Appearance: Normal appearance  He is well-developed  He is not diaphoretic  HENT:      Head: Normocephalic and atraumatic        Right Ear: Tympanic membrane, ear canal and external ear normal       Left Ear: Tympanic membrane, ear canal and external ear normal       Mouth/Throat:      Pharynx: No oropharyngeal exudate  Eyes:      General: No scleral icterus  Right eye: No discharge  Left eye: No discharge  Pupils: Pupils are equal, round, and reactive to light  Neck:      Musculoskeletal: Normal range of motion and neck supple  Thyroid: No thyromegaly  Cardiovascular:      Rate and Rhythm: Normal rate and regular rhythm  Heart sounds: Normal heart sounds  No murmur  No friction rub  No gallop  Comments: Ectopy noted  Pulmonary:      Effort: Pulmonary effort is normal  No respiratory distress  Breath sounds: Normal breath sounds  No wheezing or rales  Chest:      Chest wall: No tenderness  Abdominal:      General: Bowel sounds are normal  There is no distension  Palpations: Abdomen is soft  Tenderness: There is no abdominal tenderness  There is no guarding or rebound  Musculoskeletal: Normal range of motion  General: No tenderness  Lymphadenopathy:      Cervical: No cervical adenopathy  Skin:     General: Skin is warm and dry  Coloration: Skin is not pale  Findings: No erythema or rash  Neurological:      General: No focal deficit present  Mental Status: He is alert and oriented to person, place, and time  Cranial Nerves: No cranial nerve deficit  Motor: No abnormal muscle tone  Coordination: Coordination normal    Psychiatric:         Behavior: Behavior normal          Thought Content:  Thought content normal          Judgment: Judgment normal       Comments: Anxious

## 2020-10-26 ENCOUNTER — OFFICE VISIT (OUTPATIENT)
Dept: FAMILY MEDICINE CLINIC | Facility: CLINIC | Age: 60
End: 2020-10-26
Payer: COMMERCIAL

## 2020-10-26 VITALS
HEIGHT: 70 IN | WEIGHT: 215 LBS | TEMPERATURE: 97.4 F | BODY MASS INDEX: 30.78 KG/M2 | DIASTOLIC BLOOD PRESSURE: 74 MMHG | SYSTOLIC BLOOD PRESSURE: 132 MMHG

## 2020-10-26 DIAGNOSIS — R07.2 PRECORDIAL PAIN: ICD-10-CM

## 2020-10-26 DIAGNOSIS — Z23 NEED FOR IMMUNIZATION AGAINST INFLUENZA: Primary | ICD-10-CM

## 2020-10-26 DIAGNOSIS — F51.01 PRIMARY INSOMNIA: ICD-10-CM

## 2020-10-26 PROCEDURE — 90682 RIV4 VACC RECOMBINANT DNA IM: CPT

## 2020-10-26 PROCEDURE — 90471 IMMUNIZATION ADMIN: CPT

## 2020-10-26 PROCEDURE — 99213 OFFICE O/P EST LOW 20 MIN: CPT | Performed by: FAMILY MEDICINE

## 2020-10-26 PROCEDURE — 3008F BODY MASS INDEX DOCD: CPT | Performed by: FAMILY MEDICINE

## 2020-10-26 RX ORDER — LORAZEPAM 1 MG/1
1 TABLET ORAL AS NEEDED
Qty: 30 TABLET | Refills: 0 | Status: SHIPPED | OUTPATIENT
Start: 2020-10-26 | End: 2021-06-16 | Stop reason: SDUPTHER

## 2020-11-14 DIAGNOSIS — K21.00 GASTROESOPHAGEAL REFLUX DISEASE WITH ESOPHAGITIS: ICD-10-CM

## 2020-11-15 RX ORDER — PANTOPRAZOLE SODIUM 40 MG/1
TABLET, DELAYED RELEASE ORAL
Qty: 180 TABLET | Refills: 0 | Status: SHIPPED | OUTPATIENT
Start: 2020-11-15 | End: 2021-09-13

## 2020-12-10 DIAGNOSIS — M10.00 IDIOPATHIC GOUT, UNSPECIFIED CHRONICITY, UNSPECIFIED SITE: ICD-10-CM

## 2020-12-10 RX ORDER — ALLOPURINOL 300 MG/1
TABLET ORAL
Qty: 45 TABLET | Refills: 0 | Status: SHIPPED | OUTPATIENT
Start: 2020-12-10 | End: 2021-03-16

## 2021-01-11 ENCOUNTER — OFFICE VISIT (OUTPATIENT)
Dept: FAMILY MEDICINE CLINIC | Facility: CLINIC | Age: 61
End: 2021-01-11
Payer: COMMERCIAL

## 2021-01-11 VITALS
HEIGHT: 70 IN | WEIGHT: 213.6 LBS | DIASTOLIC BLOOD PRESSURE: 62 MMHG | TEMPERATURE: 98.2 F | SYSTOLIC BLOOD PRESSURE: 100 MMHG | BODY MASS INDEX: 30.58 KG/M2

## 2021-01-11 DIAGNOSIS — F41.9 ANXIETY: ICD-10-CM

## 2021-01-11 DIAGNOSIS — F51.01 PRIMARY INSOMNIA: Primary | ICD-10-CM

## 2021-01-11 PROCEDURE — 1036F TOBACCO NON-USER: CPT | Performed by: FAMILY MEDICINE

## 2021-01-11 PROCEDURE — 99213 OFFICE O/P EST LOW 20 MIN: CPT | Performed by: FAMILY MEDICINE

## 2021-01-11 PROCEDURE — 3008F BODY MASS INDEX DOCD: CPT | Performed by: FAMILY MEDICINE

## 2021-01-11 NOTE — PROGRESS NOTES
Assessment/Plan:  Patient use Ativan nightly  To consider counseling and being out of work temporarily  Patient will need to take leave absence after February the    Follow-up in roughly 4-6 weeks       Diagnoses and all orders for this visit:    Primary insomnia    Anxiety            Subjective:        Patient ID: Jana Flores is a 62 y o  male  Patient here due to anxiety  Patient has had significant changes life  Patient now has mother long living with them  Patient also had wife's sister passed away recently  Patient's dog also   Patient also with brother-in-law who was in the hospital with [facility]  Patient with difficulty sleeping  Patient is using melatonin  Patient use Ativan with good results  The following portions of the patient's history were reviewed and updated as appropriate: allergies, current medications, past family history, past medical history, past social history, past surgical history and problem list       Review of Systems   Constitutional: Negative  HENT: Negative  Eyes: Negative  Respiratory: Negative  Cardiovascular: Negative  Gastrointestinal: Negative  Endocrine: Negative  Genitourinary: Negative  Musculoskeletal: Negative  Skin: Negative  Allergic/Immunologic: Negative  Neurological: Negative  Hematological: Negative  Psychiatric/Behavioral: The patient is nervous/anxious  Objective:      BMI Counseling: Body mass index is 30 65 kg/m²  The BMI is above normal  Nutrition recommendations include decreasing portion sizes  Exercise recommendations include moderate physical activity 150 minutes/week                 /62 (BP Location: Left arm, Patient Position: Sitting, Cuff Size: Standard)   Temp 98 2 °F (36 8 °C) (Tympanic)   Ht 5' 10" (1 778 m)   Wt 96 9 kg (213 lb 9 6 oz)   BMI 30 65 kg/m²          Physical Exam

## 2021-01-11 NOTE — LETTER
January 11, 2021      To Whom it may concern:  Re: Bridget Watson      Pt will be have a re-evaluation with me on the 24th of February and it will be determined if he can return to work the 25th of February  Please contact me if you have any questions  Thank you        Sincerely,          Ricky Wood DO

## 2021-02-24 ENCOUNTER — OFFICE VISIT (OUTPATIENT)
Dept: FAMILY MEDICINE CLINIC | Facility: CLINIC | Age: 61
End: 2021-02-24
Payer: COMMERCIAL

## 2021-02-24 VITALS
SYSTOLIC BLOOD PRESSURE: 128 MMHG | BODY MASS INDEX: 30.72 KG/M2 | WEIGHT: 214.6 LBS | DIASTOLIC BLOOD PRESSURE: 68 MMHG | TEMPERATURE: 96.7 F | HEIGHT: 70 IN

## 2021-02-24 DIAGNOSIS — F51.01 PRIMARY INSOMNIA: Primary | ICD-10-CM

## 2021-02-24 DIAGNOSIS — F41.9 ANXIETY: ICD-10-CM

## 2021-02-24 PROCEDURE — 3008F BODY MASS INDEX DOCD: CPT | Performed by: FAMILY MEDICINE

## 2021-02-24 PROCEDURE — 1036F TOBACCO NON-USER: CPT | Performed by: FAMILY MEDICINE

## 2021-02-24 PROCEDURE — 99213 OFFICE O/P EST LOW 20 MIN: CPT | Performed by: FAMILY MEDICINE

## 2021-02-24 RX ORDER — ESCITALOPRAM OXALATE 5 MG/1
5 TABLET ORAL DAILY
Qty: 30 TABLET | Refills: 2 | Status: SHIPPED | OUTPATIENT
Start: 2021-02-24 | End: 2021-04-14 | Stop reason: SDUPTHER

## 2021-02-24 NOTE — PROGRESS NOTES
Assessment/Plan: patient will start Lexapro daily as directed  Patient use lorazepam as needed  Guidance given overall  Patient will stay out of work until seen  Follow-up in 6 weeks       Diagnoses and all orders for this visit:    Primary insomnia  -     escitalopram (LEXAPRO) 5 mg tablet; Take 1 tablet (5 mg total) by mouth daily    Anxiety  -     escitalopram (LEXAPRO) 5 mg tablet; Take 1 tablet (5 mg total) by mouth daily            Subjective:        Patient ID: Jordin Alejandro is a 62 y o  male  Patient follow-up on insomnia and anxiety  Patient with new dog  Patient does notice left-sided chest pain with lying on left side  No exertional chest pain shortness of breath  Patient has been out of work  Patient has been more anxious  Patient with insomnia        The following portions of the patient's history were reviewed and updated as appropriate: allergies, current medications, past family history, past medical history, past social history, past surgical history and problem list       Review of Systems   Constitutional: Negative  HENT: Negative  Eyes: Negative  Respiratory: Negative  Cardiovascular: Negative  Gastrointestinal: Negative  Endocrine: Negative  Genitourinary: Negative  Musculoskeletal: Negative  Skin: Negative  Allergic/Immunologic: Negative  Neurological: Negative  Hematological: Negative  Psychiatric/Behavioral: The patient is nervous/anxious  Objective:      BMI Counseling: Body mass index is 30 79 kg/m²  The BMI is above normal  Nutrition recommendations include decreasing portion sizes  Exercise recommendations include moderate physical activity 150 minutes/week  Depression Screening and Follow-up Plan: Clincally patient does not have depression  No treatment is required               /68 (BP Location: Right arm, Patient Position: Sitting, Cuff Size: Standard)   Temp (!) 96 7 °F (35 9 °C) (Tympanic)   Ht 5' 10" (1 568 m)   Wt 97 3 kg (214 lb 9 6 oz)   BMI 30 79 kg/m²          Physical Exam  Vitals signs and nursing note reviewed  Constitutional:       General: He is not in acute distress  Appearance: Normal appearance  He is not ill-appearing, toxic-appearing or diaphoretic  HENT:      Head: Normocephalic and atraumatic  Right Ear: Tympanic membrane, ear canal and external ear normal  There is no impacted cerumen  Left Ear: Tympanic membrane, ear canal and external ear normal  There is no impacted cerumen  Nose: Nose normal  No congestion or rhinorrhea  Mouth/Throat:      Mouth: Mucous membranes are moist       Pharynx: No oropharyngeal exudate or posterior oropharyngeal erythema  Eyes:      General: No scleral icterus  Right eye: No discharge  Left eye: No discharge  Extraocular Movements: Extraocular movements intact  Conjunctiva/sclera: Conjunctivae normal       Pupils: Pupils are equal, round, and reactive to light  Neck:      Musculoskeletal: Normal range of motion and neck supple  No neck rigidity or muscular tenderness  Vascular: No carotid bruit  Cardiovascular:      Rate and Rhythm: Normal rate and regular rhythm  Pulses: Normal pulses  Heart sounds: Normal heart sounds  No murmur  No friction rub  No gallop  Pulmonary:      Effort: Pulmonary effort is normal  No respiratory distress  Breath sounds: Normal breath sounds  No stridor  No wheezing, rhonchi or rales  Chest:      Chest wall: No tenderness  Abdominal:      General: Abdomen is flat  Bowel sounds are normal  There is no distension  Palpations: Abdomen is soft  Tenderness: There is no abdominal tenderness  There is no guarding or rebound  Musculoskeletal: Normal range of motion  General: No swelling, tenderness, deformity or signs of injury  Right lower leg: No edema  Left lower leg: No edema     Lymphadenopathy:      Cervical: No cervical adenopathy  Skin:     General: Skin is warm and dry  Capillary Refill: Capillary refill takes less than 2 seconds  Coloration: Skin is not jaundiced  Findings: No bruising, erythema, lesion or rash  Neurological:      General: No focal deficit present  Mental Status: He is alert and oriented to person, place, and time  Cranial Nerves: No cranial nerve deficit  Sensory: No sensory deficit  Motor: No weakness  Coordination: Coordination normal       Gait: Gait normal    Psychiatric:         Behavior: Behavior normal          Thought Content:  Thought content normal          Judgment: Judgment normal       Comments: Anxious

## 2021-03-16 DIAGNOSIS — M10.00 IDIOPATHIC GOUT, UNSPECIFIED CHRONICITY, UNSPECIFIED SITE: ICD-10-CM

## 2021-03-16 RX ORDER — ALLOPURINOL 300 MG/1
TABLET ORAL
Qty: 45 TABLET | Refills: 0 | Status: SHIPPED | OUTPATIENT
Start: 2021-03-16 | End: 2021-06-09

## 2021-04-14 ENCOUNTER — OFFICE VISIT (OUTPATIENT)
Dept: FAMILY MEDICINE CLINIC | Facility: CLINIC | Age: 61
End: 2021-04-14
Payer: COMMERCIAL

## 2021-04-14 ENCOUNTER — TELEPHONE (OUTPATIENT)
Dept: FAMILY MEDICINE CLINIC | Facility: CLINIC | Age: 61
End: 2021-04-14

## 2021-04-14 VITALS
DIASTOLIC BLOOD PRESSURE: 76 MMHG | WEIGHT: 208 LBS | BODY MASS INDEX: 29.78 KG/M2 | SYSTOLIC BLOOD PRESSURE: 110 MMHG | HEIGHT: 70 IN | TEMPERATURE: 97.8 F

## 2021-04-14 DIAGNOSIS — F51.01 PRIMARY INSOMNIA: ICD-10-CM

## 2021-04-14 DIAGNOSIS — F41.9 ANXIETY: Primary | ICD-10-CM

## 2021-04-14 PROCEDURE — 1036F TOBACCO NON-USER: CPT | Performed by: FAMILY MEDICINE

## 2021-04-14 PROCEDURE — 99213 OFFICE O/P EST LOW 20 MIN: CPT | Performed by: FAMILY MEDICINE

## 2021-04-14 PROCEDURE — 3008F BODY MASS INDEX DOCD: CPT | Performed by: FAMILY MEDICINE

## 2021-04-14 RX ORDER — ESCITALOPRAM OXALATE 10 MG/1
10 TABLET ORAL DAILY
Qty: 30 TABLET | Refills: 2 | Status: SHIPPED | OUTPATIENT
Start: 2021-04-14 | End: 2021-09-20 | Stop reason: SDUPTHER

## 2021-04-14 NOTE — PROGRESS NOTES
Assessment/Plan:  Patient will increase Lexapro 10 mg daily  Patient use Ativan at night as needed for insomnia  Follow-up in 2 months       Diagnoses and all orders for this visit:    Anxiety  -     escitalopram (LEXAPRO) 10 mg tablet; Take 1 tablet (10 mg total) by mouth daily    Primary insomnia  -     escitalopram (LEXAPRO) 10 mg tablet; Take 1 tablet (10 mg total) by mouth daily            Subjective:        Patient ID: Timothy Hinkle is a 62 y o  male  Patient is here to follow-up on anxiety as well as insomnia  Patient still having some insomnia  Patient using lorazepam but is using Lexapro daily  Anxiety has improved overall  Stressors are decreased but does still have some  No depressive mood noted  Appetite is good  The following portions of the patient's history were reviewed and updated as appropriate: allergies, current medications, past family history, past medical history, past social history, past surgical history and problem list       Review of Systems   Constitutional: Negative  HENT: Negative  Eyes: Negative  Respiratory: Negative  Cardiovascular: Negative  Gastrointestinal: Negative  Endocrine: Negative  Genitourinary: Negative  Musculoskeletal: Negative  Skin: Negative  Allergic/Immunologic: Negative  Neurological: Negative  Hematological: Negative  Psychiatric/Behavioral: The patient is nervous/anxious  Objective:      BMI Counseling: Body mass index is 29 84 kg/m²  The BMI is above normal  Nutrition recommendations include decreasing portion sizes  Exercise recommendations include moderate physical activity 150 minutes/week  Depression Screening and Follow-up Plan: Clincally patient does not have depression  No treatment is required               /76 (BP Location: Right arm, Patient Position: Sitting)   Temp 97 8 °F (36 6 °C) (Tympanic)   Ht 5' 10" (1 778 m)   Wt 94 3 kg (208 lb)   BMI 29 84 kg/m² Physical Exam  Vitals signs and nursing note reviewed  Constitutional:       General: He is not in acute distress  Appearance: Normal appearance  He is not ill-appearing, toxic-appearing or diaphoretic  HENT:      Head: Normocephalic and atraumatic  Right Ear: Tympanic membrane, ear canal and external ear normal  There is no impacted cerumen  Left Ear: Tympanic membrane, ear canal and external ear normal  There is no impacted cerumen  Nose: Nose normal  No congestion or rhinorrhea  Mouth/Throat:      Mouth: Mucous membranes are moist       Pharynx: No oropharyngeal exudate or posterior oropharyngeal erythema  Eyes:      General: No scleral icterus  Right eye: No discharge  Left eye: No discharge  Extraocular Movements: Extraocular movements intact  Conjunctiva/sclera: Conjunctivae normal       Pupils: Pupils are equal, round, and reactive to light  Neck:      Musculoskeletal: Normal range of motion and neck supple  No neck rigidity or muscular tenderness  Vascular: No carotid bruit  Cardiovascular:      Rate and Rhythm: Normal rate and regular rhythm  Pulses: Normal pulses  Heart sounds: Normal heart sounds  No murmur  No friction rub  No gallop  Pulmonary:      Effort: Pulmonary effort is normal  No respiratory distress  Breath sounds: Normal breath sounds  No stridor  No wheezing, rhonchi or rales  Chest:      Chest wall: No tenderness  Abdominal:      General: Abdomen is flat  Musculoskeletal: Normal range of motion  General: No swelling, tenderness, deformity or signs of injury  Right lower leg: No edema  Left lower leg: No edema  Lymphadenopathy:      Cervical: No cervical adenopathy  Skin:     General: Skin is warm and dry  Capillary Refill: Capillary refill takes less than 2 seconds  Coloration: Skin is not jaundiced  Findings: No bruising, erythema, lesion or rash     Neurological: Mental Status: He is alert and oriented to person, place, and time  Mental status is at baseline  Cranial Nerves: No cranial nerve deficit  Sensory: No sensory deficit  Motor: No weakness  Coordination: Coordination normal       Gait: Gait normal    Psychiatric:         Behavior: Behavior normal          Thought Content:  Thought content normal          Judgment: Judgment normal       Comments: Anxious

## 2021-06-09 ENCOUNTER — RA CDI HCC (OUTPATIENT)
Dept: OTHER | Facility: HOSPITAL | Age: 61
End: 2021-06-09

## 2021-06-09 DIAGNOSIS — F41.9 ANXIETY: ICD-10-CM

## 2021-06-09 DIAGNOSIS — M10.00 IDIOPATHIC GOUT, UNSPECIFIED CHRONICITY, UNSPECIFIED SITE: ICD-10-CM

## 2021-06-09 DIAGNOSIS — F51.01 PRIMARY INSOMNIA: ICD-10-CM

## 2021-06-09 RX ORDER — ESCITALOPRAM OXALATE 10 MG/1
10 TABLET ORAL DAILY
Qty: 30 TABLET | Refills: 0 | Status: SHIPPED | OUTPATIENT
Start: 2021-06-09 | End: 2021-06-16

## 2021-06-09 RX ORDER — ALLOPURINOL 300 MG/1
TABLET ORAL
Qty: 45 TABLET | Refills: 0 | Status: SHIPPED | OUTPATIENT
Start: 2021-06-09 | End: 2021-10-25

## 2021-06-09 NOTE — PROGRESS NOTES
[facility]  coding opportunities             Chart reviewed, (number of) suggestions sent to provider: 1     Problem listed updated  Provider Accepted, (number of) suggestions accepted: 1               Patients insurance company: [employer] (Medicare Advantage and [employer])           Re: Farrel Angles    Based on clinical documentation indicated in the medical record, it appears that the patient may have the following condition:    I42 8 - Nonischemic cardiomyopathy    This condition was last documented, assessed, and coded during the Cardiology visit on 8/20/2020  If this is correct, please document, assess, and code at your next visit on 6/16/2021    [facility]  coding opportunities             Chart reviewed, (number of) suggestions sent to provider: 1                  Patients insurance company: [employer] (Medicare Advantage and Commercial)

## 2021-06-16 ENCOUNTER — OFFICE VISIT (OUTPATIENT)
Dept: FAMILY MEDICINE CLINIC | Facility: CLINIC | Age: 61
End: 2021-06-16
Payer: COMMERCIAL

## 2021-06-16 VITALS
SYSTOLIC BLOOD PRESSURE: 112 MMHG | TEMPERATURE: 96.5 F | DIASTOLIC BLOOD PRESSURE: 62 MMHG | WEIGHT: 203 LBS | BODY MASS INDEX: 29.06 KG/M2 | HEIGHT: 70 IN

## 2021-06-16 DIAGNOSIS — Z12.5 PROSTATE CANCER SCREENING: Primary | ICD-10-CM

## 2021-06-16 DIAGNOSIS — Z00.00 WELL ADULT EXAM: ICD-10-CM

## 2021-06-16 DIAGNOSIS — I42.8 NONISCHEMIC CARDIOMYOPATHY (HCC): ICD-10-CM

## 2021-06-16 DIAGNOSIS — R07.2 PRECORDIAL PAIN: ICD-10-CM

## 2021-06-16 PROCEDURE — 3008F BODY MASS INDEX DOCD: CPT | Performed by: FAMILY MEDICINE

## 2021-06-16 PROCEDURE — 99213 OFFICE O/P EST LOW 20 MIN: CPT | Performed by: FAMILY MEDICINE

## 2021-06-16 PROCEDURE — 99396 PREV VISIT EST AGE 40-64: CPT | Performed by: FAMILY MEDICINE

## 2021-06-16 PROCEDURE — 1036F TOBACCO NON-USER: CPT | Performed by: FAMILY MEDICINE

## 2021-06-16 RX ORDER — LORAZEPAM 1 MG/1
1 TABLET ORAL AS NEEDED
Qty: 30 TABLET | Refills: 0 | Status: SHIPPED | OUTPATIENT
Start: 2021-06-16

## 2021-06-16 RX ORDER — OMEPRAZOLE 20 MG/1
20 CAPSULE, DELAYED RELEASE ORAL DAILY
COMMUNITY
End: 2021-06-16

## 2021-06-16 RX ORDER — ASPIRIN 81 MG/1
81 TABLET ORAL DAILY
COMMUNITY
End: 2021-11-30

## 2021-06-16 RX ORDER — ACETAMINOPHEN AND CODEINE PHOSPHATE 300; 30 MG/1; MG/1
TABLET ORAL
COMMUNITY
End: 2021-11-30

## 2021-06-16 NOTE — PROGRESS NOTES
Assessment/Plan: labs and vaccines reviewed  Patient have labs prior to next visit  Patient have PSA done  Patient up-to-date with colonoscopy  Patient to consider Shingrix vaccine  Diagnoses and all orders for this visit:    Prostate cancer screening  -     PSA, Total Screen; Future    Precordial pain  -     LORazepam (ATIVAN) 1 mg tablet; Take 1 tablet (1 mg total) by mouth as needed (as needed)    Nonischemic cardiomyopathy ([facility] )    Well adult exam  -     CBC and differential; Future  -     Comprehensive metabolic panel; Future  -     Lipid panel; Future  -     TSH, 3rd generation with Free T4 reflex; Future  -     PSA, Total Screen; Future    Other orders  -     acetaminophen-codeine (TYLENOL with CODEINE #3) 300-30 MG per tablet; acetaminophen 300 mg-codeine 30 mg tablet  -     aspirin (ECOTRIN LOW STRENGTH) 81 mg EC tablet; Take 81 mg by mouth daily  -     Discontinue: omeprazole (PriLOSEC) 20 mg delayed release capsule; Take 20 mg by mouth daily            Subjective:        Patient ID: Dora Rapp is a 62 y o  male  Patient is here for wellness exam     Labs and vaccines reviewed  Patient status post colonoscopy  Patient still with some anxiety  This is better overall  The following portions of the patient's history were reviewed and updated as appropriate: allergies, current medications, past family history, past medical history, past social history, past surgical history and problem list       Review of Systems   Constitutional: Negative  HENT: Negative  Eyes: Negative  Respiratory: Negative  Cardiovascular: Negative  Gastrointestinal: Negative  Endocrine: Negative  Genitourinary: Negative  Musculoskeletal: Negative  Skin: Negative  Allergic/Immunologic: Negative  Neurological: Negative  Hematological: Negative  Psychiatric/Behavioral: Negative  Objective:      BMI Counseling: Body mass index is 29 13 kg/m²   The BMI is above normal  Nutrition recommendations include decreasing portion sizes  Exercise recommendations include moderate physical activity 150 minutes/week  Depression Screening and Follow-up Plan: Clincally patient does not have depression  No treatment is required  /62 (BP Location: Right arm, Patient Position: Sitting, Cuff Size: Standard)   Temp (!) 96 5 °F (35 8 °C)   Ht 5' 10" (1 778 m)   Wt 92 1 kg (203 lb)   BMI 29 13 kg/m²          Physical Exam  Vitals and nursing note reviewed  Constitutional:       General: He is not in acute distress  Appearance: Normal appearance  He is not ill-appearing, toxic-appearing or diaphoretic  HENT:      Head: Normocephalic and atraumatic  Right Ear: Tympanic membrane, ear canal and external ear normal  There is no impacted cerumen  Left Ear: Tympanic membrane, ear canal and external ear normal  There is no impacted cerumen  Nose: Nose normal  No congestion or rhinorrhea  Mouth/Throat:      Mouth: Mucous membranes are moist       Pharynx: No oropharyngeal exudate or posterior oropharyngeal erythema  Eyes:      General: No scleral icterus  Right eye: No discharge  Left eye: No discharge  Extraocular Movements: Extraocular movements intact  Conjunctiva/sclera: Conjunctivae normal       Pupils: Pupils are equal, round, and reactive to light  Neck:      Vascular: No carotid bruit  Cardiovascular:      Rate and Rhythm: Normal rate and regular rhythm  Pulses: Normal pulses  Heart sounds: Normal heart sounds  No murmur heard  No friction rub  No gallop  Pulmonary:      Effort: Pulmonary effort is normal  No respiratory distress  Breath sounds: Normal breath sounds  No stridor  No wheezing, rhonchi or rales  Chest:      Chest wall: No tenderness  Abdominal:      General: Abdomen is flat  Bowel sounds are normal  There is no distension  Palpations: Abdomen is soft        Tenderness: There is no abdominal tenderness  There is no guarding or rebound  Musculoskeletal:         General: No swelling, tenderness, deformity or signs of injury  Normal range of motion  Cervical back: Normal range of motion and neck supple  No rigidity  No muscular tenderness  Right lower leg: No edema  Left lower leg: No edema  Lymphadenopathy:      Cervical: No cervical adenopathy  Skin:     General: Skin is warm and dry  Capillary Refill: Capillary refill takes less than 2 seconds  Coloration: Skin is not jaundiced  Findings: No bruising, erythema, lesion or rash  Neurological:      Mental Status: He is alert and oriented to person, place, and time  Mental status is at baseline  Cranial Nerves: No cranial nerve deficit  Sensory: No sensory deficit  Motor: No weakness  Coordination: Coordination normal       Gait: Gait normal    Psychiatric:         Mood and Affect: Mood normal          Behavior: Behavior normal          Thought Content:  Thought content normal          Judgment: Judgment normal

## 2021-07-26 ENCOUNTER — HOSPITAL ENCOUNTER (OUTPATIENT)
Dept: NON INVASIVE DIAGNOSTICS | Facility: HOSPITAL | Age: 61
Discharge: HOME | End: 2021-07-26
Attending: INTERNAL MEDICINE
Payer: COMMERCIAL

## 2021-07-26 DIAGNOSIS — I42.8 NONISCHEMIC CARDIOMYOPATHY (HCC): ICD-10-CM

## 2021-07-26 DIAGNOSIS — I49.3 PVC (PREMATURE VENTRICULAR CONTRACTION): ICD-10-CM

## 2021-07-26 PROCEDURE — 93226 XTRNL ECG REC<48 HR SCAN A/R: CPT

## 2021-07-26 PROCEDURE — 93306 TTE W/DOPPLER COMPLETE: CPT

## 2021-07-26 PROCEDURE — 93225 XTRNL ECG REC<48 HRS REC: CPT

## 2021-07-31 PROCEDURE — 93227 XTRNL ECG REC<48 HR R&I: CPT

## 2021-08-11 ENCOUNTER — RA CDI HCC (OUTPATIENT)
Dept: OTHER | Facility: HOSPITAL | Age: 61
End: 2021-08-11

## 2021-08-12 NOTE — PROGRESS NOTES
[facility]  coding opportunities       Chart reviewed, no opportunity found: CHART REVIEWED, NO OPPORTUNITY FOUND                        Patients insurance company:  [employer] [facility] (Medicare Advantage and Commercial)

## 2021-08-19 ENCOUNTER — OFFICE VISIT (OUTPATIENT)
Dept: FAMILY MEDICINE CLINIC | Facility: CLINIC | Age: 61
End: 2021-08-19
Payer: COMMERCIAL

## 2021-08-19 VITALS
SYSTOLIC BLOOD PRESSURE: 130 MMHG | HEIGHT: 70 IN | WEIGHT: 206.8 LBS | BODY MASS INDEX: 29.6 KG/M2 | DIASTOLIC BLOOD PRESSURE: 78 MMHG

## 2021-08-19 DIAGNOSIS — F41.9 ANXIETY: Primary | ICD-10-CM

## 2021-08-19 DIAGNOSIS — F51.01 PRIMARY INSOMNIA: ICD-10-CM

## 2021-08-19 PROCEDURE — 3008F BODY MASS INDEX DOCD: CPT | Performed by: FAMILY MEDICINE

## 2021-08-19 PROCEDURE — 99213 OFFICE O/P EST LOW 20 MIN: CPT | Performed by: FAMILY MEDICINE

## 2021-08-19 PROCEDURE — 1036F TOBACCO NON-USER: CPT | Performed by: FAMILY MEDICINE

## 2021-08-19 NOTE — PROGRESS NOTES
Assessment/Plan:  Patient will continue with current medication and follow-up in 1 month  Patient will remain out of work during this time frame  Diagnoses and all orders for this visit:    Anxiety    Primary insomnia            Subjective:        Patient ID: Gonzalez Romero is a 62 y o  male  Patient follow-up on anxiety and insomnia  Patient with multiple stressors  Patient using some bourbon at night with good results  Patient sleeps well with this  The following portions of the patient's history were reviewed and updated as appropriate: allergies, current medications, past family history, past medical history, past social history, past surgical history and problem list       Review of Systems   Constitutional: Negative  HENT: Negative  Eyes: Negative  Respiratory: Negative  Cardiovascular: Negative  Gastrointestinal: Negative  Endocrine: Negative  Genitourinary: Negative  Musculoskeletal: Negative  Skin: Negative  Allergic/Immunologic: Negative  Neurological: Negative  Hematological: Negative  Psychiatric/Behavioral: The patient is nervous/anxious  Objective:        Depression Screening and Follow-up Plan: Patient assessed for underlying major depression  Brief counseling provided and recommend additional follow-up/re-evaluation next office visit  /78 (BP Location: Right arm, Patient Position: Sitting, Cuff Size: Standard)   Ht 5' 10" (1 778 m)   Wt 93 8 kg (206 lb 12 8 oz)   BMI 29 67 kg/m²          Physical Exam  Vitals and nursing note reviewed  Constitutional:       General: He is not in acute distress  Appearance: Normal appearance  He is not ill-appearing, toxic-appearing or diaphoretic  HENT:      Head: Normocephalic and atraumatic  Right Ear: Tympanic membrane, ear canal and external ear normal  There is no impacted cerumen        Left Ear: Tympanic membrane, ear canal and external ear normal  There is no impacted cerumen  Nose: Nose normal  No congestion or rhinorrhea  Eyes:      General: No scleral icterus  Right eye: No discharge  Left eye: No discharge  Extraocular Movements: Extraocular movements intact  Conjunctiva/sclera: Conjunctivae normal       Pupils: Pupils are equal, round, and reactive to light  Neck:      Vascular: No carotid bruit  Cardiovascular:      Rate and Rhythm: Normal rate and regular rhythm  Pulses: Normal pulses  Heart sounds: Normal heart sounds  No murmur heard  No friction rub  No gallop  Pulmonary:      Effort: Pulmonary effort is normal  No respiratory distress  Breath sounds: Normal breath sounds  No stridor  No wheezing, rhonchi or rales  Chest:      Chest wall: No tenderness  Musculoskeletal:         General: No swelling, tenderness, deformity or signs of injury  Cervical back: Normal range of motion and neck supple  No rigidity  No muscular tenderness  Right lower leg: No edema  Left lower leg: No edema  Lymphadenopathy:      Cervical: No cervical adenopathy  Skin:     General: Skin is warm and dry  Capillary Refill: Capillary refill takes less than 2 seconds  Coloration: Skin is not jaundiced  Findings: No bruising, erythema, lesion or rash  Neurological:      Mental Status: He is alert and oriented to person, place, and time  Mental status is at baseline  Cranial Nerves: No cranial nerve deficit  Sensory: No sensory deficit  Motor: No weakness  Coordination: Coordination normal       Gait: Gait normal    Psychiatric:         Behavior: Behavior normal          Thought Content:  Thought content normal          Judgment: Judgment normal       Comments: Anxious

## 2021-09-12 DIAGNOSIS — K21.00 GASTROESOPHAGEAL REFLUX DISEASE WITH ESOPHAGITIS: ICD-10-CM

## 2021-09-13 RX ORDER — PANTOPRAZOLE SODIUM 40 MG/1
TABLET, DELAYED RELEASE ORAL
Qty: 180 TABLET | Refills: 0 | Status: SHIPPED | OUTPATIENT
Start: 2021-09-13

## 2021-09-20 ENCOUNTER — OFFICE VISIT (OUTPATIENT)
Dept: FAMILY MEDICINE CLINIC | Facility: CLINIC | Age: 61
End: 2021-09-20
Payer: COMMERCIAL

## 2021-09-20 ENCOUNTER — DOCUMENTATION (OUTPATIENT)
Dept: FAMILY MEDICINE CLINIC | Facility: CLINIC | Age: 61
End: 2021-09-20

## 2021-09-20 VITALS
SYSTOLIC BLOOD PRESSURE: 110 MMHG | HEART RATE: 68 BPM | DIASTOLIC BLOOD PRESSURE: 80 MMHG | OXYGEN SATURATION: 95 % | WEIGHT: 207 LBS | HEIGHT: 70 IN | TEMPERATURE: 97.7 F | BODY MASS INDEX: 29.63 KG/M2

## 2021-09-20 DIAGNOSIS — Z23 NEED FOR IMMUNIZATION AGAINST INFLUENZA: Primary | ICD-10-CM

## 2021-09-20 DIAGNOSIS — M54.50 CHRONIC LOW BACK PAIN, UNSPECIFIED BACK PAIN LATERALITY, UNSPECIFIED WHETHER SCIATICA PRESENT: ICD-10-CM

## 2021-09-20 DIAGNOSIS — F41.9 ANXIETY: ICD-10-CM

## 2021-09-20 DIAGNOSIS — F51.01 PRIMARY INSOMNIA: ICD-10-CM

## 2021-09-20 DIAGNOSIS — G89.29 CHRONIC LOW BACK PAIN, UNSPECIFIED BACK PAIN LATERALITY, UNSPECIFIED WHETHER SCIATICA PRESENT: ICD-10-CM

## 2021-09-20 PROCEDURE — 3008F BODY MASS INDEX DOCD: CPT | Performed by: FAMILY MEDICINE

## 2021-09-20 PROCEDURE — 90471 IMMUNIZATION ADMIN: CPT

## 2021-09-20 PROCEDURE — 90682 RIV4 VACC RECOMBINANT DNA IM: CPT

## 2021-09-20 PROCEDURE — 3725F SCREEN DEPRESSION PERFORMED: CPT | Performed by: FAMILY MEDICINE

## 2021-09-20 PROCEDURE — 99213 OFFICE O/P EST LOW 20 MIN: CPT | Performed by: FAMILY MEDICINE

## 2021-09-20 PROCEDURE — 1036F TOBACCO NON-USER: CPT | Performed by: FAMILY MEDICINE

## 2021-09-20 RX ORDER — ESCITALOPRAM OXALATE 20 MG/1
20 TABLET ORAL DAILY
Qty: 30 TABLET | Refills: 2 | Status: SHIPPED | OUTPATIENT
Start: 2021-09-20 | End: 2022-02-15

## 2021-09-20 NOTE — PROGRESS NOTES
Assessment/Plan:  Flu shot given at this time  Patient have Lexapro increased 20 mg daily  Patient follow-up in 2 months       Diagnoses and all orders for this visit:    Need for immunization against influenza  -     FLUBLOK: influenza vaccine, quadrivalent, recombinant, PF, 0 5 mL    Anxiety  -     escitalopram (LEXAPRO) 20 mg tablet; Take 1 tablet (20 mg total) by mouth daily    Chronic low back pain, unspecified back pain laterality, unspecified whether sciatica present    Primary insomnia  -     escitalopram (LEXAPRO) 20 mg tablet; Take 1 tablet (20 mg total) by mouth daily            Subjective:        Patient ID: Angel Majano is a 62 y o  male  Patient follow-up on anxiety  Patient with some right ankle pain with history of trauma in the past   Patient has worn braces for of lytic events for bilateral ankles  Patient also with left knee pain medially  Patient also with chronic back pain  No medications for this at this time  Anxiety unchanged overall  The following portions of the patient's history were reviewed and updated as appropriate: allergies, current medications, past family history, past medical history, past social history, past surgical history and problem list       Review of Systems   Constitutional: Negative  HENT: Negative  Eyes: Negative  Respiratory: Negative  Cardiovascular: Negative  Gastrointestinal: Negative  Endocrine: Negative  Genitourinary: Negative  Musculoskeletal: Positive for arthralgias and back pain  Skin: Negative  Allergic/Immunologic: Negative  Neurological: Negative  Hematological: Negative  Psychiatric/Behavioral: The patient is nervous/anxious  Objective:        Depression Screening and Follow-up Plan:   Patient was screened for depression during today's encounter   They screened negative with a PHQ-2 score of 1             /80 (BP Location: Right arm, Patient Position: Sitting, Cuff Size: Adult) Pulse 68   Temp 97 7 °F (36 5 °C) (Tympanic)   Ht 5' 10" (1 778 m)   Wt 93 9 kg (207 lb)   SpO2 95%   BMI 29 70 kg/m²          Physical Exam  Constitutional:       General: He is not in acute distress  Appearance: He is not ill-appearing, toxic-appearing or diaphoretic  Cardiovascular:      Rate and Rhythm: Normal rate and regular rhythm  Pulses: Normal pulses  Heart sounds: Normal heart sounds  Pulmonary:      Effort: Pulmonary effort is normal       Breath sounds: Normal breath sounds  Neurological:      Mental Status: He is alert  Psychiatric:         Behavior: Behavior normal          Thought Content:  Thought content normal       Comments: Anxious

## 2021-11-29 ENCOUNTER — OFFICE VISIT (OUTPATIENT)
Dept: CARDIAC SURGERY | Facility: CLINIC | Age: 61
End: 2021-11-29
Payer: COMMERCIAL

## 2021-11-29 VITALS
RESPIRATION RATE: 20 BRPM | HEART RATE: 69 BPM | OXYGEN SATURATION: 96 % | HEIGHT: 70 IN | SYSTOLIC BLOOD PRESSURE: 130 MMHG | BODY MASS INDEX: 30.58 KG/M2 | WEIGHT: 213.6 LBS | DIASTOLIC BLOOD PRESSURE: 77 MMHG

## 2021-11-29 DIAGNOSIS — I42.8 NONISCHEMIC CARDIOMYOPATHY (HCC): ICD-10-CM

## 2021-11-29 DIAGNOSIS — M17.10 ARTHRITIS OF KNEE: ICD-10-CM

## 2021-11-29 DIAGNOSIS — I49.3 PVC (PREMATURE VENTRICULAR CONTRACTION): Primary | ICD-10-CM

## 2021-11-29 PROCEDURE — 93000 ELECTROCARDIOGRAM COMPLETE: CPT | Performed by: INTERNAL MEDICINE

## 2021-11-29 PROCEDURE — 1036F TOBACCO NON-USER: CPT | Performed by: INTERNAL MEDICINE

## 2021-11-29 PROCEDURE — 99214 OFFICE O/P EST MOD 30 MIN: CPT | Performed by: INTERNAL MEDICINE

## 2021-11-29 RX ORDER — MULTIVITAMIN
1 CAPSULE ORAL DAILY
COMMUNITY

## 2021-11-30 ENCOUNTER — OFFICE VISIT (OUTPATIENT)
Dept: FAMILY MEDICINE CLINIC | Facility: CLINIC | Age: 61
End: 2021-11-30
Payer: COMMERCIAL

## 2021-11-30 ENCOUNTER — DOCUMENTATION (OUTPATIENT)
Dept: FAMILY MEDICINE CLINIC | Facility: CLINIC | Age: 61
End: 2021-11-30

## 2021-11-30 VITALS
SYSTOLIC BLOOD PRESSURE: 128 MMHG | DIASTOLIC BLOOD PRESSURE: 82 MMHG | BODY MASS INDEX: 30.21 KG/M2 | TEMPERATURE: 97.5 F | OXYGEN SATURATION: 97 % | WEIGHT: 211 LBS | HEIGHT: 70 IN | HEART RATE: 58 BPM

## 2021-11-30 DIAGNOSIS — I42.8 NONISCHEMIC CARDIOMYOPATHY (HCC): Primary | ICD-10-CM

## 2021-11-30 DIAGNOSIS — M25.562 CHRONIC PAIN OF BOTH KNEES: ICD-10-CM

## 2021-11-30 DIAGNOSIS — F41.9 ANXIETY: ICD-10-CM

## 2021-11-30 DIAGNOSIS — F11.20 CONTINUOUS OPIOID DEPENDENCE (HCC): ICD-10-CM

## 2021-11-30 DIAGNOSIS — G89.29 CHRONIC PAIN OF BOTH KNEES: ICD-10-CM

## 2021-11-30 DIAGNOSIS — M25.561 CHRONIC PAIN OF BOTH KNEES: ICD-10-CM

## 2021-11-30 PROCEDURE — 99213 OFFICE O/P EST LOW 20 MIN: CPT | Performed by: FAMILY MEDICINE

## 2021-11-30 PROCEDURE — 3008F BODY MASS INDEX DOCD: CPT | Performed by: INTERNAL MEDICINE

## 2021-12-02 ENCOUNTER — OFFICE VISIT (OUTPATIENT)
Dept: OBGYN CLINIC | Facility: CLINIC | Age: 61
End: 2021-12-02
Payer: COMMERCIAL

## 2021-12-02 VITALS
HEART RATE: 77 BPM | HEIGHT: 70 IN | SYSTOLIC BLOOD PRESSURE: 130 MMHG | DIASTOLIC BLOOD PRESSURE: 82 MMHG | BODY MASS INDEX: 30.32 KG/M2 | WEIGHT: 211.8 LBS

## 2021-12-02 DIAGNOSIS — M17.11 PRIMARY OSTEOARTHRITIS OF RIGHT KNEE: ICD-10-CM

## 2021-12-02 DIAGNOSIS — M17.12 PRIMARY OSTEOARTHRITIS OF LEFT KNEE: Primary | ICD-10-CM

## 2021-12-02 PROCEDURE — 99203 OFFICE O/P NEW LOW 30 MIN: CPT | Performed by: ORTHOPAEDIC SURGERY

## 2021-12-09 ENCOUNTER — OFFICE VISIT (OUTPATIENT)
Dept: OBGYN CLINIC | Facility: CLINIC | Age: 61
End: 2021-12-09
Payer: COMMERCIAL

## 2021-12-09 VITALS
SYSTOLIC BLOOD PRESSURE: 125 MMHG | HEIGHT: 70 IN | TEMPERATURE: 97.8 F | WEIGHT: 211 LBS | BODY MASS INDEX: 30.21 KG/M2 | DIASTOLIC BLOOD PRESSURE: 83 MMHG | HEART RATE: 61 BPM

## 2021-12-09 DIAGNOSIS — M17.0 BILATERAL PRIMARY OSTEOARTHRITIS OF KNEE: Primary | ICD-10-CM

## 2021-12-09 PROCEDURE — 3008F BODY MASS INDEX DOCD: CPT | Performed by: PHYSICIAN ASSISTANT

## 2021-12-09 PROCEDURE — 1036F TOBACCO NON-USER: CPT | Performed by: PHYSICIAN ASSISTANT

## 2021-12-09 PROCEDURE — 20610 DRAIN/INJ JOINT/BURSA W/O US: CPT | Performed by: PHYSICIAN ASSISTANT

## 2021-12-09 PROCEDURE — 99213 OFFICE O/P EST LOW 20 MIN: CPT | Performed by: PHYSICIAN ASSISTANT

## 2021-12-09 RX ORDER — LIDOCAINE HYDROCHLORIDE 10 MG/ML
3 INJECTION, SOLUTION INFILTRATION; PERINEURAL
Status: COMPLETED | OUTPATIENT
Start: 2021-12-09 | End: 2021-12-09

## 2021-12-09 RX ORDER — METHYLPREDNISOLONE ACETATE 40 MG/ML
2 INJECTION, SUSPENSION INTRA-ARTICULAR; INTRALESIONAL; INTRAMUSCULAR; SOFT TISSUE
Status: COMPLETED | OUTPATIENT
Start: 2021-12-09 | End: 2021-12-09

## 2021-12-09 RX ADMIN — LIDOCAINE HYDROCHLORIDE 3 ML: 10 INJECTION, SOLUTION INFILTRATION; PERINEURAL at 08:48

## 2021-12-09 RX ADMIN — METHYLPREDNISOLONE ACETATE 2 ML: 40 INJECTION, SUSPENSION INTRA-ARTICULAR; INTRALESIONAL; INTRAMUSCULAR; SOFT TISSUE at 08:48

## 2021-12-16 ENCOUNTER — EVALUATION (OUTPATIENT)
Dept: PHYSICAL THERAPY | Facility: CLINIC | Age: 61
End: 2021-12-16
Payer: COMMERCIAL

## 2021-12-16 DIAGNOSIS — M17.12 PRIMARY OSTEOARTHRITIS OF LEFT KNEE: Primary | ICD-10-CM

## 2021-12-16 DIAGNOSIS — M17.11 PRIMARY OSTEOARTHRITIS OF RIGHT KNEE: ICD-10-CM

## 2021-12-16 PROCEDURE — 97161 PT EVAL LOW COMPLEX 20 MIN: CPT | Performed by: PHYSICAL THERAPIST

## 2021-12-16 PROCEDURE — 97112 NEUROMUSCULAR REEDUCATION: CPT | Performed by: PHYSICAL THERAPIST

## 2021-12-16 PROCEDURE — 97140 MANUAL THERAPY 1/> REGIONS: CPT | Performed by: PHYSICAL THERAPIST

## 2021-12-21 ENCOUNTER — OFFICE VISIT (OUTPATIENT)
Dept: PHYSICAL THERAPY | Facility: CLINIC | Age: 61
End: 2021-12-21
Payer: COMMERCIAL

## 2021-12-21 DIAGNOSIS — M17.11 PRIMARY OSTEOARTHRITIS OF RIGHT KNEE: ICD-10-CM

## 2021-12-21 DIAGNOSIS — M17.12 PRIMARY OSTEOARTHRITIS OF LEFT KNEE: Primary | ICD-10-CM

## 2021-12-21 PROCEDURE — 97110 THERAPEUTIC EXERCISES: CPT | Performed by: PHYSICAL THERAPIST

## 2021-12-21 PROCEDURE — 97112 NEUROMUSCULAR REEDUCATION: CPT | Performed by: PHYSICAL THERAPIST

## 2021-12-21 PROCEDURE — 97530 THERAPEUTIC ACTIVITIES: CPT | Performed by: PHYSICAL THERAPIST

## 2021-12-29 ENCOUNTER — OFFICE VISIT (OUTPATIENT)
Dept: PHYSICAL THERAPY | Facility: CLINIC | Age: 61
End: 2021-12-29
Payer: COMMERCIAL

## 2021-12-29 DIAGNOSIS — M17.11 PRIMARY OSTEOARTHRITIS OF RIGHT KNEE: ICD-10-CM

## 2021-12-29 DIAGNOSIS — M17.12 PRIMARY OSTEOARTHRITIS OF LEFT KNEE: Primary | ICD-10-CM

## 2021-12-29 PROCEDURE — 97112 NEUROMUSCULAR REEDUCATION: CPT

## 2021-12-29 PROCEDURE — 97110 THERAPEUTIC EXERCISES: CPT

## 2022-01-04 ENCOUNTER — OFFICE VISIT (OUTPATIENT)
Dept: PHYSICAL THERAPY | Facility: CLINIC | Age: 62
End: 2022-01-04
Payer: COMMERCIAL

## 2022-01-04 DIAGNOSIS — M17.12 PRIMARY OSTEOARTHRITIS OF LEFT KNEE: Primary | ICD-10-CM

## 2022-01-04 DIAGNOSIS — M17.11 PRIMARY OSTEOARTHRITIS OF RIGHT KNEE: ICD-10-CM

## 2022-01-04 PROCEDURE — 97110 THERAPEUTIC EXERCISES: CPT | Performed by: PHYSICAL THERAPIST

## 2022-01-04 PROCEDURE — 97530 THERAPEUTIC ACTIVITIES: CPT | Performed by: PHYSICAL THERAPIST

## 2022-01-04 PROCEDURE — 97112 NEUROMUSCULAR REEDUCATION: CPT | Performed by: PHYSICAL THERAPIST

## 2022-01-04 NOTE — PROGRESS NOTES
Daily Note     Today's date: 2022  Patient name: Adelita Angeles  : 1960  MRN: [id number]  Referring provider: Sabra Merida  Dx:   Encounter Diagnosis     ICD-10-CM    1  Primary osteoarthritis of left knee  M17 12    2  Primary osteoarthritis of right knee  M17 11                   Subjective: Dax Shah is feeling much better, notices hip abductor weakness by end of session today  Objective: See treatment diary below      Assessment: Tolerated treatment well  Patient demonstrated fatigue post treatment and exhibited good technique with therapeutic exercises      Plan: Potential discharge next visit  Precautions: B knee OA      Manuals 12/16 12/21 12/29 1/3         Patella borders, quad tendon, patella tendon     10'                                                   Neuro Re-Ed             HS elongation 10"x10            Prone RF stretch 15x            Functional HR  10"10 10"10 10:x10         VG DL   50% 20x 50%  30x JL         VG SL  50% 3x10 50%   3x10 JL         RDL  20# 2x10 20#  3x10 25# 3x10         TB side step, monster walk  GTB 4x ea GTB  4x ea JL         Functional step up    10x ea         Ther Ex             Bike  L4 10' L4 10' L4 10'                                                                                                    Ther Activity                                       Gait Training                                       Modalities

## 2022-01-06 ENCOUNTER — OFFICE VISIT (OUTPATIENT)
Dept: PHYSICAL THERAPY | Facility: CLINIC | Age: 62
End: 2022-01-06
Payer: COMMERCIAL

## 2022-01-06 DIAGNOSIS — M17.12 PRIMARY OSTEOARTHRITIS OF LEFT KNEE: Primary | ICD-10-CM

## 2022-01-06 DIAGNOSIS — M17.11 PRIMARY OSTEOARTHRITIS OF RIGHT KNEE: ICD-10-CM

## 2022-01-06 PROCEDURE — 97110 THERAPEUTIC EXERCISES: CPT

## 2022-01-06 PROCEDURE — 97530 THERAPEUTIC ACTIVITIES: CPT

## 2022-01-06 PROCEDURE — 97112 NEUROMUSCULAR REEDUCATION: CPT

## 2022-01-06 NOTE — PROGRESS NOTES
Daily Note / Discharge Summary     Today's date: 2022  Patient name: Angel Guo  : 1960  MRN: [id number]  Referring provider: Meghan Avila  Dx:   Encounter Diagnosis     ICD-10-CM    1  Primary osteoarthritis of left knee  M17 12    2  Primary osteoarthritis of right knee  M17 11                   Subjective: Jennifer Manger reports muscle soreness b/l quads after progression of exercise last visit  Pt states 100% improved since starting therapy  Objective: See treatment diary below      Assessment: Tolerated treatment well  Patient demonstrated fatigue post treatment and exhibited good technique with therapeutic exercises  Pt independent with HEP  Plan: D/C PT to HEP  Precautions: B knee OA      Manuals 12/16 12/21 12/29 1/3 1/6        Patella borders, quad tendon, patella tendon     10'                                                   Neuro Re-Ed             HS elongation 10"x10            Prone RF stretch 15x            Functional HR  10"10 10"10 10:x10 GH        VG DL   50% 20x 50%  30x JL GH        VG SL  50% 3x10 50%   3x10 JL GH        RDL  20# 2x10 20#  3x10 25# 3x10 GH        TB side step, monster walk  GTB 4x ea GTB  4x ea JL GH        Functional step up    10x ea GH        Ther Ex             Bike  L4 10' L4 10' L4 10' 10' L1                                                                                                   Ther Activity                                       Gait Training                                       Modalities

## 2022-01-19 ENCOUNTER — TELEPHONE (OUTPATIENT)
Dept: FAMILY MEDICINE CLINIC | Facility: CLINIC | Age: 62
End: 2022-01-19

## 2022-01-19 NOTE — TELEPHONE ENCOUNTER
Pascual Augustin from / [facility] called in stating they will be discharging patient today, he is doing well s/p fall  They did note a rash on lower lumbar area - red dots that gets more irritated when sweaty  They have been using cordisone cream on the area but they are not sure if anything else should be used

## 2022-01-19 NOTE — TELEPHONE ENCOUNTER
Notifed \[facility]\"" at [facility] to cont with Cortizone Cr    Will call if condition worsens

## 2022-02-10 ENCOUNTER — OFFICE VISIT (OUTPATIENT)
Dept: OBGYN CLINIC | Facility: CLINIC | Age: 62
End: 2022-02-10
Payer: COMMERCIAL

## 2022-02-10 VITALS
HEIGHT: 70 IN | WEIGHT: 207 LBS | DIASTOLIC BLOOD PRESSURE: 79 MMHG | SYSTOLIC BLOOD PRESSURE: 118 MMHG | BODY MASS INDEX: 29.63 KG/M2 | TEMPERATURE: 97.7 F | HEART RATE: 71 BPM

## 2022-02-10 DIAGNOSIS — M17.0 BILATERAL PRIMARY OSTEOARTHRITIS OF KNEE: Primary | ICD-10-CM

## 2022-02-10 PROCEDURE — 99213 OFFICE O/P EST LOW 20 MIN: CPT | Performed by: ORTHOPAEDIC SURGERY

## 2022-02-10 NOTE — PROGRESS NOTES
Assessment/Plan:  1  Bilateral primary osteoarthritis of knee      Patient notes continued improvement in his knee pain after undergoing bilateral knee corticosteroid injections 12/9/21  At this time and recommend continue conservative management with activity modification and over-the-counter anti-inflammatories as needed  Patient understands intra-articular corticosteroid injections may be performed into the knees every three months as needed  Follow-up as needed  Return if symptoms worsen or fail to improve  I answered all of the patient's questions during the visit and provided education of the patient's condition during the visit  The patient verbalized understanding of the information given and agrees with the plan  This note was dictated using [employer] software  It may contain errors including improperly dictated words  Please contact physician directly for any questions  Subjective   Chief Complaint:  Follow-up bilateral knee DJD    HPI  Thiago Beltran is a 62 y o  male who presents for follow up for regarding his bilateral knees  He was last seen on 12/9/21  At that time he received corticosteroid injections into the bilateral knees  Since receiving these injections he notes overall significant improvement in his knee pain  Currently denies any pain at this time  He denies any stiffness, weakness or instability, swelling, or pain  He denies numbness and tingling in the lower extremity  He currently takes nothing for pain  No fevers or chills  He is extremely happy with his result  Review of Systems  ROS:    See HPI for musculoskeletal review     All other systems reviewed are negative     History:  Past Medical History:   Diagnosis Date    Anxiety     BPH (benign prostatic hyperplasia)     Elevated LDL cholesterol level 09/18/2018    GERD (gastroesophageal reflux disease)     Gout     Herpes     simplex - lower lip     Hx of splenomegaly     mild    Hyperuricemia     Idiopathic cardiomyopathy (HCC)     Parapelvic renal cyst     Sinus bradycardia      Past Surgical History:   Procedure Laterality Date    CARDIAC CATHETERIZATION  08/30/2013    COLONOSCOPY      ROTATOR CUFF REPAIR Right     UMBILICAL HERNIA REPAIR      WRIST SURGERY Left      Social History   Social History     Substance and Sexual Activity   Alcohol Use Yes    Comment: socially     Social History     Substance and Sexual Activity   Drug Use No     Social History     Tobacco Use   Smoking Status Never Smoker   Smokeless Tobacco Never Used     Family History:   Family History   Problem Relation Age of Onset    Cancer Family     Diabetes Family         mellitus     Heart disease Family     Hypertension Family     Parkinsonism Family     Parkinsonism Mother     Heart attack Father     Kidney disease Father     Diabetes Paternal Grandmother     Arthritis Maternal Grandmother        Current Outpatient Medications on File Prior to Visit   Medication Sig Dispense Refill    allopurinol (ZYLOPRIM) 300 mg tablet TAKE ONE TABLET BY MOUTH EVERY OTHER DAY 45 tablet 0    cholecalciferol (VITAMIN D3) 1,000 units tablet Take by mouth daily       escitalopram (LEXAPRO) 20 mg tablet Take 1 tablet (20 mg total) by mouth daily 30 tablet 2    LORazepam (ATIVAN) 1 mg tablet Take 1 tablet (1 mg total) by mouth as needed (as needed) 30 tablet 0    Multiple Vitamin (multivitamin) capsule Take 1 capsule by mouth daily      pantoprazole (PROTONIX) 40 mg tablet TAKE ONE TABLET BY MOUTH TWICE DAILY  180 tablet 0     No current facility-administered medications on file prior to visit       No Known Allergies     Objective     /79   Pulse 71   Temp 97 7 °F (36 5 °C)   Ht 5' 10" (1 778 m)   Wt 93 9 kg (207 lb)   BMI 29 70 kg/m²      PE:  AAOx 3  WDWN  Hearing intact, no drainage from eyes  no audible wheezing  no abdominal distension  LE compartments soft, skin intact    Ortho Exam:  bilateral Knee:   No erythema  no swelling  no effusion  no warmth  AROM: full  Stable to varus/valgus stress

## 2022-02-15 DIAGNOSIS — F41.9 ANXIETY: ICD-10-CM

## 2022-02-15 DIAGNOSIS — M10.00 IDIOPATHIC GOUT, UNSPECIFIED CHRONICITY, UNSPECIFIED SITE: ICD-10-CM

## 2022-02-15 DIAGNOSIS — F51.01 PRIMARY INSOMNIA: ICD-10-CM

## 2022-02-15 RX ORDER — ALLOPURINOL 300 MG/1
TABLET ORAL
Qty: 45 TABLET | Refills: 0 | Status: SHIPPED | OUTPATIENT
Start: 2022-02-15 | End: 2022-06-06 | Stop reason: SDUPTHER

## 2022-02-15 RX ORDER — ESCITALOPRAM OXALATE 20 MG/1
TABLET ORAL
Qty: 30 TABLET | Refills: 0 | Status: SHIPPED | OUTPATIENT
Start: 2022-02-15 | End: 2022-02-28 | Stop reason: SDUPTHER

## 2022-02-28 ENCOUNTER — OFFICE VISIT (OUTPATIENT)
Dept: FAMILY MEDICINE CLINIC | Facility: CLINIC | Age: 62
End: 2022-02-28
Payer: COMMERCIAL

## 2022-02-28 VITALS
BODY MASS INDEX: 28.49 KG/M2 | DIASTOLIC BLOOD PRESSURE: 60 MMHG | HEART RATE: 69 BPM | OXYGEN SATURATION: 98 % | SYSTOLIC BLOOD PRESSURE: 90 MMHG | WEIGHT: 199 LBS | HEIGHT: 70 IN | TEMPERATURE: 96.2 F

## 2022-02-28 DIAGNOSIS — I42.8 NONISCHEMIC CARDIOMYOPATHY (HCC): ICD-10-CM

## 2022-02-28 DIAGNOSIS — S22.42XD CLOSED FRACTURE OF MULTIPLE RIBS OF LEFT SIDE WITH ROUTINE HEALING, SUBSEQUENT ENCOUNTER: Primary | ICD-10-CM

## 2022-02-28 DIAGNOSIS — F51.01 PRIMARY INSOMNIA: ICD-10-CM

## 2022-02-28 DIAGNOSIS — F41.9 ANXIETY: ICD-10-CM

## 2022-02-28 DIAGNOSIS — F11.20 CONTINUOUS OPIOID DEPENDENCE (HCC): ICD-10-CM

## 2022-02-28 PROCEDURE — 1036F TOBACCO NON-USER: CPT | Performed by: FAMILY MEDICINE

## 2022-02-28 PROCEDURE — 99214 OFFICE O/P EST MOD 30 MIN: CPT | Performed by: FAMILY MEDICINE

## 2022-02-28 PROCEDURE — 3008F BODY MASS INDEX DOCD: CPT | Performed by: FAMILY MEDICINE

## 2022-02-28 RX ORDER — OXYCODONE HYDROCHLORIDE 5 MG/1
TABLET ORAL
COMMUNITY
Start: 2022-01-10 | End: 2022-06-06

## 2022-02-28 RX ORDER — ESCITALOPRAM OXALATE 20 MG/1
20 TABLET ORAL DAILY
Qty: 90 TABLET | Refills: 1 | Status: SHIPPED | OUTPATIENT
Start: 2022-02-28

## 2022-02-28 RX ORDER — METHOCARBAMOL 500 MG/1
TABLET, FILM COATED ORAL
COMMUNITY
Start: 2022-01-10

## 2022-02-28 NOTE — PROGRESS NOTES
Assessment/Plan:  The patient use methocarbamol as well as Tylenol or ibuprofen bedtime  Patient will also use Ativan  Continue with Lexapro  Patient will follow-up in 3 months  Records reviewed  X-ray reviewed  Diagnoses and all orders for this visit:    Closed fracture of multiple ribs of left side with routine healing, subsequent encounter    Anxiety  -     escitalopram (LEXAPRO) 20 mg tablet; Take 1 tablet (20 mg total) by mouth daily    Primary insomnia  -     escitalopram (LEXAPRO) 20 mg tablet; Take 1 tablet (20 mg total) by mouth daily    Nonischemic cardiomyopathy (HCC)    Continuous opioid dependence ([facility] [address] )    Other orders  -     methocarbamol (ROBAXIN) 500 mg tablet  -     oxyCODONE (ROXICODONE) 5 immediate release tablet;  (Patient not taking: Reported on 2/28/2022 )            Subjective:        Patient ID: Gertrude Orta is a 62 y o  male  Patient is here to follow-up the on rib fractures  Patient is having difficulty at night sleeping due to rib pain  Mood is stable overall  No substernal chest pain or shortness of breath  Patient use Tylenol and muscle relaxer  The following portions of the patient's history were reviewed and updated as appropriate: allergies, current medications, past family history, past medical history, past social history, past surgical history and problem list       Review of Systems   Constitutional: Negative  HENT: Negative  Eyes: Negative  Respiratory: Negative  Cardiovascular: Positive for chest pain  Gastrointestinal: Negative  Endocrine: Negative  Genitourinary: Negative  Musculoskeletal: Positive for back pain  Skin: Negative  Allergic/Immunologic: Negative  Neurological: Negative  Hematological: Negative  Psychiatric/Behavioral: Negative  Objective:      BMI Counseling: Body mass index is 28 55 kg/m²  The BMI is above normal  Nutrition recommendations include decreasing portion sizes   Exercise recommendations include exercising 3-5 times per week  Rationale for BMI follow-up plan is due to patient being overweight or obese  BP 90/60   Pulse 69   Temp (!) 96 2 °F (35 7 °C)   Ht 5' 10" (1 778 m)   Wt 90 3 kg (199 lb)   SpO2 98%   BMI 28 55 kg/m²          Physical Exam  Vitals and nursing note reviewed  Constitutional:       General: He is not in acute distress  Appearance: Normal appearance  He is not ill-appearing, toxic-appearing or diaphoretic  HENT:      Head: Normocephalic and atraumatic  Right Ear: Tympanic membrane, ear canal and external ear normal  There is no impacted cerumen  Left Ear: Tympanic membrane, ear canal and external ear normal  There is no impacted cerumen  Nose: Nose normal  No congestion or rhinorrhea  Eyes:      General: No scleral icterus  Right eye: No discharge  Left eye: No discharge  Extraocular Movements: Extraocular movements intact  Conjunctiva/sclera: Conjunctivae normal       Pupils: Pupils are equal, round, and reactive to light  Neck:      Vascular: No carotid bruit  Cardiovascular:      Rate and Rhythm: Normal rate and regular rhythm  Pulses: Normal pulses  Heart sounds: Normal heart sounds  No murmur heard  No friction rub  No gallop  Pulmonary:      Effort: Pulmonary effort is normal  No respiratory distress  Breath sounds: Normal breath sounds  No stridor  No wheezing, rhonchi or rales  Chest:      Chest wall: No tenderness  Musculoskeletal:         General: Tenderness present  No swelling, deformity or signs of injury  Cervical back: Normal range of motion and neck supple  No rigidity  No muscular tenderness  Right lower leg: No edema  Left lower leg: No edema  Lymphadenopathy:      Cervical: No cervical adenopathy  Skin:     General: Skin is warm and dry  Capillary Refill: Capillary refill takes less than 2 seconds        Coloration: Skin is not jaundiced  Findings: No bruising, erythema, lesion or rash  Neurological:      Mental Status: He is alert and oriented to person, place, and time  Mental status is at baseline  Cranial Nerves: No cranial nerve deficit  Sensory: No sensory deficit  Motor: No weakness  Coordination: Coordination normal       Gait: Gait normal    Psychiatric:         Mood and Affect: Mood normal          Behavior: Behavior normal          Thought Content:  Thought content normal          Judgment: Judgment normal

## 2022-06-06 ENCOUNTER — OFFICE VISIT (OUTPATIENT)
Dept: FAMILY MEDICINE CLINIC | Facility: CLINIC | Age: 62
End: 2022-06-06
Payer: COMMERCIAL

## 2022-06-06 VITALS
SYSTOLIC BLOOD PRESSURE: 132 MMHG | DIASTOLIC BLOOD PRESSURE: 86 MMHG | BODY MASS INDEX: 28.46 KG/M2 | TEMPERATURE: 99 F | HEIGHT: 70 IN | WEIGHT: 198.8 LBS

## 2022-06-06 DIAGNOSIS — G89.29 CHRONIC PAIN OF BOTH KNEES: Primary | ICD-10-CM

## 2022-06-06 DIAGNOSIS — M25.561 CHRONIC PAIN OF BOTH KNEES: Primary | ICD-10-CM

## 2022-06-06 DIAGNOSIS — M10.00 IDIOPATHIC GOUT, UNSPECIFIED CHRONICITY, UNSPECIFIED SITE: ICD-10-CM

## 2022-06-06 DIAGNOSIS — D49.2 SKIN NEOPLASM: ICD-10-CM

## 2022-06-06 DIAGNOSIS — F41.9 ANXIETY: ICD-10-CM

## 2022-06-06 DIAGNOSIS — M25.562 CHRONIC PAIN OF BOTH KNEES: Primary | ICD-10-CM

## 2022-06-06 DIAGNOSIS — S22.42XD CLOSED FRACTURE OF MULTIPLE RIBS OF LEFT SIDE WITH ROUTINE HEALING, SUBSEQUENT ENCOUNTER: ICD-10-CM

## 2022-06-06 DIAGNOSIS — K21.00 GASTROESOPHAGEAL REFLUX DISEASE WITH ESOPHAGITIS WITHOUT HEMORRHAGE: ICD-10-CM

## 2022-06-06 PROCEDURE — 3008F BODY MASS INDEX DOCD: CPT | Performed by: FAMILY MEDICINE

## 2022-06-06 PROCEDURE — 1036F TOBACCO NON-USER: CPT | Performed by: FAMILY MEDICINE

## 2022-06-06 PROCEDURE — 99214 OFFICE O/P EST MOD 30 MIN: CPT | Performed by: FAMILY MEDICINE

## 2022-06-06 RX ORDER — ALLOPURINOL 300 MG/1
300 TABLET ORAL EVERY OTHER DAY
Qty: 45 TABLET | Refills: 0 | OUTPATIENT
Start: 2022-06-06

## 2022-06-06 RX ORDER — ALLOPURINOL 300 MG/1
300 TABLET ORAL EVERY OTHER DAY
Qty: 45 TABLET | Refills: 3 | Status: SHIPPED | OUTPATIENT
Start: 2022-06-06

## 2022-06-06 NOTE — PROGRESS NOTES
Assessment/Plan:  BMP CBC magnesium phosphorus reviewed from January 2022  Patient will continue with current regimen for gout/hyperuricemic state  Refills given at this time  Rib fractures are improving overall  Patient will follow-up with orthopedics regarding left knee pain  Patient will continue with Protonix for GERD which is stable  Refills will be given when needed  Patient will continue with Lexapro and Ativan as needed for anxiety  Note for patient to remain out of work for the next month  Follow-up in 3 months  Patient will schedule appointment in the next 1-2 weeks for removal of skin lesion on right forearm  Diagnoses and all orders for this visit:    Chronic pain of both knees    Idiopathic gout, unspecified chronicity, unspecified site  -     allopurinol (ZYLOPRIM) 300 mg tablet; Take 1 tablet (300 mg total) by mouth every other day    Closed fracture of multiple ribs of left side with routine healing, subsequent encounter    Skin neoplasm    Gastroesophageal reflux disease with esophagitis without hemorrhage            Subjective:        Patient ID: Zaire Chan is a 62 y o  male  Patient is here to follow-up on hyperuricemia/gout, rib fractures on the left GERD and bilateral knee pain worse on the left  Patient is having some left knee pain at this time  No recent trauma  Patient does see Orthopedics in the past   Patient has had injections  Rib pain has improved overall  No gout attacks  Reflux stable overall  Patient with new skin lesion on right form over the past month and a half  Occasional bleeding noted  Mood is stable overall  Some anxiety still noted by patient  The following portions of the patient's history were reviewed and updated as appropriate: allergies, current medications, past family history, past medical history, past social history, past surgical history and problem list       Review of Systems   Constitutional: Negative  HENT: Negative  Eyes: Negative  Respiratory: Negative  Cardiovascular: Negative  Gastrointestinal: Negative  Endocrine: Negative  Genitourinary: Negative  Musculoskeletal: Positive for arthralgias  Skin: Positive for color change  Allergic/Immunologic: Negative  Neurological: Negative  Hematological: Negative  Psychiatric/Behavioral: The patient is nervous/anxious  Objective:        Depression Screening and Follow-up Plan: Patient assessed for underlying major depression  Brief counseling provided and recommend additional follow-up/re-evaluation next office visit  /86 (BP Location: Right arm, Patient Position: Sitting, Cuff Size: Standard)   Temp 99 °F (37 2 °C) (Temporal)   Ht 5' 10" (1 778 m)   Wt 90 2 kg (198 lb 12 8 oz)   BMI 28 52 kg/m²          Physical Exam  Vitals and nursing note reviewed  Constitutional:       General: He is not in acute distress  Appearance: Normal appearance  He is not ill-appearing, toxic-appearing or diaphoretic  HENT:      Head: Normocephalic and atraumatic  Right Ear: Tympanic membrane, ear canal and external ear normal  There is no impacted cerumen  Left Ear: Tympanic membrane, ear canal and external ear normal  There is no impacted cerumen  Nose: Nose normal  No congestion or rhinorrhea  Mouth/Throat:      Mouth: Mucous membranes are moist       Pharynx: No oropharyngeal exudate or posterior oropharyngeal erythema  Eyes:      General: No scleral icterus  Right eye: No discharge  Left eye: No discharge  Extraocular Movements: Extraocular movements intact  Conjunctiva/sclera: Conjunctivae normal       Pupils: Pupils are equal, round, and reactive to light  Neck:      Vascular: No carotid bruit  Cardiovascular:      Rate and Rhythm: Normal rate and regular rhythm  Pulses: Normal pulses  Heart sounds: Normal heart sounds  No murmur heard  No friction rub  No gallop  Pulmonary:      Effort: Pulmonary effort is normal  No respiratory distress  Breath sounds: Normal breath sounds  No stridor  No wheezing, rhonchi or rales  Chest:      Chest wall: No tenderness  Musculoskeletal:         General: No swelling, tenderness, deformity or signs of injury  Normal range of motion  Cervical back: Normal range of motion and neck supple  No rigidity  No muscular tenderness  Right lower leg: No edema  Left lower leg: No edema  Lymphadenopathy:      Cervical: No cervical adenopathy  Skin:     General: Skin is warm and dry  Capillary Refill: Capillary refill takes less than 2 seconds  Coloration: Skin is not jaundiced  Findings: Lesion present  No bruising, erythema or rash  Comments: Irregular skin lesion on right forearm   Neurological:      Mental Status: He is alert and oriented to person, place, and time  Mental status is at baseline  Cranial Nerves: No cranial nerve deficit  Sensory: No sensory deficit  Motor: No weakness  Coordination: Coordination normal       Gait: Gait normal    Psychiatric:         Behavior: Behavior normal          Thought Content:  Thought content normal          Judgment: Judgment normal       Comments: Mildly anxious

## 2022-06-24 ENCOUNTER — PROCEDURE VISIT (OUTPATIENT)
Dept: FAMILY MEDICINE CLINIC | Facility: CLINIC | Age: 62
End: 2022-06-24
Payer: COMMERCIAL

## 2022-06-24 DIAGNOSIS — D49.2 SKIN NEOPLASM: Primary | ICD-10-CM

## 2022-06-24 PROCEDURE — 88305 TISSUE EXAM BY PATHOLOGIST: CPT | Performed by: PATHOLOGY

## 2022-06-24 PROCEDURE — 11301 SHAVE SKIN LESION 0.6-1.0 CM: CPT | Performed by: FAMILY MEDICINE

## 2022-06-24 NOTE — PROGRESS NOTES
Assessment/Plan:  See procedure note  Patient will keep area clean and dry and use bacitracin daily  Follow-up in 2 weeks       Diagnoses and all orders for this visit:    Skin neoplasm    Other orders  -     Shave lesion            Subjective:        Patient ID: Amy Marie is a 65 y o  male  Patient is here for removal of skin neoplasm from right forearm  Patient has notice change in size and shape  The following portions of the patient's history were reviewed and updated as appropriate: allergies, current medications, past family history, past medical history, past social history, past surgical history and problem list       Review of Systems   Skin: Positive for color change  Objective:        Depression Screening and Follow-up Plan: Patient assessed for underlying major depression  Brief counseling provided and recommend additional follow-up/re-evaluation next office visit  There were no vitals taken for this visit  Physical Exam  Skin:     Comments: Irregular raised 7 mm nodule right form         Shave lesion    Date/Time: 6/24/2022 1:53 PM  Performed by: David Anders DO  Authorized by: David Anders DO   [facility] Protocol:  Consent: Verbal consent obtained  Written consent obtained  Risks and benefits: risks, benefits and alternatives were discussed  Consent given by: patient  Time out: Immediately prior to procedure a "time out" was called to verify the correct patient, procedure, equipment, support staff and site/side marked as required    Patient understanding: patient states understanding of the procedure being performed  Patient consent: the patient's understanding of the procedure matches consent given  Procedure consent: procedure consent matches procedure scheduled  Relevant documents: relevant documents present and verified  Patient identity confirmed: verbally with patient      Number of Lesions: 1  Lesion 1:     Body area: upper extremity    Upper Retinal tear and detachment warning symptoms reviewed and patient instructed to call immediately if increasing floaters, flashes, or decreasing peripheral vision. extremity location: R lower arm    Initial size (mm): 7    Final defect size (mm): 8    Malignancy: malignancy unknown      Destruction method: shave removal      Comments:  Informed consent obtained  Betadine alcohol use  1 5 cc lidocaine with epinephrine use  Shave excision done this time  Electrodesiccation to the base  Specimen sent to pathology  Patient tolerated procedure well

## 2022-07-11 DIAGNOSIS — C44.622 SQUAMOUS CELL CANCER OF SKIN OF FOREARM, RIGHT: Primary | ICD-10-CM

## 2022-07-11 NOTE — RESULT ENCOUNTER NOTE
Sent referral to derm for pt    Pt would like to have moh's done, so I sent referral to Dr Radha Donovan

## 2022-07-13 ENCOUNTER — TELEPHONE (OUTPATIENT)
Dept: FAMILY MEDICINE CLINIC | Facility: CLINIC | Age: 62
End: 2022-07-13

## 2022-07-13 NOTE — TELEPHONE ENCOUNTER
Patient called about the referral I left a message stating that we can mail it or he can come pick it up at the office

## 2022-07-14 ENCOUNTER — TELEPHONE (OUTPATIENT)
Dept: FAMILY MEDICINE CLINIC | Facility: CLINIC | Age: 62
End: 2022-07-14

## 2022-07-14 NOTE — TELEPHONE ENCOUNTER
Pt would like the actual name of provider for derm that  you recommended , so they can call themselves to make appointment ,  They did not receive from anyone yet to schedule an appt

## 2022-07-15 NOTE — TELEPHONE ENCOUNTER
Dr Clover Lord office is closed until 7/18/22  I left a message @ Dr Clover Lord office to call so we can schedule Lionel Yu for an appt  I also have a referral into Jono Matta for an appt also  Lionel Yu is aware of this    He is in [place] right now and will not be back in town until 8/1/22

## 2022-07-22 ENCOUNTER — TELEPHONE (OUTPATIENT)
Dept: FAMILY MEDICINE CLINIC | Facility: CLINIC | Age: 62
End: 2022-07-22

## 2022-07-22 DIAGNOSIS — C44.621: Primary | ICD-10-CM

## 2022-07-22 NOTE — TELEPHONE ENCOUNTER
"Text page to FLORI Ulrich,\"Pt wants to talk to Dr. Daley if possible. Requesting different muscle relaxer, not valium. Ok to d/c MARY ocampo and IV fluids? Thanks!\"  " Patient called in regards to needing to see dermatology and no one has yet to reach out to him  Dr Guerrero office was closed for 2 weeks I did call and leave a message another referral was sent to Raul Prado and Arielle and patient has not heard from that office as well patient would like to know if there are any other Dr's you recommend, and that will do the testing each visit?

## 2022-07-27 ENCOUNTER — TELEPHONE (OUTPATIENT)
Dept: DERMATOLOGY | Facility: CLINIC | Age: 62
End: 2022-07-27

## 2022-07-27 NOTE — TELEPHONE ENCOUNTER
Pt is calling to schedule excision based upon the biopsy completed by physician and result is SCC on arm  Please review (I see no pictures in pt chart) and contact pt for MOHS  Thank you!

## 2022-07-29 NOTE — TELEPHONE ENCOUNTER
08/01/2022: Telephone call to patient, In regards scheduling MOHS/Excision  Left voice message to patient to please return my call at there earliest convenience  Telephone call to patient, In regards scheduling MOHS/Excision   Left voice message to patient to please return my call at there earliest convenience

## 2022-08-02 NOTE — RESULT NOTES
Verified Results  * CT CHEST WO CONTRAST [id number] 02:39PM Chanelle Hussein Order Number: [id number]    - Patient Instructions: To schedule this appointment, please contact Central Scheduling at [phone]  Test Name Result Flag Reference   CT CHEST WO CONTRAST (Report)     CT CHEST WITHOUT IV CONTRAST     INDICATION: Intermittent chest pain and tightness  History of GERD  COMPARISON: None  TECHNIQUE: CT examination of the chest was performed without intravenous contrast  Reformatted images were created in axial, sagittal, and coronal planes  Radiation dose length product (DLP) for this visit: 34 33 96 mGy-cm   This examination, like all CT scans performed in the [facility], was performed utilizing techniques to minimize radiation dose exposure, including the use of iterative    reconstruction and automated exposure control  FINDINGS:     LUNGS: Lungs are clear  There is no tracheal or endobronchial lesion  PLEURA: Unremarkable  HEART/GREAT VESSELS: Unremarkable for patient's age  MEDIASTINUM AND AKILA: Unremarkable  CHEST WALL AND LOWER NECK: Unremarkable  VISUALIZED STRUCTURES IN THE UPPER ABDOMEN: Splenomegaly measuring 14 9 cm  Left renal parapelvic cysts  Visualized portions of the upper abdomen are otherwise unremarkable  OSSEOUS STRUCTURES: No acute fracture  No destructive osseous lesion  IMPRESSION:     Lungs are clear  Splenomegaly  Workstation performed: [id number]     Signed by:    Terrence Mckinnon MD   7/13/17 Complex Repair And M Plasty Text: The defect edges were debeveled with a #15 scalpel blade.  The primary defect was closed partially with a complex linear closure.  Given the location of the remaining defect, shape of the defect and the proximity to free margins an M plasty was deemed most appropriate for complete closure of the defect.  Using a sterile surgical marker, an appropriate advancement flap was drawn incorporating the defect and placing the expected incisions within the relaxed skin tension lines where possible.    The area thus outlined was incised deep to adipose tissue with a #15 scalpel blade.  The skin margins were undermined to an appropriate distance in all directions utilizing iris scissors.

## 2022-08-08 ENCOUNTER — PROCEDURE VISIT (OUTPATIENT)
Dept: DERMATOLOGY | Facility: CLINIC | Age: 62
End: 2022-08-08
Payer: COMMERCIAL

## 2022-08-08 VITALS — HEIGHT: 70 IN | BODY MASS INDEX: 28.59 KG/M2 | WEIGHT: 199.7 LBS | TEMPERATURE: 97.7 F

## 2022-08-08 DIAGNOSIS — C44.621: Primary | ICD-10-CM

## 2022-08-08 PROCEDURE — 12032 INTMD RPR S/A/T/EXT 2.6-7.5: CPT | Performed by: DERMATOLOGY

## 2022-08-08 PROCEDURE — 88305 TISSUE EXAM BY PATHOLOGIST: CPT | Performed by: PATHOLOGY

## 2022-08-08 PROCEDURE — 11602 EXC TR-EXT MAL+MARG 1.1-2 CM: CPT | Performed by: DERMATOLOGY

## 2022-08-08 NOTE — PATIENT INSTRUCTIONS
POSTOP DISCUSSION DISCUSSION AND INSTRUCTIONS FOR PATIENT      Rationale for Procedure  A skin excision allows the dermatologist to remove a lesion  The procedure involves a local numbing medication and removing the entire lesion  Typically, the lesion is being removed because it is atypical, traumatized, or for significant appearance reasons  The area will be open like a brush burn and allowed to heal    There will be no sutures  Tissue is sent to pathologist who will reconfirm diagnosis and verify completeness of lesion removal     Description of Procedure  We would like to perform a skin excision today  A local anesthetic, similar to the kind that a dentist uses when filling a cavity, will be injected with a very small needle into the skin area to be sampled  The injected skin and tissue underneath should go to sleep and become numbed so that no further pain should be felt  A scalpel will be used to cut around the lesion and tissue will be submitted to pathology for examination  Depending on the diagnosis the lesion will be excised with a certain amount of normal skin to help assure completeness of lesion removal   The physician will discuss in advance the anticipated size and extent of removal    Bleeding will occur, but it will stopped with direct pressure, sutures, and electrocautery  Surgical Vaseline-type ointment will also applied after the procedure to help create a barrier between the wound and the outside world  Risks and Potential Complications  The advantage of a skin excision is that it allows us to remove a problem lesion quickly  Although this usually permits the lesion to heal as soon as possible with the least scarring, there are some risks and potential complications that include but are not limited to the following:  Some bleeding is normal at time of procedure and some bleeding on gauze is normal  the first few days after surgery    Profuse bleeding and bleeding with swelling and pain should be reported as detailed  below  Infection is uncommon in skin surgery  Infection should be reported and is indicated by pain, redness, and discharge of purulent material   Some dull to at time sharp pain could occur initially the day after surgery  Persistent pain or increasing pain days after surgery is not expected and should be reported  Every effort is made to minimize scar, but location, size, and genetics do play a role in scar appearance  A surgical wound does not achieve its optimal appearance until 6 months  There are several treatments available if scarring would be problematic including scar creams, silicone pad, laser and scar revision  Skin discoloration can occur especially in people of color  Its important to avoid sun on wound in first 6 months after surgery  Treatment is available if pigment is problematic  Incomplete removal of the lesion or recurrence of lesion can occur and this would then require further treatment and more surgery  Nerve Damage/Numbness/Loss of Function is very rare, but is most likely to occur if lesion is large or if it is in a high risk location  Allergic Reaction to lidocaine is rare  More commonly,  epinephrine is used  with the lidocaine  Occasionally, epinephrine (aka adrenalin) may cause a brief  feeling of anxiety or jitteriness  The person at the microscope  (pathologist) may provide additional information that was unexpected  This unexpected finding could provoke the need for additional treatment or evaluation  What You Will Need to Do After the Procedure  Keep the area clean and dry the first day  Try NOT to remove the bandage for the first day  Gently clean the area with soap and water and apply Vaseline ointment (this is over the counter and not a prescription) to the excision  site for up to 2 weeks  Apply a clean appropriately sized bandage to area  Gauze and paper tape are recommended for sensitive skin    Return for suture removal as instructed (generally 1 week for the face, 2 weeks for the body)  Take Acetaminophen (Tylenol) for discomfort, if no contraindications  Do NOT take Ibuprofen or aspirin unless specifically told to do so by your Dermatologist because these medications can make bleeding worse  Call our office immediately for signs of infection: fever, chills, increased redness, warmth, tenderness, discomfort/pain, or pus or foul smell coming from the wound  If bleeding is noticed, place a clean cloth over the area and apply firm pressure for thirty minutes  Check the wound ONLY after 30 minutes of direct pressure; do not cheat and sneak a peak, as that does not count  If bleeding persists after 30 minutes of legitimate direct pressure, then try one more round of direct pressure for an additional 10 minutes to the area  Should the bleeding become heavier or not stop after the second attempt, call [facility] directly at [phone] (SKIN) or, if after hours, go to your local Emergency Room/Emergency Department

## 2022-08-08 NOTE — PROGRESS NOTES
Result Note    Component    Case Report   Surgical Pathology Report                         Case: [id number]                                    Authorizing Provider: Bonny Carter DO           Collected:           06/24/2022 [facility]               Ordering Location:     [address]    Received:            06/24/2022 [facility]                                                                Pathologist:           Fátima Ruggiero MD                                                              Specimen:    Lesion, right forearm                                                                      Final Diagnosis   A  Skin, right forearm, shave biopsy:  Atypical endophytic well differentiated squamous proliferation  See note         Note:  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 or appropriate performing facility, though some tests may be performed on tissues which have not been validated for performance characteristics (such as staining performed on alcohol exposed cell blocks and decalcified tissues)   Results should be interpreted with caution and in the context of the patients clinical condition  These tests may not be cleared or approved by the [place]  Food and Drug Administration, though the FDA has determined that such clearance or approval is not necessary  These tests are used for clinical purposes and they should not be regarded as investigational or for research   This laboratory has been approved by CLIA 88, designated as a high-complexity laboratory and is qualified to perform these tests  Aaron Albino Description       A  The specimen is received in formalin, labeled with the patient's name and hospital number, and is designated "right forearm lesion  The specimen consists of a 1 x 0 7 cm shave of tan skin excised to depth of 0 1 cm  The epithelial surface is hair-bearing, tan pink, crusted papule  The skin surface is inked red and the margin of resection is inked green  The specimen is bisected  Entirely submitted  One cassette  Between sponges    Note: The estimated total formalin fixation time based upon information provided by the submitting clinician and the standard processing schedule is under 72 hours  Marshal          PROCEDURE:  EXCISION WITH INTERMEDIATE LAYERED CLOSURE     Attending: Cr Cooper  Assistant: Ja Yañez Diagnosis:  Squamous cell carcinoma with keratoacanthoma features  Post-Op Diagnosis: Same   Benign versus Malignant Malignant       Lesion Anatomic Location: right forearm  (Previous Accession Number: [id number]  Pre-op size: 1 cm x 0 6 cm  Size of defect:  2 0 cm x 1 6 cm  (with 0 5 centimeter margins)  Final repaired wound length:  4 5 cm    Written and verbal, witnessed informed consent was obtained  I discussed that excision is a method of removing lesions both benign and malignant lesions  A portion of normal skin is often taken to ensure completeness of removal   I reviewed that procedure will include numbing the area,  cutting around and under defect, undermining tissue, and closing the wound with sutures both inside and out  These sutures are usually removed in 7 to 14 days  Risks (bleeding, pain, infection, scarring, recurrence) and benefits discussed  It was discussed with patient that every effort is made to minimize scar, but scarring is influenced also by extrinsic factor such as location, age and genetics  Time Out: performed:  yes  Correct patient: yes     Correct site per Clinic Report: yes  Correct site per Patient Report: yes    LOCAL ANESTHESIA: 1% xylocaine with epi     DESCRIPTION OF PROCEDURE: The patient was brought back into the procedure room, anesthetized locally, prepped and draped in the usual fashion  Using a #15 blade with a scalpel, the lesion was excised in elliptical fashion  The wound was  undermined in the  fascial plane  Hemostasis was achieved with light electrocoagulation  Purpose of undermining was to decrease wound tension and facilitate closure  The wound was closed with subcutaneous sutures as follows:    Deep suture:4-0 Vicryl      Epidermal edge closure was accomplished with superficial sutures as follows:    Superficial suture: 4-0 Prolene  Superficial suture type: interrupted     Estimated blood loss less than 3ml  The patient tolerated the procedure well without any complications  The wound was cleaned with sterile saline, dried off, surgical vaseline ointment was applied, and the wound was covered  A pressure dressing was applied for stabilization and light pressure  The patient was given detailed oral and written instructions on postoperative care as detailed in consent  The patient left in good medical condition  POSTOP DISCUSSION DISCUSSION AND INSTRUCTIONS FOR PATIENT      Rationale for Procedure  A skin excision allows the dermatologist to remove a lesion  The procedure involves a local numbing medication and removing the entire lesion  Typically, the lesion is being removed because it is atypical, traumatized, or for significant appearance reasons  The area will be open like a brush burn and allowed to heal    There will be no sutures  Tissue is sent to pathologist who will reconfirm diagnosis and verify completeness of lesion removal     Description of Procedure  We would like to perform a skin excision today  A local anesthetic, similar to the kind that a dentist uses when filling a cavity, will be injected with a very small needle into the skin area to be sampled    The injected skin and tissue underneath should go to sleep and become numbed so that no further pain should be felt  A scalpel will be used to cut around the lesion and tissue will be submitted to pathology for examination  Depending on the diagnosis the lesion will be excised with a certain amount of normal skin to help assure completeness of lesion removal   The physician will discuss in advance the anticipated size and extent of removal    Bleeding will occur, but it will stopped with direct pressure, sutures, and electrocautery  Surgical Vaseline-type ointment will also applied after the procedure to help create a barrier between the wound and the outside world  Risks and Potential Complications  The advantage of a skin excision is that it allows us to remove a problem lesion quickly  Although this usually permits the lesion to heal as soon as possible with the least scarring, there are some risks and potential complications that include but are not limited to the following:  - Some bleeding is normal at time of procedure and some bleeding on gauze is normal  the first few days after surgery  Profuse bleeding and bleeding with swelling and pain should be reported as detailed  below  - Infection is uncommon in skin surgery  Infection should be reported and is indicated by pain, redness, and discharge of purulent material   - Some dull to at time sharp pain could occur initially the day after surgery  Persistent pain or increasing pain days after surgery is not expected and should be reported  - Every effort is made to minimize scar, but location, size, and genetics do play a role in scar appearance  A surgical wound does not achieve its optimal appearance until 6 months  There are several treatments available if scarring would be problematic including scar creams, silicone pad, laser and scar revision   - Skin discoloration can occur especially in people of color    Its important to avoid sun on wound in first 6 months after surgery  Treatment is available if pigment is problematic   - Incomplete removal of the lesion or recurrence of lesion can occur and this would then require further treatment and more surgery   - Nerve Damage/Numbness/Loss of Function is very rare, but is most likely to occur if lesion is large or if it is in a high risk location  - Allergic Reaction to lidocaine is rare  More commonly,  epinephrine is used  with the lidocaine  Occasionally, epinephrine (aka adrenalin) may cause a brief  feeling of anxiety or jitteriness  - The person at the microscope  (pathologist) may provide additional information that was unexpected  This unexpected finding could provoke the need for additional treatment or evaluation  What You Will Need to Do After the Procedure  1  Keep the area clean and dry the first day  Try NOT to remove the bandage for the first day  2  Gently clean the area with soap and water and apply Vaseline ointment (this is over the counter and not a prescription) to the excision  site for up to 2 weeks  3  Apply a clean appropriately sized bandage to area  Gauze and paper tape are recommended for sensitive skin  4  Return for suture removal as instructed (generally 1 week for the face, 2 weeks for the body)  5  Take Acetaminophen (Tylenol) for discomfort, if no contraindications  Do NOT take Ibuprofen or aspirin unless specifically told to do so by your Dermatologist because these medications can make bleeding worse  6  Call our office immediately for signs of infection: fever, chills, increased redness, warmth, tenderness, discomfort/pain, or pus or foul smell coming from the wound  If bleeding is noticed, place a clean cloth over the area and apply firm pressure for thirty minutes  Check the wound ONLY after 30 minutes of direct pressure; do not cheat and sneak a peak, as that does not count    If bleeding persists after 30 minutes of legitimate direct pressure, then try one more round of direct pressure for an additional 10 minutes to the area  Should the bleeding become heavier or not stop after the second attempt, call [facility] directly at [phone] (SKIN) or, if after hours, go to your local Emergency Room/Emergency Department  SCC excised with 0 5cm margin for 2cm excision and 4 5cm closure  Well tolerated  S/R in 2 weeks      Scribe Attestation    I,:  Krunal Quispe am acting as a scribe while in the presence of the attending physician :       I,:  Nii Tyler MD personally performed the services described in this documentation    as scribed in my presence :

## 2022-08-22 ENCOUNTER — OFFICE VISIT (OUTPATIENT)
Dept: DERMATOLOGY | Facility: CLINIC | Age: 62
End: 2022-08-22

## 2022-08-22 DIAGNOSIS — Z48.02 ENCOUNTER FOR REMOVAL OF SUTURES: Primary | ICD-10-CM

## 2022-08-22 PROCEDURE — RECHECK: Performed by: STUDENT IN AN ORGANIZED HEALTH CARE EDUCATION/TRAINING PROGRAM

## 2022-08-22 NOTE — PROGRESS NOTES
Suture removal    Date/Time: 8/22/2022 1:45 PM  Performed by: Cesar Berman MA  Authorized by: Lelo Gallardo MD   Universal Protocol:  Consent: Verbal consent obtained  Consent given by: patient  Timeout called at: 8/22/2022 1:45 PM   Patient understanding: patient states understanding of the procedure being performed  Patient consent: the patient's understanding of the procedure matches consent given  Procedure consent: procedure consent matches procedure scheduled  Patient identity confirmed: verbally with patient        Patient location:  Clinic  Location:     Laterality:  Right    Location: forearm  Procedure details: Tools used:  Suture removal kit    Wound appearance:  No sign(s) of infection, good wound healing and clean    Sutures removed: 8  Post-procedure details:     Post-removal:  Antibiotic ointment applied and Band-Aid applied    Patient tolerance of procedure:   Tolerated well, no immediate complications

## 2022-10-18 ENCOUNTER — TELEPHONE (OUTPATIENT)
Dept: CARDIAC SURGERY | Facility: CLINIC | Age: 62
End: 2022-10-18

## 2022-10-18 NOTE — TELEPHONE ENCOUNTER
Called patient and left message requesting a call back regarding insurance change to complete authorization for ECHO and Holter monitor ordered by Dr Diaz Luis

## 2022-10-28 ENCOUNTER — HOSPITAL ENCOUNTER (OUTPATIENT)
Dept: NON INVASIVE DIAGNOSTICS | Facility: CLINIC | Age: 62
Discharge: HOME | End: 2022-10-28
Payer: COMMERCIAL

## 2022-10-28 VITALS
DIASTOLIC BLOOD PRESSURE: 86 MMHG | SYSTOLIC BLOOD PRESSURE: 132 MMHG | WEIGHT: 199 LBS | HEIGHT: 70 IN | BODY MASS INDEX: 28.49 KG/M2 | HEART RATE: 50 BPM

## 2022-10-28 DIAGNOSIS — I42.8 NONISCHEMIC CARDIOMYOPATHY (HCC): ICD-10-CM

## 2022-10-28 DIAGNOSIS — I49.3 PVC (PREMATURE VENTRICULAR CONTRACTION): ICD-10-CM

## 2022-10-28 LAB
AORTIC ROOT: 3.8 CM
APICAL FOUR CHAMBER EJECTION FRACTION: 55 %
ASCENDING AORTA: 3.5 CM
E WAVE DECELERATION TIME: 166 MS
FRACTIONAL SHORTENING: 33 % (ref 28–44)
INTERVENTRICULAR SEPTUM IN DIASTOLE (PARASTERNAL SHORT AXIS VIEW): 0.9 CM
INTERVENTRICULAR SEPTUM: 0.9 CM (ref 0.6–1.1)
LAAS-AP2: 22.6 CM2
LAAS-AP4: 21.2 CM2
LEFT ATRIUM AREA SYSTOLE SINGLE PLANE A4C: 21.4 CM2
LEFT ATRIUM SIZE: 4.2 CM
LEFT INTERNAL DIMENSION IN SYSTOLE: 3.7 CM (ref 2.1–4)
LEFT VENTRICLE DIASTOLIC VOLUME (MOD BIPLANE): 151 ML
LEFT VENTRICLE SYSTOLIC VOLUME (MOD BIPLANE): 69 ML
LEFT VENTRICULAR INTERNAL DIMENSION IN DIASTOLE: 5.5 CM (ref 3.5–6)
LEFT VENTRICULAR POSTERIOR WALL IN END DIASTOLE: 1 CM
LEFT VENTRICULAR STROKE VOLUME: 93 ML
LV EF: 54 %
LVSV (TEICH): 93 ML
MV PEAK A VEL: 0.52 M/S
MV PEAK E VEL: 37 CM/S
MV STENOSIS PRESSURE HALF TIME: 48 MS
MV VALVE AREA P 1/2 METHOD: 4.58 CM2
RA PRESSURE ESTIMATED: 5 MMHG
RIGHT ATRIUM AREA SYSTOLE A4C: 18.2 CM2
RIGHT VENTRICLE ID DIMENSION: 4.2 CM
RV PSP: 30 MMHG
SL CV LEFT ATRIUM LENGTH A2C: 4.9 CM
SL CV LV EF: 55
SL CV PED ECHO LEFT VENTRICLE DIASTOLIC VOLUME (MOD BIPLANE) 2D: 151 ML
SL CV PED ECHO LEFT VENTRICLE SYSTOLIC VOLUME (MOD BIPLANE) 2D: 58 ML
TR MAX PG: 25 MMHG
TR PEAK VELOCITY: 2.5 M/S
TRICUSPID VALVE PEAK REGURGITATION VELOCITY: 2.49 M/S

## 2022-10-28 PROCEDURE — 93306 TTE W/DOPPLER COMPLETE: CPT | Performed by: INTERNAL MEDICINE

## 2022-10-28 PROCEDURE — 93225 XTRNL ECG REC<48 HRS REC: CPT

## 2022-10-28 PROCEDURE — 93226 XTRNL ECG REC<48 HR SCAN A/R: CPT

## 2022-10-28 PROCEDURE — 93306 TTE W/DOPPLER COMPLETE: CPT

## 2022-11-02 ENCOUNTER — OFFICE VISIT (OUTPATIENT)
Dept: CARDIAC SURGERY | Facility: CLINIC | Age: 62
End: 2022-11-02

## 2022-11-02 VITALS
SYSTOLIC BLOOD PRESSURE: 119 MMHG | OXYGEN SATURATION: 98 % | BODY MASS INDEX: 29.2 KG/M2 | HEART RATE: 68 BPM | DIASTOLIC BLOOD PRESSURE: 89 MMHG | WEIGHT: 204 LBS | HEIGHT: 70 IN

## 2022-11-02 DIAGNOSIS — I42.8 NONISCHEMIC CARDIOMYOPATHY (HCC): ICD-10-CM

## 2022-11-02 DIAGNOSIS — I49.3 PVC (PREMATURE VENTRICULAR CONTRACTION): Primary | ICD-10-CM

## 2022-11-02 NOTE — PROGRESS NOTES
Cardiology Follow Up Visit    Shawn Luo  1960  [id number]  [id number] [facility] [address] [id number] [phone]    1  PVC (premature ventricular contraction)  POCT ECG    Echo complete w/ contrast if indicated   2  Nonischemic cardiomyopathy (HCC)  Echo complete w/ contrast if indicated    Lipid panel         Discussion/Summary:    1  h/o Nonischemic cardiomyopathy, LVEF 48-55%, most recently normal by echo this month    2  PVCs, asymptomatic, holter <1% burden        Plan:  Patient continues to do well, no change in ejection fraction  PVC burden <1% holter this month  Encouraged daily activity/ exercise and continued Mediterranean low-salt diet  Spent 25 minutes of patient, rate in the 50% counseling, PVCs nonischemic cardiomyopathy    Interval History:     80-year-old male history of mild nonischemic cardiomyopathy  Ejection fraction slightly low in the upper 40 to low 50 range   Dates back many years underwent cardiac catheterization at [facility] years ago no coronary disease  Had an echocardiogram EF 52%, prior 40% by cardiac MRI at one point in years past  Recently 55% by echo this month  There is no evidence of scarring inflammation or infiltrative disease on his cardiac MRI     History of PVCs which were asymptomatic, holter this month <1% pvc burden  Unable to tolerate beta blockers, Ace inhibitors and prefers not using  No complaints      Patient Active Problem List   Diagnosis   • Chronic lumbar pain   • Acid reflux   • Esophageal reflux   • Low vitamin D level   • Gout   • URTI (acute upper respiratory infection)   • Influenza   • Visual disturbance   • Shoulder joint pain   • Epicondylitis, lateral, right   • Primary insomnia   • Well adult exam   • Precordial pain   • Sinus bradycardia   • PVC (premature ventricular contraction)   • Bronchitis   • History of cardiomyopathy   • Nonischemic cardiomyopathy (HCC)   • Lumbar pain   • Anxiety   • Chronic pain of both knees   • Continuous opioid dependence (HCC)   • Broken ribs   • Skin neoplasm     Past Medical History:   Diagnosis Date   • Anxiety    • BPH (benign prostatic hyperplasia)    • Broken ribs    • Elevated LDL cholesterol level 09/18/2018   • GERD (gastroesophageal reflux disease)    • Gout    • Herpes     simplex - lower lip    • Hx of splenomegaly     mild   • Hyperuricemia    • Idiopathic cardiomyopathy (HCC)    • Parapelvic renal cyst    • Sinus bradycardia      Social History     Socioeconomic History   • Marital status: /Civil Union     Spouse name: Not on file   • Number of children: Not on file   • Years of education: Not on file   • Highest education level: Not on file   Occupational History   • Not on file   Tobacco Use   • Smoking status: Never Smoker   • Smokeless tobacco: Never Used   Vaping Use   • Vaping Use: Never used   Substance and Sexual Activity   • Alcohol use: Yes     Comment: socially   • Drug use: No   • Sexual activity: Yes     Partners: Female     Birth control/protection: None   Other Topics Concern   • Not on file   Social History Narrative   • Not on file     Social Determinants of Health     Financial Resource Strain: Not on file   Food Insecurity: Not on file   Transportation Needs: Not on file   Physical Activity: Not on file   Stress: Not on file   Social Connections: Not on file   Intimate Partner Violence: Not on file   Housing Stability: Not on file      Family History   Problem Relation Age of Onset   • Cancer Family    • Diabetes Family         mellitus    • Heart disease Family    • Hypertension Family    • Parkinsonism Family    • Parkinsonism Mother    • Heart attack Father    • Kidney disease Father    • Diabetes Paternal Grandmother    • Arthritis Maternal Grandmother      Past Surgical History:   Procedure Laterality Date   • CARDIAC CATHETERIZATION  08/30/2013   • COLONOSCOPY     • ROTATOR CUFF REPAIR Right    • UMBILICAL HERNIA REPAIR     • WRIST SURGERY Left        Current Outpatient Medications:   •  allopurinol (ZYLOPRIM) 300 mg tablet, Take 1 tablet (300 mg total) by mouth every other day, Disp: 45 tablet, Rfl: 3  •  cholecalciferol (VITAMIN D3) 1,000 units tablet, Take by mouth daily , Disp: , Rfl:   •  escitalopram (LEXAPRO) 20 mg tablet, TAKE ONE TABLET BY MOUTH ONCE DAILY, Disp: 90 tablet, Rfl: 0  •  LORazepam (ATIVAN) 1 mg tablet, Take 1 tablet (1 mg total) by mouth as needed (as needed), Disp: 30 tablet, Rfl: 0  •  Multiple Vitamin (multivitamin) capsule, Take 1 capsule by mouth daily, Disp: , Rfl:   •  pantoprazole (PROTONIX) 40 mg tablet, TAKE ONE TABLET BY MOUTH TWICE DAILY , Disp: 180 tablet, Rfl: 0  •  methocarbamol (ROBAXIN) 500 mg tablet, , Disp: , Rfl:   No Known Allergies      Social, Family, Medication history reviewed and updated as necessary      Labs:     Lab Results   Component Value Date     02/11/2015    K 4 6 02/29/2020     (H) 02/29/2020    CO2 27 02/29/2020    BUN 18 02/29/2020    CREATININE 1 14 02/29/2020    CREATININE 0 99 08/12/2019    GLUCOSE 84 02/11/2015    CALCIUM 9 1 02/29/2020       Lab Results   Component Value Date    WBC 5 22 08/12/2019    HGB 14 3 08/12/2019    HGB 14 3 02/11/2015    HCT 43 2 08/12/2019    HCT 41 4 02/11/2015     08/12/2019     02/11/2015       Lab Results   Component Value Date    CHOL 161 02/11/2015    CHOL 179 09/03/2014     Lab Results   Component Value Date    HDL 56 08/12/2019    HDL 48 02/11/2015     Lab Results   Component Value Date    LDLCALC 92 08/12/2019    LDLCALC 81 02/11/2015     No results found for: LDLDIRECT          Lab Results   Component Value Date    TRIG 77 08/12/2019    TRIG 162 02/11/2015       Lab Results   Component Value Date    ALT 20 08/12/2019    ALT 24 02/11/2015    AST 8 08/12/2019    AST 13 02/11/2015       No results found for: INR    No results found for: NTBNP    No results found for: HGBA1C        Imaging: Reviewed in epic        Review of Systems:  14 systems reviewed and negative with exception of the above        PHYSICAL EXAM:      Vitals:    11/02/22 1004   BP: 119/89   Pulse: 68   SpO2: 98%     Body mass index is 29 27 kg/m²  Weight (last 2 days)     Date/Time Weight    11/02/22 1004 92 5 (204)            Gen: No acute distress  HEENT: anicteric, mucous membranes moist  Neck: supple, no jugular venous distention, or carotid bruit  Heart: regular, normal s1 and s2, no murmur/rub or gallop  Lungs :clear to auscultation bilaterally, no rales/rhonchi or wheeze  Abdomen: soft nontender, normoactive bowel sounds, no organomegaly  Ext: warm and perfused, normal femoral pulses, no edema, or clubbing  Skin: warm, no rashes  Neuro: AAO x 3, no focal findings  Psychiatric: normal affect  Musculoskeletal: no obvious joint deformities  This note was completed in part utilizing m-[employer] fluency direct voice recognition software  Grammatical errors, random word insertion, spelling mistakes, and incomplete sentences may be an occasional consequence of the system secondary to software limitations, ambient noise and hardware issues  At the time of dictation, efforts were made to edit, clarify and /or correct errors  Please read the chart carefully and recognize, using context, where substitutions have occurred  If you have any questions or concerns about the context, text or information contained within the body of this dictation, please contact myself, the provider, for further clarification

## 2022-12-01 DIAGNOSIS — R19.7 DIARRHEA, UNSPECIFIED TYPE: Primary | ICD-10-CM

## 2022-12-01 RX ORDER — METRONIDAZOLE 500 MG/1
500 TABLET ORAL 2 TIMES DAILY
Qty: 14 TABLET | Refills: 0 | Status: SHIPPED | OUTPATIENT
Start: 2022-12-01 | End: 2022-12-08

## 2022-12-01 NOTE — TELEPHONE ENCOUNTER
Pt c/o diarrhea x 6 d  No one else in the family is sick  He does have a puppy that was just dx with giardia    He wants to know what to do

## 2022-12-07 ENCOUNTER — OFFICE VISIT (OUTPATIENT)
Dept: FAMILY MEDICINE CLINIC | Facility: CLINIC | Age: 62
End: 2022-12-07

## 2022-12-07 VITALS
SYSTOLIC BLOOD PRESSURE: 124 MMHG | HEART RATE: 60 BPM | WEIGHT: 201.4 LBS | HEIGHT: 70 IN | TEMPERATURE: 98 F | RESPIRATION RATE: 16 BRPM | OXYGEN SATURATION: 99 % | DIASTOLIC BLOOD PRESSURE: 84 MMHG | BODY MASS INDEX: 28.83 KG/M2

## 2022-12-07 DIAGNOSIS — Z12.5 PROSTATE CANCER SCREENING: ICD-10-CM

## 2022-12-07 DIAGNOSIS — R00.1 SINUS BRADYCARDIA: ICD-10-CM

## 2022-12-07 DIAGNOSIS — R07.2 PRECORDIAL PAIN: ICD-10-CM

## 2022-12-07 DIAGNOSIS — Z00.00 WELL ADULT EXAM: Primary | ICD-10-CM

## 2022-12-07 NOTE — PROGRESS NOTES
Assessment/Plan: Previous laboratory studies including PSA reviewed at this time  Patient will go for PSA again for prostate cancer screening  Patient will have other laboratory studies for previous chronic conditions  Patient to see cardiology  Patient up-to-date with vaccines  Patient did have flu shot  Patient will think about Shingrix vaccine for the future  Colonoscopy up-to-date in 2020  This will need to be repeated in 10 years  Patient will continue with exercise and dietary modifications  Patient will follow-up in 6 months or as needed       Diagnoses and all orders for this visit:    Well adult exam    Prostate cancer screening  -     PSA, Total Screen; Future    Precordial pain  -     Comprehensive metabolic panel; Future  -     CBC and differential; Future  -     Lipid panel; Future  -     TSH, 3rd generation with Free T4 reflex; Future    Sinus bradycardia  -     Comprehensive metabolic panel; Future  -     CBC and differential; Future  -     Lipid panel; Future  -     TSH, 3rd generation with Free T4 reflex; Future            Subjective:        Patient ID: Lucio Padilla is a 65 y o  male  Patient is here for wellness exam   Labs and vaccines reviewed  Patient up-to-date with PSA testing  Patient did have colonoscopy in 2020  Repeat as needed in 10 years  Patient feeling well overall without any significant complaints at this time  The following portions of the patient's history were reviewed and updated as appropriate: allergies, current medications, past family history, past medical history, past social history, past surgical history and problem list       Review of Systems   Constitutional: Negative  HENT: Negative  Eyes: Negative  Respiratory: Negative  Cardiovascular: Negative  Gastrointestinal: Negative  Endocrine: Negative  Genitourinary: Negative  Musculoskeletal: Negative  Skin: Negative  Allergic/Immunologic: Negative      Neurological: Negative  Hematological: Negative  Psychiatric/Behavioral: Negative  Objective:        Depression Screening and Follow-up Plan: Patient was screened for depression during today's encounter  They screened negative with a PHQ-2 score of 0             /84 (BP Location: Right arm, Patient Position: Sitting, Cuff Size: Standard)   Pulse 60   Temp 98 °F (36 7 °C) (Tympanic)   Resp 16   Ht 5' 10" (1 778 m)   Wt 91 4 kg (201 lb 6 4 oz)   SpO2 99%   BMI 28 90 kg/m²          Physical Exam  Vitals and nursing note reviewed  Constitutional:       General: He is not in acute distress  Appearance: Normal appearance  He is not ill-appearing, toxic-appearing or diaphoretic  HENT:      Head: Normocephalic and atraumatic  Right Ear: Tympanic membrane, ear canal and external ear normal  There is no impacted cerumen  Left Ear: Tympanic membrane, ear canal and external ear normal  There is no impacted cerumen  Nose: Nose normal  No congestion or rhinorrhea  Mouth/Throat:      Mouth: Mucous membranes are moist       Pharynx: No oropharyngeal exudate or posterior oropharyngeal erythema  Eyes:      General: No scleral icterus  Right eye: No discharge  Left eye: No discharge  Extraocular Movements: Extraocular movements intact  Conjunctiva/sclera: Conjunctivae normal       Pupils: Pupils are equal, round, and reactive to light  Neck:      Vascular: No carotid bruit  Cardiovascular:      Rate and Rhythm: Normal rate and regular rhythm  Pulses: Normal pulses  Heart sounds: Normal heart sounds  No murmur heard  No friction rub  No gallop  Pulmonary:      Effort: Pulmonary effort is normal  No respiratory distress  Breath sounds: Normal breath sounds  No stridor  No wheezing, rhonchi or rales  Chest:      Chest wall: No tenderness  Musculoskeletal:         General: No swelling, tenderness, deformity or signs of injury   Normal range of motion  Cervical back: Normal range of motion and neck supple  No rigidity  No muscular tenderness  Right lower leg: No edema  Left lower leg: No edema  Lymphadenopathy:      Cervical: No cervical adenopathy  Skin:     General: Skin is warm and dry  Capillary Refill: Capillary refill takes less than 2 seconds  Coloration: Skin is not jaundiced  Findings: No bruising, erythema, lesion or rash  Neurological:      Mental Status: He is alert and oriented to person, place, and time  Mental status is at baseline  Cranial Nerves: No cranial nerve deficit  Sensory: No sensory deficit  Motor: No weakness  Coordination: Coordination normal       Gait: Gait normal    Psychiatric:         Mood and Affect: Mood normal          Behavior: Behavior normal          Thought Content:  Thought content normal          Judgment: Judgment normal

## 2022-12-12 DIAGNOSIS — F41.9 ANXIETY: ICD-10-CM

## 2022-12-12 DIAGNOSIS — K21.00 GASTROESOPHAGEAL REFLUX DISEASE WITH ESOPHAGITIS: ICD-10-CM

## 2022-12-12 DIAGNOSIS — F51.01 PRIMARY INSOMNIA: ICD-10-CM

## 2022-12-12 RX ORDER — PANTOPRAZOLE SODIUM 40 MG/1
TABLET, DELAYED RELEASE ORAL
Qty: 180 TABLET | Refills: 0 | Status: SHIPPED | OUTPATIENT
Start: 2022-12-12

## 2022-12-12 RX ORDER — ESCITALOPRAM OXALATE 20 MG/1
20 TABLET ORAL DAILY
Qty: 90 TABLET | Refills: 0 | Status: SHIPPED | OUTPATIENT
Start: 2022-12-12

## 2023-03-12 DIAGNOSIS — F51.01 PRIMARY INSOMNIA: ICD-10-CM

## 2023-03-12 DIAGNOSIS — K21.00 GASTROESOPHAGEAL REFLUX DISEASE WITH ESOPHAGITIS: ICD-10-CM

## 2023-03-12 DIAGNOSIS — F41.9 ANXIETY: ICD-10-CM

## 2023-03-13 RX ORDER — ESCITALOPRAM OXALATE 20 MG/1
TABLET ORAL
Qty: 90 TABLET | Refills: 0 | Status: SHIPPED | OUTPATIENT
Start: 2023-03-13

## 2023-03-13 RX ORDER — PANTOPRAZOLE SODIUM 40 MG/1
TABLET, DELAYED RELEASE ORAL
Qty: 180 TABLET | Refills: 0 | Status: SHIPPED | OUTPATIENT
Start: 2023-03-13

## 2023-06-14 DIAGNOSIS — K21.00 GASTROESOPHAGEAL REFLUX DISEASE WITH ESOPHAGITIS: ICD-10-CM

## 2023-06-14 DIAGNOSIS — F41.9 ANXIETY: ICD-10-CM

## 2023-06-14 DIAGNOSIS — F51.01 PRIMARY INSOMNIA: ICD-10-CM

## 2023-06-14 RX ORDER — ESCITALOPRAM OXALATE 20 MG/1
20 TABLET ORAL DAILY
Qty: 90 TABLET | Refills: 0 | Status: SHIPPED | OUTPATIENT
Start: 2023-06-14

## 2023-06-14 RX ORDER — PANTOPRAZOLE SODIUM 40 MG/1
TABLET, DELAYED RELEASE ORAL
Qty: 180 TABLET | Refills: 0 | Status: SHIPPED | OUTPATIENT
Start: 2023-06-14

## 2023-09-09 DIAGNOSIS — K21.00 GASTROESOPHAGEAL REFLUX DISEASE WITH ESOPHAGITIS: ICD-10-CM

## 2023-09-09 DIAGNOSIS — F51.01 PRIMARY INSOMNIA: ICD-10-CM

## 2023-09-09 DIAGNOSIS — F41.9 ANXIETY: ICD-10-CM

## 2023-09-11 RX ORDER — ESCITALOPRAM OXALATE 20 MG/1
20 TABLET ORAL DAILY
Qty: 90 TABLET | Refills: 0 | Status: SHIPPED | OUTPATIENT
Start: 2023-09-11

## 2023-09-11 RX ORDER — PANTOPRAZOLE SODIUM 40 MG/1
TABLET, DELAYED RELEASE ORAL
Qty: 180 TABLET | Refills: 0 | Status: SHIPPED | OUTPATIENT
Start: 2023-09-11

## 2023-09-30 ENCOUNTER — OFFICE VISIT (OUTPATIENT)
Dept: URGENT CARE | Facility: MEDICAL CENTER | Age: 63
End: 2023-09-30
Payer: COMMERCIAL

## 2023-09-30 VITALS
HEIGHT: 70 IN | WEIGHT: 200 LBS | SYSTOLIC BLOOD PRESSURE: 125 MMHG | RESPIRATION RATE: 20 BRPM | DIASTOLIC BLOOD PRESSURE: 77 MMHG | OXYGEN SATURATION: 97 % | TEMPERATURE: 98.6 F | BODY MASS INDEX: 28.63 KG/M2 | HEART RATE: 70 BPM

## 2023-09-30 DIAGNOSIS — J01.00 ACUTE MAXILLARY SINUSITIS, RECURRENCE NOT SPECIFIED: Primary | ICD-10-CM

## 2023-09-30 DIAGNOSIS — J40 BRONCHITIS: ICD-10-CM

## 2023-09-30 PROCEDURE — 99213 OFFICE O/P EST LOW 20 MIN: CPT | Performed by: PHYSICIAN ASSISTANT

## 2023-09-30 RX ORDER — FLUTICASONE PROPIONATE AND SALMETEROL 100; 50 UG/1; UG/1
1 POWDER RESPIRATORY (INHALATION) 2 TIMES DAILY
Qty: 60 BLISTER | Refills: 0 | Status: SHIPPED | OUTPATIENT
Start: 2023-09-30 | End: 2023-10-30

## 2023-09-30 RX ORDER — BENZONATATE 100 MG/1
100 CAPSULE ORAL 3 TIMES DAILY PRN
Qty: 20 CAPSULE | Refills: 0 | Status: SHIPPED | OUTPATIENT
Start: 2023-09-30 | End: 2023-10-05

## 2023-09-30 RX ORDER — DOXYCYCLINE 100 MG/1
100 TABLET ORAL 2 TIMES DAILY
Qty: 20 TABLET | Refills: 0 | Status: SHIPPED | OUTPATIENT
Start: 2023-09-30 | End: 2023-10-10

## 2023-09-30 NOTE — PROGRESS NOTES
[facility]        NAME: Crist Castleman is a 59 y.o. male  : 1960    MRN: [id number]  DATE: 2023  TIME: 11:16 AM    /77   Pulse 70   Temp 98.6 °F (37 °C)   Resp 20   Ht 5' 10" (1.778 m)   Wt 90.7 kg (200 lb)   SpO2 97%   BMI 28.70 kg/m²     Assessment and Plan   Acute maxillary sinusitis, recurrence not specified [J01.00]  1. Acute maxillary sinusitis, recurrence not specified  benzonatate (TESSALON PERLES) 100 mg capsule    doxycycline (ADOXA) 100 MG tablet    Fluticasone-Salmeterol (Advair Diskus) 100-50 mcg/dose inhaler      2. Bronchitis  benzonatate (TESSALON PERLES) 100 mg capsule    doxycycline (ADOXA) 100 MG tablet    Fluticasone-Salmeterol (Advair Diskus) 100-50 mcg/dose inhaler            Patient Instructions       Follow up with PCP in 3-5 days. Proceed to  ER if symptoms worsen. Chief Complaint     Chief Complaint   Patient presents with   • Cough     Patient states he has been not feeling well for a week. His grandson ios sick and recently started day care. Covid negative         History of Present Illness       Pt with 1 week productive cough and congestion, home covid test negative       Review of Systems   Review of Systems   Constitutional: Negative. HENT: Positive for congestion, sinus pressure and sinus pain. Eyes: Negative. Respiratory: Positive for cough. Cardiovascular: Negative. Gastrointestinal: Negative. Endocrine: Negative. Genitourinary: Negative. Musculoskeletal: Negative. Skin: Negative. Allergic/Immunologic: Negative. Neurological: Negative. Hematological: Negative. Psychiatric/Behavioral: Negative. All other systems reviewed and are negative.         Current Medications       Current Outpatient Medications:   •  allopurinol (ZYLOPRIM) 300 mg tablet, TAKE ONE TABLET BY MOUTH EVERY OTHER DAY, Disp: 45 tablet, Rfl: 1  •  benzonatate (TESSALON PERLES) 100 mg capsule, Take 1 capsule (100 mg total) by mouth 3 (three) times a day as needed for cough, Disp: 20 capsule, Rfl: 0  •  cholecalciferol (VITAMIN D3) 1,000 units tablet, Take by mouth daily , Disp: , Rfl:   •  doxycycline (ADOXA) 100 MG tablet, Take 1 tablet (100 mg total) by mouth 2 (two) times a day for 10 days, Disp: 20 tablet, Rfl: 0  •  escitalopram (LEXAPRO) 20 mg tablet, TAKE 1 TABLET BY MOUTH EVERY DAY, Disp: 90 tablet, Rfl: 0  •  Fluticasone-Salmeterol (Advair Diskus) 100-50 mcg/dose inhaler, Inhale 1 puff 2 (two) times a day Rinse mouth after use., Disp: 60 blister, Rfl: 0  •  LORazepam (ATIVAN) 1 mg tablet, Take 1 tablet (1 mg total) by mouth as needed (as needed), Disp: 30 tablet, Rfl: 0  •  Multiple Vitamin (multivitamin) capsule, Take 1 capsule by mouth daily, Disp: , Rfl:   •  pantoprazole (PROTONIX) 40 mg tablet, TAKE 1 TABLET BY MOUTH TWICE A DAY, Disp: 180 tablet, Rfl: 0    Current Allergies     Allergies as of 09/30/2023   • (No Known Allergies)            The following portions of the patient's history were reviewed and updated as appropriate: allergies, current medications, past family history, past medical history, past social history, past surgical history and problem list.     Past Medical History:   Diagnosis Date   • Anxiety    • BPH (benign prostatic hyperplasia)    • Broken ribs    • Elevated LDL cholesterol level 09/18/2018   • GERD (gastroesophageal reflux disease)    • Gout    • Herpes     simplex - lower lip    • Hx of splenomegaly     mild   • Hyperuricemia    • Idiopathic cardiomyopathy (HCC)    • Parapelvic renal cyst    • Sinus bradycardia        Past Surgical History:   Procedure Laterality Date   • CARDIAC CATHETERIZATION  08/30/2013   • COLONOSCOPY     • ROTATOR CUFF REPAIR Right    • UMBILICAL HERNIA REPAIR     • WRIST SURGERY Left        Family History   Problem Relation Age of Onset   • Cancer Family    • Diabetes Family         mellitus    • Heart disease Family    • Hypertension Family    • Parkinsonism Family    • Parkinsonism Mother    • Heart attack Father    • Kidney disease Father    • Diabetes Paternal Grandmother    • Arthritis Maternal Grandmother          Medications have been verified. Objective   /77   Pulse 70   Temp 98.6 °F (37 °C)   Resp 20   Ht 5' 10" (1.778 m)   Wt 90.7 kg (200 lb)   SpO2 97%   BMI 28.70 kg/m²        Physical Exam     Physical Exam  Vitals and nursing note reviewed. Constitutional:       Appearance: Normal appearance. He is normal weight. HENT:      Head: Normocephalic and atraumatic. Right Ear: Tympanic membrane, ear canal and external ear normal.      Left Ear: Tympanic membrane, ear canal and external ear normal.      Nose: Congestion and rhinorrhea present. Comments: Boggy mucosa  Yellow nasal d/c      Mouth/Throat:      Mouth: Mucous membranes are moist.      Pharynx: Oropharynx is clear. Eyes:      Extraocular Movements: Extraocular movements intact. Conjunctiva/sclera: Conjunctivae normal.      Pupils: Pupils are equal, round, and reactive to light. Cardiovascular:      Rate and Rhythm: Normal rate and regular rhythm. Pulses: Normal pulses. Heart sounds: Normal heart sounds. Pulmonary:      Effort: Pulmonary effort is normal.      Comments: Minor coarse sounds   Abdominal:      General: Abdomen is flat. Bowel sounds are normal.      Palpations: Abdomen is soft. Musculoskeletal:         General: Normal range of motion. Cervical back: Normal range of motion and neck supple. Skin:     General: Skin is warm. Capillary Refill: Capillary refill takes less than 2 seconds. Neurological:      General: No focal deficit present. Mental Status: He is alert and oriented to person, place, and time.    Psychiatric:         Mood and Affect: Mood normal.

## 2023-10-02 ENCOUNTER — TELEPHONE (OUTPATIENT)
Dept: CARDIOLOGY CLINIC | Facility: CLINIC | Age: 63
End: 2023-10-02

## 2023-10-02 NOTE — TELEPHONE ENCOUNTER
Pt recovering from bronchitis and will reschedule Echo.  In the meantime, he asked to keep the appt with Dr Geneva Holland and will call for the results

## 2023-10-04 ENCOUNTER — OFFICE VISIT (OUTPATIENT)
Dept: CARDIAC SURGERY | Facility: CLINIC | Age: 63
End: 2023-10-04
Payer: COMMERCIAL

## 2023-10-04 VITALS
HEIGHT: 70 IN | OXYGEN SATURATION: 97 % | BODY MASS INDEX: 29.2 KG/M2 | HEART RATE: 64 BPM | WEIGHT: 204 LBS | DIASTOLIC BLOOD PRESSURE: 83 MMHG | SYSTOLIC BLOOD PRESSURE: 128 MMHG

## 2023-10-04 DIAGNOSIS — I49.3 PVC (PREMATURE VENTRICULAR CONTRACTION): Primary | ICD-10-CM

## 2023-10-04 DIAGNOSIS — I42.8 NONISCHEMIC CARDIOMYOPATHY (HCC): ICD-10-CM

## 2023-10-04 PROCEDURE — 99213 OFFICE O/P EST LOW 20 MIN: CPT | Performed by: INTERNAL MEDICINE

## 2023-10-04 NOTE — PROGRESS NOTES
Cardiology Follow Up Visit    Nisha Rendon  1960  [id number]  [address] [facility] [place]  [address] [phone]    1. PVC (premature ventricular contraction)  Lipid Panel with Direct LDL reflex    Comprehensive metabolic panel      2. Nonischemic cardiomyopathy (HCC)  Lipid Panel with Direct LDL reflex    Comprehensive metabolic panel              Discussion/Summary:    1. h/o nonischemic cardiomyopathy, LVEF 48-55%, most recently normal by echo this month. 2. PVCs, asymptomatic, holter <1% burden      Plan:    Patient overall doing well. Stable cardiovascular status. Encouraged sensible/Mediterranean type diet and daily activity/exercise. Echocardiogram ordered given history of transient nonischemic cardiomyopathy. Routine CMP and lipids. Interval History:     59year-old male history of mild nonischemic cardiomyopathy. Ejection fraction slightly low in the upper 40 to low 50 range . Dates back many years underwent cardiac catheterization on [address] [place] [address].    Had an echocardiogram EF 52%, prior 40% by cardiac MRI at one point in years past. Recently 55% by echo this month. There is no evidence of scarring inflammation or infiltrative disease on his cardiac MRI     History of PVCs which were asymptomatic, holter this month <1% pvc burden. Unable to tolerate beta blockers, Ace inhibitors and prefers not using. Elevated blood pressure at dentist office he stated and systolic in the 207 range. No prior history of elevated high blood pressure. No complaints.     Patient Active Problem List   Diagnosis    Chronic lumbar pain    Acid reflux    Esophageal reflux    Low vitamin D level    Gout    URTI (acute upper respiratory infection)    Influenza    Visual disturbance    Shoulder joint pain    Epicondylitis, lateral, right    Primary insomnia    Well adult exam Precordial pain    Sinus bradycardia    PVC (premature ventricular contraction)    Bronchitis    History of cardiomyopathy    Nonischemic cardiomyopathy (HCC)    Lumbar pain    Anxiety    Chronic pain of both knees    Continuous opioid dependence ([address])    Broken ribs    Skin neoplasm     Past Medical History:   Diagnosis Date    Anxiety     BPH (benign prostatic hyperplasia)     Broken ribs     Elevated LDL cholesterol level 09/18/2018    GERD (gastroesophageal reflux disease)     Gout     Herpes     simplex - lower lip     Hx of splenomegaly     mild    Hyperuricemia     Idiopathic cardiomyopathy (HCC)     Parapelvic renal cyst     Sinus bradycardia      Social History     Socioeconomic History    Marital status: /Civil Union     Spouse name: Not on file    Number of children: Not on file    Years of education: Not on file    Highest education level: Not on file   Occupational History    Not on file   Tobacco Use    Smoking status: Never    Smokeless tobacco: Never   Vaping Use    Vaping Use: Never used   Substance and Sexual Activity    Alcohol use: Yes     Comment: socially    Drug use: No    Sexual activity: Yes     Partners: Female     Birth control/protection: None   Other Topics Concern    Not on file   Social History Narrative    Not on file     Social Determinants of Health     Financial Resource Strain: Not on file   Food Insecurity: Not on file   Transportation Needs: Not on file   Physical Activity: Not on file   Stress: Not on file   Social Connections: Not on file   Intimate Partner Violence: Not on file   Housing Stability: Not on file      Family History   Problem Relation Age of Onset    Cancer Family     Diabetes Family         mellitus     Heart disease Family     Hypertension Family     Parkinsonism Family     Parkinsonism Mother     Heart attack Father     Kidney disease Father     Diabetes Paternal Grandmother     Arthritis Maternal Grandmother      Past Surgical History:   Procedure Laterality Date    CARDIAC CATHETERIZATION  08/30/2013    COLONOSCOPY      ROTATOR CUFF REPAIR Right     UMBILICAL HERNIA REPAIR      WRIST SURGERY Left        Current Outpatient Medications:     allopurinol (ZYLOPRIM) 300 mg tablet, TAKE ONE TABLET BY MOUTH EVERY OTHER DAY, Disp: 45 tablet, Rfl: 1    benzonatate (TESSALON PERLES) 100 mg capsule, Take 1 capsule (100 mg total) by mouth 3 (three) times a day as needed for cough, Disp: 20 capsule, Rfl: 0    cholecalciferol (VITAMIN D3) 1,000 units tablet, Take by mouth daily , Disp: , Rfl:     doxycycline (ADOXA) 100 MG tablet, Take 1 tablet (100 mg total) by mouth 2 (two) times a day for 10 days, Disp: 20 tablet, Rfl: 0    escitalopram (LEXAPRO) 20 mg tablet, TAKE 1 TABLET BY MOUTH EVERY DAY, Disp: 90 tablet, Rfl: 0    Fluticasone-Salmeterol (Advair Diskus) 100-50 mcg/dose inhaler, Inhale 1 puff 2 (two) times a day Rinse mouth after use., Disp: 60 blister, Rfl: 0    LORazepam (ATIVAN) 1 mg tablet, Take 1 tablet (1 mg total) by mouth as needed (as needed), Disp: 30 tablet, Rfl: 0    Multiple Vitamin (multivitamin) capsule, Take 1 capsule by mouth daily, Disp: , Rfl:     pantoprazole (PROTONIX) 40 mg tablet, TAKE 1 TABLET BY MOUTH TWICE A DAY (Patient not taking: Reported on 10/4/2023), Disp: 180 tablet, Rfl: 0  No Known Allergies      Social, Family, Medication history reviewed and updated as necessary      Labs:     , , HDL 45, LDL 80, 12/19/2022 -[facility] labs    Lab Results   Component Value Date     02/11/2015    K 4.6 02/29/2020     (H) 02/29/2020    CO2 27 02/29/2020    BUN 18 02/29/2020    CREATININE 1.14 02/29/2020    CREATININE 0.99 08/12/2019    GLUCOSE 84 02/11/2015    CALCIUM 9.1 02/29/2020       Lab Results   Component Value Date    WBC 5.22 08/12/2019    HGB 14.3 08/12/2019    HGB 14.3 02/11/2015    HCT 43.2 08/12/2019    HCT 41.4 02/11/2015     08/12/2019     02/11/2015       Lab Results   Component Value Date    CHOL 161 02/11/2015    CHOL 179 09/03/2014     Lab Results   Component Value Date    HDL 56 08/12/2019    HDL 48 02/11/2015     Lab Results   Component Value Date    LDLCALC 92 08/12/2019    LDLCALC 81 02/11/2015     No results found for: "LDLDIRECT"          Lab Results   Component Value Date    TRIG 77 08/12/2019    TRIG 162 02/11/2015       Lab Results   Component Value Date    ALT 20 08/12/2019    ALT 24 02/11/2015    AST 8 08/12/2019    AST 13 02/11/2015    ALKPHOS 48 08/12/2019    ALKPHOS 69 02/11/2015       No results found for: "INR"    No results found for: "NTBNP"    No results found for: "HGBA1C"        Imaging: Reviewed in epic        Review of Systems:  14 systems reviewed and negative with exception of the above        PHYSICAL EXAM:      Vitals:    10/04/23 0841   BP: 128/83   Pulse: 64   SpO2: 97%     Body mass index is 29.27 kg/m². Weight (last 2 days)       Date/Time Weight    10/04/23 0841 92.5 (204)              Gen: No acute distress  HEENT: anicteric, mucous membranes moist  Neck: supple, no jugular venous distention, or carotid bruit  Heart: regular, normal s1 and s2, no murmur/rub or gallop  Lungs :clear to auscultation bilaterally, no rales/rhonchi or wheeze  Abdomen: soft nontender, normoactive bowel sounds, no organomegaly  Ext: warm and perfused, normal femoral pulses, no edema, or clubbing  Skin: warm, no rashes  Neuro: AAO x 3, no focal findings  Psychiatric: normal affect  Musculoskeletal: no obvious joint deformities. This note was completed in part utilizing [employer] direct voice recognition software. Grammatical errors, random word insertion, spelling mistakes, and incomplete sentences may be an occasional consequence of the system secondary to software limitations, ambient noise and hardware issues. At the time of dictation, efforts were made to edit, clarify and /or correct errors.   Please read the chart carefully and recognize, using context, where substitutions have occurred. If you have any questions or concerns about the context, text or information contained within the body of this dictation, please contact myself, the provider, for further clarification.

## 2023-10-05 ENCOUNTER — OFFICE VISIT (OUTPATIENT)
Dept: FAMILY MEDICINE CLINIC | Facility: CLINIC | Age: 63
End: 2023-10-05
Payer: COMMERCIAL

## 2023-10-05 VITALS
TEMPERATURE: 98.1 F | HEART RATE: 75 BPM | HEIGHT: 70 IN | BODY MASS INDEX: 29.12 KG/M2 | DIASTOLIC BLOOD PRESSURE: 70 MMHG | OXYGEN SATURATION: 98 % | SYSTOLIC BLOOD PRESSURE: 102 MMHG | WEIGHT: 203.4 LBS | RESPIRATION RATE: 21 BRPM

## 2023-10-05 DIAGNOSIS — F11.20 CONTINUOUS OPIOID DEPENDENCE (HCC): ICD-10-CM

## 2023-10-05 DIAGNOSIS — M10.00 IDIOPATHIC GOUT, UNSPECIFIED CHRONICITY, UNSPECIFIED SITE: ICD-10-CM

## 2023-10-05 DIAGNOSIS — Z23 NEED FOR INFLUENZA VACCINATION: ICD-10-CM

## 2023-10-05 DIAGNOSIS — K21.00 GASTROESOPHAGEAL REFLUX DISEASE WITH ESOPHAGITIS WITHOUT HEMORRHAGE: Primary | ICD-10-CM

## 2023-10-05 DIAGNOSIS — F41.9 ANXIETY: ICD-10-CM

## 2023-10-05 PROCEDURE — 90471 IMMUNIZATION ADMIN: CPT

## 2023-10-05 PROCEDURE — 99214 OFFICE O/P EST MOD 30 MIN: CPT | Performed by: FAMILY MEDICINE

## 2023-10-05 PROCEDURE — 90686 IIV4 VACC NO PRSV 0.5 ML IM: CPT

## 2023-10-05 NOTE — PROGRESS NOTES
Assessment/Plan: Laboratory studies reviewed. Patient will labs prior to next visit. Refills will be given when needed. Patient flu shot. Follow-up in 6 months       Diagnoses and all orders for this visit:    Gastroesophageal reflux disease with esophagitis without hemorrhage  -     CBC and differential; Future  -     Comprehensive metabolic panel; Future  -     Lipid panel; Future  -     TSH, 3rd generation with Free T4 reflex; Future  -     Uric acid; Future    Anxiety  -     CBC and differential; Future  -     Comprehensive metabolic panel; Future  -     Lipid panel; Future  -     TSH, 3rd generation with Free T4 reflex; Future  -     Uric acid; Future    Idiopathic gout, unspecified chronicity, unspecified site  -     CBC and differential; Future  -     Comprehensive metabolic panel; Future  -     Lipid panel; Future  -     TSH, 3rd generation with Free T4 reflex; Future  -     Uric acid; Future    Continuous opioid dependence (HCC)            Subjective:        Patient ID: Patricia Rodrigues is a 59 y.o. male. Patient is here to follow-up on GERD, anxiety, gout. No gout attacks. Anxiety has been relatively stable. Reflux stable overall with occasional use of Tums. Patient off pantoprazole. The following portions of the patient's history were reviewed and updated as appropriate: allergies, current medications, past family history, past medical history, past social history, past surgical history and problem list.      Review of Systems   Constitutional: Negative. HENT: Negative. Eyes: Negative. Respiratory: Negative. Cardiovascular: Negative. Gastrointestinal: Negative. Endocrine: Negative. Genitourinary: Negative. Musculoskeletal: Negative. Skin: Negative. Allergic/Immunologic: Negative. Neurological: Negative. Hematological: Negative. Psychiatric/Behavioral: Negative. Objective:      BMI Counseling: Body mass index is 29.18 kg/m².  The BMI is above normal. Nutrition recommendations include encouraging healthy choices of fruits and vegetables. Exercise recommendations include moderate physical activity 150 minutes/week. Rationale for BMI follow-up plan is due to patient being overweight or obese. Depression Screening and Follow-up Plan: Clincally patient does not have depression. No treatment is required. /70 (BP Location: Left arm, Patient Position: Sitting, Cuff Size: Standard)   Pulse 75   Temp 98.1 °F (36.7 °C) (Temporal)   Resp 21   Ht 5' 10" (1.778 m)   Wt 92.3 kg (203 lb 6.4 oz)   SpO2 98%   BMI 29.18 kg/m²          Physical Exam  Vitals and nursing note reviewed. Constitutional:       General: He is not in acute distress. Appearance: Normal appearance. He is not ill-appearing, toxic-appearing or diaphoretic. HENT:      Head: Normocephalic and atraumatic. Right Ear: Tympanic membrane, ear canal and external ear normal. There is no impacted cerumen. Left Ear: Tympanic membrane, ear canal and external ear normal. There is no impacted cerumen. Nose: Nose normal. No congestion or rhinorrhea. Mouth/Throat:      Mouth: Mucous membranes are moist.      Pharynx: No oropharyngeal exudate or posterior oropharyngeal erythema. Eyes:      General: No scleral icterus. Right eye: No discharge. Left eye: No discharge. Conjunctiva/sclera: Conjunctivae normal.   Neck:      Vascular: No carotid bruit. Cardiovascular:      Rate and Rhythm: Normal rate and regular rhythm. Pulses: Normal pulses. Heart sounds: Normal heart sounds. No murmur heard. No friction rub. No gallop. Pulmonary:      Effort: Pulmonary effort is normal. No respiratory distress. Breath sounds: Normal breath sounds. No stridor. No wheezing, rhonchi or rales. Chest:      Chest wall: No tenderness. Musculoskeletal:         General: No swelling, tenderness, deformity or signs of injury.  Normal range of motion. Cervical back: Normal range of motion and neck supple. No rigidity. No muscular tenderness. Right lower leg: No edema. Left lower leg: No edema. Lymphadenopathy:      Cervical: No cervical adenopathy. Skin:     General: Skin is warm and dry. Capillary Refill: Capillary refill takes less than 2 seconds. Coloration: Skin is not jaundiced. Findings: No bruising, erythema, lesion or rash. Neurological:      Mental Status: He is alert and oriented to person, place, and time. Mental status is at baseline. Cranial Nerves: No cranial nerve deficit. Sensory: No sensory deficit. Motor: No weakness. Coordination: Coordination normal.      Gait: Gait normal.   Psychiatric:         Mood and Affect: Mood normal.         Behavior: Behavior normal.         Thought Content:  Thought content normal.         Judgment: Judgment normal.

## 2023-10-10 ENCOUNTER — HOSPITAL ENCOUNTER (OUTPATIENT)
Dept: NON INVASIVE DIAGNOSTICS | Facility: HOSPITAL | Age: 63
Discharge: HOME | End: 2023-10-10
Payer: COMMERCIAL

## 2023-10-10 VITALS
HEIGHT: 70 IN | HEART RATE: 43 BPM | DIASTOLIC BLOOD PRESSURE: 70 MMHG | SYSTOLIC BLOOD PRESSURE: 102 MMHG | BODY MASS INDEX: 28.63 KG/M2 | WEIGHT: 200 LBS

## 2023-10-10 DIAGNOSIS — I42.8 NONISCHEMIC CARDIOMYOPATHY (HCC): ICD-10-CM

## 2023-10-10 DIAGNOSIS — I49.3 PVC (PREMATURE VENTRICULAR CONTRACTION): ICD-10-CM

## 2023-10-10 LAB
AORTIC ROOT: 3.7 CM
APICAL FOUR CHAMBER EJECTION FRACTION: 51 %
E WAVE DECELERATION TIME: 473 MS
E/A RATIO: 0.94
FRACTIONAL SHORTENING: 27 (ref 28–44)
INTERVENTRICULAR SEPTUM IN DIASTOLE (PARASTERNAL SHORT AXIS VIEW): 0.9 CM
INTERVENTRICULAR SEPTUM: 0.9 CM (ref 0.6–1.1)
LAAS-AP2: 21.9 CM2
LAAS-AP4: 21.9 CM2
LEFT ATRIUM SIZE: 3.9 CM
LEFT ATRIUM VOLUME (MOD BIPLANE): 69 ML
LEFT ATRIUM VOLUME INDEX (MOD BIPLANE): 32.9 ML/M2
LEFT INTERNAL DIMENSION IN SYSTOLE: 4 CM (ref 2.1–4)
LEFT VENTRICULAR INTERNAL DIMENSION IN DIASTOLE: 5.5 CM (ref 3.5–6)
LEFT VENTRICULAR POSTERIOR WALL IN END DIASTOLE: 1 CM
LEFT VENTRICULAR STROKE VOLUME: 79 ML
LVSV (TEICH): 79 ML
MV E'TISSUE VEL-SEP: 9 CM/S
MV PEAK A VEL: 0.34 M/S
MV PEAK E VEL: 32 CM/S
MV STENOSIS PRESSURE HALF TIME: 137 MS
MV VALVE AREA P 1/2 METHOD: 1.61
RA PRESSURE ESTIMATED: 3 MMHG
RIGHT ATRIUM AREA SYSTOLE A4C: 18 CM2
RIGHT VENTRICLE ID DIMENSION: 3.2 CM
RV PSP: 24 MMHG
SL CV LEFT ATRIUM LENGTH A2C: 5.5 CM
SL CV LV EF: 45
SL CV PED ECHO LEFT VENTRICLE DIASTOLIC VOLUME (MOD BIPLANE) 2D: 147 ML
SL CV PED ECHO LEFT VENTRICLE SYSTOLIC VOLUME (MOD BIPLANE) 2D: 69 ML
TR MAX PG: 21 MMHG
TR PEAK VELOCITY: 2.3 M/S
TRICUSPID ANNULAR PLANE SYSTOLIC EXCURSION: 2.4 CM
TRICUSPID VALVE PEAK REGURGITATION VELOCITY: 2.3 M/S

## 2023-10-10 PROCEDURE — 93306 TTE W/DOPPLER COMPLETE: CPT

## 2023-10-16 ENCOUNTER — APPOINTMENT (OUTPATIENT)
Dept: LAB | Facility: MEDICAL CENTER | Age: 63
End: 2023-10-16
Payer: COMMERCIAL

## 2023-10-16 DIAGNOSIS — I42.8 NONISCHEMIC CARDIOMYOPATHY (HCC): ICD-10-CM

## 2023-10-16 DIAGNOSIS — I49.3 PVC (PREMATURE VENTRICULAR CONTRACTION): ICD-10-CM

## 2023-10-16 DIAGNOSIS — M10.00 IDIOPATHIC GOUT, UNSPECIFIED CHRONICITY, UNSPECIFIED SITE: ICD-10-CM

## 2023-10-16 DIAGNOSIS — K21.00 GASTROESOPHAGEAL REFLUX DISEASE WITH ESOPHAGITIS WITHOUT HEMORRHAGE: ICD-10-CM

## 2023-10-16 DIAGNOSIS — F41.9 ANXIETY: ICD-10-CM

## 2023-10-18 ENCOUNTER — APPOINTMENT (OUTPATIENT)
Dept: LAB | Facility: MEDICAL CENTER | Age: 63
End: 2023-10-18
Payer: COMMERCIAL

## 2023-10-18 DIAGNOSIS — I42.8 NONISCHEMIC CARDIOMYOPATHY (HCC): ICD-10-CM

## 2023-10-18 LAB
ALBUMIN SERPL BCP-MCNC: 4.1 G/DL (ref 3.5–5)
ALP SERPL-CCNC: 47 U/L (ref 34–104)
ALT SERPL W P-5'-P-CCNC: 21 U/L (ref 7–52)
ANION GAP SERPL CALCULATED.3IONS-SCNC: 7 MMOL/L
AST SERPL W P-5'-P-CCNC: 17 U/L (ref 13–39)
BASOPHILS # BLD AUTO: 0.02 THOUSANDS/ÂΜL (ref 0–0.1)
BASOPHILS NFR BLD AUTO: 0 % (ref 0–1)
BILIRUB SERPL-MCNC: 0.89 MG/DL (ref 0.2–1)
BUN SERPL-MCNC: 15 MG/DL (ref 5–25)
CALCIUM SERPL-MCNC: 9.5 MG/DL (ref 8.4–10.2)
CHLORIDE SERPL-SCNC: 104 MMOL/L (ref 96–108)
CHOLEST SERPL-MCNC: 161 MG/DL
CO2 SERPL-SCNC: 28 MMOL/L (ref 21–32)
CREAT SERPL-MCNC: 1.01 MG/DL (ref 0.6–1.3)
EOSINOPHIL # BLD AUTO: 0.03 THOUSAND/ÂΜL (ref 0–0.61)
EOSINOPHIL NFR BLD AUTO: 1 % (ref 0–6)
ERYTHROCYTE [DISTWIDTH] IN BLOOD BY AUTOMATED COUNT: 12.6 % (ref 11.6–15.1)
GFR SERPL CREATININE-BSD FRML MDRD: 79 ML/MIN/1.73SQ M
GLUCOSE P FAST SERPL-MCNC: 87 MG/DL (ref 65–99)
HCT VFR BLD AUTO: 45 % (ref 36.5–49.3)
HDLC SERPL-MCNC: 45 MG/DL
HGB BLD-MCNC: 15.1 G/DL (ref 12–17)
IMM GRANULOCYTES # BLD AUTO: 0.01 THOUSAND/UL (ref 0–0.2)
IMM GRANULOCYTES NFR BLD AUTO: 0 % (ref 0–2)
LDLC SERPL CALC-MCNC: 99 MG/DL (ref 0–100)
LYMPHOCYTES # BLD AUTO: 1.71 THOUSANDS/ÂΜL (ref 0.6–4.47)
LYMPHOCYTES NFR BLD AUTO: 34 % (ref 14–44)
MCH RBC QN AUTO: 34.1 PG (ref 26.8–34.3)
MCHC RBC AUTO-ENTMCNC: 33.6 G/DL (ref 31.4–37.4)
MCV RBC AUTO: 102 FL (ref 82–98)
MONOCYTES # BLD AUTO: 0.52 THOUSAND/ÂΜL (ref 0.17–1.22)
MONOCYTES NFR BLD AUTO: 10 % (ref 4–12)
NEUTROPHILS # BLD AUTO: 2.82 THOUSANDS/ÂΜL (ref 1.85–7.62)
NEUTS SEG NFR BLD AUTO: 55 % (ref 43–75)
NRBC BLD AUTO-RTO: 0 /100 WBCS
PLATELET # BLD AUTO: 213 THOUSANDS/UL (ref 149–390)
PMV BLD AUTO: 9.9 FL (ref 8.9–12.7)
POTASSIUM SERPL-SCNC: 4.8 MMOL/L (ref 3.5–5.3)
PROT SERPL-MCNC: 7.1 G/DL (ref 6.4–8.4)
RBC # BLD AUTO: 4.43 MILLION/UL (ref 3.88–5.62)
SODIUM SERPL-SCNC: 139 MMOL/L (ref 135–147)
TRIGL SERPL-MCNC: 86 MG/DL
TSH SERPL DL<=0.05 MIU/L-ACNC: 3.4 UIU/ML (ref 0.45–4.5)
URATE SERPL-MCNC: 6.8 MG/DL (ref 3.5–8.5)
WBC # BLD AUTO: 5.11 THOUSAND/UL (ref 4.31–10.16)

## 2023-10-18 PROCEDURE — 84550 ASSAY OF BLOOD/URIC ACID: CPT

## 2023-10-18 PROCEDURE — 80061 LIPID PANEL: CPT

## 2023-10-18 PROCEDURE — 80053 COMPREHEN METABOLIC PANEL: CPT

## 2023-10-18 PROCEDURE — 85025 COMPLETE CBC W/AUTO DIFF WBC: CPT

## 2023-10-18 PROCEDURE — 84443 ASSAY THYROID STIM HORMONE: CPT

## 2023-10-18 PROCEDURE — 36415 COLL VENOUS BLD VENIPUNCTURE: CPT

## 2024-02-21 PROBLEM — Z00.00 WELL ADULT EXAM: Status: RESOLVED | Noted: 2019-11-18 | Resolved: 2024-02-21

## 2024-02-21 PROBLEM — J11.1 INFLUENZA: Status: RESOLVED | Noted: 2018-03-02 | Resolved: 2024-02-21

## 2024-02-21 PROBLEM — J06.9 URTI (ACUTE UPPER RESPIRATORY INFECTION): Status: RESOLVED | Noted: 2018-03-01 | Resolved: 2024-02-21

## 2024-06-10 DIAGNOSIS — R07.2 PRECORDIAL PAIN: ICD-10-CM

## 2024-06-10 RX ORDER — LORAZEPAM 1 MG/1
1 TABLET ORAL AS NEEDED
Qty: 90 TABLET | Refills: 1 | Status: SHIPPED | OUTPATIENT
Start: 2024-06-10

## 2024-06-10 NOTE — TELEPHONE ENCOUNTER
[facility] pharmacy called requesting that the frequency for the lorazepam is changed to reflect a more precise directiion for ex. As needed ever 4 hours. Please revise and send again. Also because [place] pharmacy [place] they need to verify that this was sent by the office and reason why it is being sent out of state. Please call back.

## 2024-06-11 ENCOUNTER — TELEPHONE (OUTPATIENT)
Age: 64
End: 2024-06-11

## 2024-06-11 NOTE — TELEPHONE ENCOUNTER
Patient wife called. The pharmacy in [place] need someone from Dr. Saleh office to call and verify the patient   LORazepam (ATIVAN) 1 mg tablet prescription.    Pharmacy    [facility] [address] [facility], [place] - [address] [facility]  [address] [facility] [place] [address]  Phone: [phone]  Fax: [phone]

## 2024-06-11 NOTE — TELEPHONE ENCOUNTER
I spoke to the pharmacist in [place] to verify Duncan's Ativan.  They cannot accept the si tab by mouth prn. She said she needs more definitive directions like one tablet by mouth daily, etc.  Can you give me more definitive directions please.

## 2024-06-12 NOTE — TELEPHONE ENCOUNTER
Left VM ( EST 9:34 am-pharmacy was not open yet) to Pharmacy( [facility] in [phone])today 06/12/2022 with direction for med to take 1 table daily as needed.

## 2024-06-12 NOTE — TELEPHONE ENCOUNTER
Kiran, [facility], [place] called to confirm frequency of medication - relayed providers message take 1 tablet daily as needed.  She thanked me for confirming  but had an additional question regarding the medication (ativan)  order - warm TSF to [facility] to assist further.

## 2024-06-20 NOTE — TELEPHONE ENCOUNTER
Pt wife called - Pt still has not received his medication from pharmacy.  They are still saying they do not like the way it is worded.    See previous comments.  Office did speak to pharmacy on 12th -, however they still have not released the medication.    Pt is requesting a call to pharmacy to see what the issue is. [phone].    Pt needs resolved by today/tomorrow as leaving [place] for a week or so and would like to get medication.    Notify pt to confirm everything is taken care.

## 2024-08-08 DIAGNOSIS — M10.00 IDIOPATHIC GOUT, UNSPECIFIED CHRONICITY, UNSPECIFIED SITE: ICD-10-CM

## 2024-08-08 RX ORDER — ALLOPURINOL 300 MG/1
300 TABLET ORAL EVERY OTHER DAY
Qty: 45 TABLET | Refills: 0 | Status: SHIPPED | OUTPATIENT
Start: 2024-08-08

## 2024-08-08 NOTE — TELEPHONE ENCOUNTER
Reason for call:   [x] Refill   [] Prior Auth  [x] Other: Requesting to send to pharmacy out of state.    Office:   [x] PCP/Provider - Luciano Gonzalez MD   [] Specialty/Provider -     Medication: allopurinol 300 mg    Dose/Frequency: 1 tab every other day / 45 tabs      Pharmacy: [facility] [address] [facility], [place] - [address] [facility]      Does the patient have enough for 3 days?   [] Yes   [x] No - Send as HP to POD

## 2024-10-23 ENCOUNTER — OFFICE VISIT (OUTPATIENT)
Age: 64
End: 2024-10-23
Payer: COMMERCIAL

## 2024-10-23 VITALS
DIASTOLIC BLOOD PRESSURE: 83 MMHG | WEIGHT: 194 LBS | HEIGHT: 70 IN | OXYGEN SATURATION: 98 % | HEART RATE: 60 BPM | SYSTOLIC BLOOD PRESSURE: 150 MMHG | BODY MASS INDEX: 27.77 KG/M2

## 2024-10-23 DIAGNOSIS — I49.3 PVC (PREMATURE VENTRICULAR CONTRACTION): Primary | ICD-10-CM

## 2024-10-23 DIAGNOSIS — I42.8 NONISCHEMIC CARDIOMYOPATHY (HCC): ICD-10-CM

## 2024-10-23 PROCEDURE — 99213 OFFICE O/P EST LOW 20 MIN: CPT | Performed by: INTERNAL MEDICINE

## 2024-10-23 RX ORDER — LISINOPRIL 10 MG/1
10 TABLET ORAL DAILY
Qty: 90 TABLET | Refills: 3 | Status: SHIPPED | OUTPATIENT
Start: 2024-10-23

## 2024-10-23 RX ORDER — CARVEDILOL 3.12 MG/1
3.12 TABLET ORAL 2 TIMES DAILY WITH MEALS
Qty: 180 TABLET | Refills: 3 | Status: SHIPPED | OUTPATIENT
Start: 2024-10-23

## 2024-10-23 NOTE — PROGRESS NOTES
Cardiology Follow Up Visit    [place]  1960  [id number]  [facility] [place]  [address]  [address]  [place] [address] [phone]    1. PVC (premature ventricular contraction)  POCT ECG    carvedilol (COREG) 3.125 mg tablet    lisinopril (ZESTRIL) 10 mg tablet    Echo complete w/ contrast if indicated    Holter monitor      2. Nonischemic cardiomyopathy (HCC)  carvedilol (COREG) 3.125 mg tablet    lisinopril (ZESTRIL) 10 mg tablet    Echo complete w/ contrast if indicated    Holter monitor              Discussion/Summary:    1. h/o nonischemic cardiomyopathy, LVEF 45-55%, most recently normal by echo this month.  MRI with idiopathic etiology 2020  2. PVCs, asymptomatic, holter <1% burden  3.  elevated blood pressure        Plan:    Patient overall doing well.  Stable cardiovascular status.  He is agreeable to start lisinopril and carvedilol.  Will start carvedilol 3.125 twice daily and lisinopril 10 mg daily.  Echocardiogram and Holter monitor ordered.    Interval History:     64 year-old male history of mild nonischemic cardiomyopathy.  Ejection fraction slightly low in the upper 40 to low 50 range .   Dates back many years underwent cardiac catheterization on [address] [place] [address].    Had an echocardiogram EF 52%, prior 40% by cardiac MRI at one point in years past. Recently 45% by echo last year    There is no evidence of scarring inflammation or infiltrative disease on his cardiac MRI     History of PVCs which were asymptomatic, holter this month <1% pvc burden.     Blockers and ACE inhibitors suggested but stated he was unable in the past.  He is here with his wife today but states he is open to retrying.    Elevated blood pressure on occasion.  Mostly controlled.    No complaints.    Patient Active Problem List   Diagnosis    Chronic lumbar pain    Acid reflux    Esophageal reflux    Low vitamin D level     Gout    Visual disturbance    Shoulder joint pain    Epicondylitis, lateral, right    Primary insomnia    Precordial pain    Sinus bradycardia    PVC (premature ventricular contraction)    Bronchitis    History of cardiomyopathy    Nonischemic cardiomyopathy (HCC)    Lumbar pain    Anxiety    Chronic pain of both knees    Continuous opioid dependence (HCC)    Broken ribs    Skin neoplasm     Past Medical History:   Diagnosis Date    Anxiety     BPH (benign prostatic hyperplasia)     Broken ribs     Cancer (HCC)     Elevated LDL cholesterol level 09/18/2018    GERD (gastroesophageal reflux disease)     Gout     Herpes     simplex - lower lip     Hx of splenomegaly     mild    Hyperuricemia     Idiopathic cardiomyopathy (HCC)     Parapelvic renal cyst     Sinus bradycardia      Social History     Socioeconomic History    Marital status: /Civil Union     Spouse name: Not on file    Number of children: Not on file    Years of education: Not on file    Highest education level: Not on file   Occupational History    Not on file   Tobacco Use    Smoking status: Never    Smokeless tobacco: Never   Vaping Use    Vaping status: Never Used   Substance and Sexual Activity    Alcohol use: Yes     Alcohol/week: 2.0 standard drinks of alcohol     Types: 2 Standard drinks or equivalent per week     Comment: socially    Drug use: No    Sexual activity: Yes     Partners: Female     Birth control/protection: None   Other Topics Concern    Not on file   Social History Narrative    Not on file     Social Determinants of Health     Financial Resource Strain: Not on file   Food Insecurity: Not on file   Transportation Needs: Not on file   Physical Activity: Not on file   Stress: Not on file   Social Connections: Not on file   Intimate Partner Violence: Not on file   Housing Stability: Not on file      Family History   Problem Relation Age of Onset    Cancer Family     Diabetes Family         mellitus     Heart disease Family      Hypertension Family     Parkinsonism Family     Parkinsonism Mother     Heart attack Father     Kidney disease Father     Heart disease Father     Cancer Father     Diabetes Paternal Grandmother     Arthritis Maternal Grandmother      Past Surgical History:   Procedure Laterality Date    CARDIAC CATHETERIZATION  08/30/2013    COLONOSCOPY      ROTATOR CUFF REPAIR Right     UMBILICAL HERNIA REPAIR      WRIST SURGERY Left        Current Outpatient Medications:     allopurinol (ZYLOPRIM) 300 mg tablet, Take 1 tablet (300 mg total) by mouth every other day, Disp: 45 tablet, Rfl: 0    carvedilol (COREG) 3.125 mg tablet, Take 1 tablet (3.125 mg total) by mouth 2 (two) times a day with meals, Disp: 180 tablet, Rfl: 3    cholecalciferol (VITAMIN D3) 1,000 units tablet, Take by mouth daily , Disp: , Rfl:     lisinopril (ZESTRIL) 10 mg tablet, Take 1 tablet (10 mg total) by mouth daily, Disp: 90 tablet, Rfl: 3    LORazepam (ATIVAN) 1 mg tablet, Take 1 tablet (1 mg total) by mouth as needed (as needed), Disp: 90 tablet, Rfl: 1    Multiple Vitamin (multivitamin) capsule, Take 1 capsule by mouth daily, Disp: , Rfl:     escitalopram (LEXAPRO) 20 mg tablet, TAKE 1 TABLET BY MOUTH EVERY DAY (Patient not taking: Reported on 10/23/2024), Disp: 90 tablet, Rfl: 0    Fluticasone-Salmeterol (Advair Diskus) 100-50 mcg/dose inhaler, Inhale 1 puff 2 (two) times a day Rinse mouth after use. (Patient not taking: Reported on 10/23/2024), Disp: 60 blister, Rfl: 0  No Known Allergies      Social, Family, Medication history reviewed and updated as necessary      Labs:     , , HDL 45, LDL 80, 12/19/2022 -[facility] labs    Lab Results   Component Value Date     02/11/2015    K 4.8 10/18/2023     10/18/2023    CO2 28 10/18/2023    BUN 15 10/18/2023    CREATININE 1.01 10/18/2023    CREATININE 0.96 12/19/2022    GLUCOSE 84 02/11/2015    CALCIUM 9.5 10/18/2023       Lab Results   Component Value Date    WBC 5.11 10/18/2023    HGB 15.1  "10/18/2023    HGB 14.3 08/12/2019    HCT 45.0 10/18/2023    HCT 43.2 08/12/2019     10/18/2023     08/12/2019       Lab Results   Component Value Date    CHOL 161 02/11/2015    CHOL 179 09/03/2014     Lab Results   Component Value Date    HDL 45 10/18/2023    HDL 56 08/12/2019     Lab Results   Component Value Date    LDLCALC 99 10/18/2023    LDLCALC 92 08/12/2019     No results found for: \"LDLDIRECT\"          Lab Results   Component Value Date    TRIG 86 10/18/2023    TRIG 77 08/12/2019       Lab Results   Component Value Date    ALT 21 10/18/2023    ALT 37 12/19/2022    AST 17 10/18/2023    AST 18 12/19/2022    ALKPHOS 47 10/18/2023    ALKPHOS 62 12/19/2022       No results found for: \"INR\"    No results found for: \"NTBNP\"    Lab Results   Component Value Date    HGBA1C 4.7 04/22/2018           Imaging: Reviewed in epic        Review of Systems:  14 systems reviewed and negative with exception of the above        PHYSICAL EXAM:      Vitals:    10/23/24 1030   BP: 150/83   Pulse: 60   SpO2: 98%       Body mass index is 27.84 kg/m².   Weight (last 2 days)       Date/Time Weight    10/23/24 1030 88 (194)              Gen: No acute distress  HEENT: anicteric, mucous membranes moist  Neck: supple, no jugular venous distention, or carotid bruit  Heart: regular, normal s1 and s2, no murmur/rub or gallop  Lungs :clear to auscultation bilaterally, no rales/rhonchi or wheeze  Abdomen: soft nontender, normoactive bowel sounds, no organomegaly  Ext: warm and perfused, normal femoral pulses, no edema, or clubbing  Skin: warm, no rashes  Neuro: AAO x 3, no focal findings  Psychiatric: normal affect  Musculoskeletal: no obvious joint deformities.        This note was completed in part utilizing [employer] direct voice recognition software.   Grammatical errors, random word insertion, spelling mistakes, and incomplete sentences may be an occasional consequence of the system secondary to software limitations, " ambient noise and hardware issues. At the time of dictation, efforts were made to edit, clarify and /or correct errors.  Please read the chart carefully and recognize, using context, where substitutions have occurred.  If you have any questions or concerns about the context, text or information contained within the body of this dictation, please contact myself, the provider, for further clarification.      Admission

## 2024-11-06 ENCOUNTER — OFFICE VISIT (OUTPATIENT)
Age: 64
End: 2024-11-06
Payer: COMMERCIAL

## 2024-11-06 VITALS
OXYGEN SATURATION: 99 % | HEART RATE: 71 BPM | SYSTOLIC BLOOD PRESSURE: 122 MMHG | TEMPERATURE: 97.4 F | HEIGHT: 70 IN | WEIGHT: 192.4 LBS | BODY MASS INDEX: 27.54 KG/M2 | DIASTOLIC BLOOD PRESSURE: 82 MMHG

## 2024-11-06 DIAGNOSIS — Z23 NEED FOR IMMUNIZATION AGAINST INFLUENZA: ICD-10-CM

## 2024-11-06 DIAGNOSIS — Z00.00 WELL ADULT EXAM: Primary | ICD-10-CM

## 2024-11-06 DIAGNOSIS — F11.20 CONTINUOUS OPIOID DEPENDENCE (HCC): ICD-10-CM

## 2024-11-06 DIAGNOSIS — N52.01 ERECTILE DYSFUNCTION DUE TO ARTERIAL INSUFFICIENCY: ICD-10-CM

## 2024-11-06 DIAGNOSIS — Z12.5 PROSTATE CANCER SCREENING: ICD-10-CM

## 2024-11-06 PROCEDURE — 99396 PREV VISIT EST AGE 40-64: CPT | Performed by: FAMILY MEDICINE

## 2024-11-06 PROCEDURE — 90471 IMMUNIZATION ADMIN: CPT

## 2024-11-06 PROCEDURE — 90673 RIV3 VACCINE NO PRESERV IM: CPT

## 2024-11-06 NOTE — PROGRESS NOTES
Assessment/Plan: Wellness exam done at this time.  Vaccines reviewed and up-to-date.  Patient may consider Shingrix in the future.  Flu shot given at this time.  Laboratory studies reviewed and up-to-date.  Prostate cancer screening up-to-date.  Colonoscopy up-to-date 2020.  Patient had laboratory studies done as well as PSA for prostate cancer screening and testosterone level for erectile dysfunction.  Patient will follow-up yearly or as needed.       Diagnoses and all orders for this visit:    Well adult exam  -     Comprehensive metabolic panel; Future  -     CBC and differential; Future  -     Lipid panel; Future  -     TSH, 3rd generation with Free T4 reflex; Future  -     Urinalysis with microscopic; Future    Need for immunization against influenza  -     Flublok (recombinant) 0.5 mL IM    Erectile dysfunction due to arterial insufficiency  -     Urinalysis with microscopic; Future  -     Testosterone, free, total; Future    Prostate cancer screening  -     PSA, Total Screen; Future    Continuous opioid dependence (HCC)            Subjective:        Patient ID: Duncan Park is a 64 y.o. male.      Patient is here for wellness exam.  Labs vaccines reviewed.  Patient up-to-date with PSA as well as colonoscopy.  Patient was normal frequency of voiding.  Patient with erectile issues at this time.          The following portions of the patient's history were reviewed and updated as appropriate: allergies, current medications, past family history, past medical history, past social history, past surgical history and problem list.      Review of Systems   Constitutional: Negative.    HENT: Negative.     Eyes: Negative.    Respiratory: Negative.     Cardiovascular: Negative.    Gastrointestinal: Negative.    Endocrine: Negative.    Genitourinary:  Positive for frequency.   Musculoskeletal: Negative.    Skin: Negative.    Allergic/Immunologic: Negative.    Neurological: Negative.    Hematological: Negative.   "  Psychiatric/Behavioral: Negative.             Objective:        Depression Screening and Follow-up Plan: Patient was screened for depression during today's encounter. They screened negative with a PHQ-2 score of 0.            /82 (BP Location: Left arm, Patient Position: Sitting, Cuff Size: Standard)   Pulse 71   Temp (!) 97.4 °F (36.3 °C) (Temporal)   Ht 5' 10\" (1.778 m)   Wt 87.3 kg (192 lb 6.4 oz)   SpO2 99%   BMI 27.61 kg/m²          Physical Exam  Vitals and nursing note reviewed.   Constitutional:       General: He is not in acute distress.     Appearance: Normal appearance. He is not ill-appearing, toxic-appearing or diaphoretic.   HENT:      Head: Normocephalic and atraumatic.      Right Ear: Tympanic membrane, ear canal and external ear normal. There is no impacted cerumen.      Left Ear: Tympanic membrane, ear canal and external ear normal. There is no impacted cerumen.      Nose: Nose normal. No congestion or rhinorrhea.      Mouth/Throat:      Mouth: Mucous membranes are moist.      Pharynx: No oropharyngeal exudate or posterior oropharyngeal erythema.   Eyes:      General: No scleral icterus.        Right eye: No discharge.         Left eye: No discharge.   Neck:      Vascular: No carotid bruit.   Cardiovascular:      Rate and Rhythm: Normal rate and regular rhythm.      Pulses: Normal pulses.      Heart sounds: Normal heart sounds. No murmur heard.     No friction rub. No gallop.   Pulmonary:      Effort: Pulmonary effort is normal. No respiratory distress.      Breath sounds: Normal breath sounds. No stridor. No wheezing, rhonchi or rales.   Chest:      Chest wall: No tenderness.   Musculoskeletal:         General: No swelling, tenderness, deformity or signs of injury. Normal range of motion.      Cervical back: Normal range of motion and neck supple. No rigidity. No muscular tenderness.      Right lower leg: No edema.      Left lower leg: No edema.   Lymphadenopathy:      Cervical: No " cervical adenopathy.   Skin:     General: Skin is warm and dry.      Capillary Refill: Capillary refill takes less than 2 seconds.      Coloration: Skin is not jaundiced.      Findings: No bruising, erythema, lesion or rash.   Neurological:      Mental Status: He is alert and oriented to person, place, and time.      Cranial Nerves: No cranial nerve deficit.      Sensory: No sensory deficit.      Motor: No weakness.      Coordination: Coordination normal.      Gait: Gait normal.   Psychiatric:         Mood and Affect: Mood normal.         Behavior: Behavior normal.         Thought Content: Thought content normal.         Judgment: Judgment normal.

## 2024-11-07 ENCOUNTER — APPOINTMENT (OUTPATIENT)
Dept: LAB | Facility: MEDICAL CENTER | Age: 64
End: 2024-11-07
Payer: COMMERCIAL

## 2024-11-07 DIAGNOSIS — Z00.00 WELL ADULT EXAM: ICD-10-CM

## 2024-11-07 DIAGNOSIS — Z12.5 PROSTATE CANCER SCREENING: ICD-10-CM

## 2024-11-07 DIAGNOSIS — N52.01 ERECTILE DYSFUNCTION DUE TO ARTERIAL INSUFFICIENCY: ICD-10-CM

## 2024-11-07 LAB
ALBUMIN SERPL BCG-MCNC: 4 G/DL (ref 3.5–5)
ALP SERPL-CCNC: 42 U/L (ref 34–104)
ALT SERPL W P-5'-P-CCNC: 10 U/L (ref 7–52)
ANION GAP SERPL CALCULATED.3IONS-SCNC: 4 MMOL/L (ref 4–13)
AST SERPL W P-5'-P-CCNC: 11 U/L (ref 13–39)
BACTERIA UR QL AUTO: NORMAL /HPF
BASOPHILS # BLD AUTO: 0.02 THOUSANDS/ÂΜL (ref 0–0.1)
BASOPHILS NFR BLD AUTO: 0 % (ref 0–1)
BILIRUB SERPL-MCNC: 0.79 MG/DL (ref 0.2–1)
BILIRUB UR QL STRIP: NEGATIVE
BUN SERPL-MCNC: 20 MG/DL (ref 5–25)
CALCIUM SERPL-MCNC: 9 MG/DL (ref 8.4–10.2)
CHLORIDE SERPL-SCNC: 106 MMOL/L (ref 96–108)
CHOLEST SERPL-MCNC: 169 MG/DL
CLARITY UR: CLEAR
CO2 SERPL-SCNC: 29 MMOL/L (ref 21–32)
COLOR UR: NORMAL
CREAT SERPL-MCNC: 0.91 MG/DL (ref 0.6–1.3)
EOSINOPHIL # BLD AUTO: 0.06 THOUSAND/ÂΜL (ref 0–0.61)
EOSINOPHIL NFR BLD AUTO: 1 % (ref 0–6)
ERYTHROCYTE [DISTWIDTH] IN BLOOD BY AUTOMATED COUNT: 12.6 % (ref 11.6–15.1)
GFR SERPL CREATININE-BSD FRML MDRD: 89 ML/MIN/1.73SQ M
GLUCOSE P FAST SERPL-MCNC: 95 MG/DL (ref 65–99)
GLUCOSE UR STRIP-MCNC: NEGATIVE MG/DL
HCT VFR BLD AUTO: 43.1 % (ref 36.5–49.3)
HDLC SERPL-MCNC: 42 MG/DL
HGB BLD-MCNC: 14.7 G/DL (ref 12–17)
HGB UR QL STRIP.AUTO: NEGATIVE
IMM GRANULOCYTES # BLD AUTO: 0.02 THOUSAND/UL (ref 0–0.2)
IMM GRANULOCYTES NFR BLD AUTO: 0 % (ref 0–2)
KETONES UR STRIP-MCNC: NEGATIVE MG/DL
LDLC SERPL CALC-MCNC: 115 MG/DL (ref 0–100)
LEUKOCYTE ESTERASE UR QL STRIP: NEGATIVE
LYMPHOCYTES # BLD AUTO: 1.12 THOUSANDS/ÂΜL (ref 0.6–4.47)
LYMPHOCYTES NFR BLD AUTO: 17 % (ref 14–44)
MCH RBC QN AUTO: 33.9 PG (ref 26.8–34.3)
MCHC RBC AUTO-ENTMCNC: 34.1 G/DL (ref 31.4–37.4)
MCV RBC AUTO: 99 FL (ref 82–98)
MONOCYTES # BLD AUTO: 0.56 THOUSAND/ÂΜL (ref 0.17–1.22)
MONOCYTES NFR BLD AUTO: 9 % (ref 4–12)
NEUTROPHILS # BLD AUTO: 4.69 THOUSANDS/ÂΜL (ref 1.85–7.62)
NEUTS SEG NFR BLD AUTO: 73 % (ref 43–75)
NITRITE UR QL STRIP: NEGATIVE
NON-SQ EPI CELLS URNS QL MICRO: NORMAL /HPF
NONHDLC SERPL-MCNC: 127 MG/DL
NRBC BLD AUTO-RTO: 0 /100 WBCS
PH UR STRIP.AUTO: 6 [PH]
PLATELET # BLD AUTO: 165 THOUSANDS/UL (ref 149–390)
PMV BLD AUTO: 10.1 FL (ref 8.9–12.7)
POTASSIUM SERPL-SCNC: 5 MMOL/L (ref 3.5–5.3)
PROT SERPL-MCNC: 6.8 G/DL (ref 6.4–8.4)
PROT UR STRIP-MCNC: NEGATIVE MG/DL
PSA SERPL-MCNC: 0.25 NG/ML (ref 0–4)
RBC # BLD AUTO: 4.34 MILLION/UL (ref 3.88–5.62)
RBC #/AREA URNS AUTO: NORMAL /HPF
SODIUM SERPL-SCNC: 139 MMOL/L (ref 135–147)
SP GR UR STRIP.AUTO: 1.02 (ref 1–1.03)
TRIGL SERPL-MCNC: 62 MG/DL
TSH SERPL DL<=0.05 MIU/L-ACNC: 3.27 UIU/ML (ref 0.45–4.5)
UROBILINOGEN UR STRIP-ACNC: <2 MG/DL
WBC # BLD AUTO: 6.47 THOUSAND/UL (ref 4.31–10.16)
WBC #/AREA URNS AUTO: NORMAL /HPF

## 2024-11-07 PROCEDURE — 84402 ASSAY OF FREE TESTOSTERONE: CPT

## 2024-11-07 PROCEDURE — 84443 ASSAY THYROID STIM HORMONE: CPT

## 2024-11-07 PROCEDURE — 81001 URINALYSIS AUTO W/SCOPE: CPT

## 2024-11-07 PROCEDURE — 84403 ASSAY OF TOTAL TESTOSTERONE: CPT

## 2024-11-07 PROCEDURE — 80053 COMPREHEN METABOLIC PANEL: CPT

## 2024-11-07 PROCEDURE — G0103 PSA SCREENING: HCPCS

## 2024-11-07 PROCEDURE — 85025 COMPLETE CBC W/AUTO DIFF WBC: CPT

## 2024-11-07 PROCEDURE — 80061 LIPID PANEL: CPT

## 2024-11-07 PROCEDURE — 36415 COLL VENOUS BLD VENIPUNCTURE: CPT

## 2024-11-08 LAB
TESTOST FREE SERPL-MCNC: 11.7 PG/ML (ref 6.6–18.1)
TESTOST SERPL-MCNC: 310 NG/DL (ref 264–916)

## 2024-11-11 ENCOUNTER — TELEPHONE (OUTPATIENT)
Age: 64
End: 2024-11-11

## 2024-11-11 NOTE — TELEPHONE ENCOUNTER
Joe Saleh, DO to [facility]  1 hour ago  Viagra 50 mg daily as needed dispense 10 pills with 5 refills     [email or web address] message sent to patient

## 2024-11-11 NOTE — TELEPHONE ENCOUNTER
Pt called stated that his blood work came back normal and at the appt Dr. Saleh stated if they come back normal he might want to do something else.    Pt would like to know what the next steps might be.    Please advise

## 2024-11-15 ENCOUNTER — TELEPHONE (OUTPATIENT)
Age: 64
End: 2024-11-15

## 2024-11-15 DIAGNOSIS — N52.01 ERECTILE DYSFUNCTION DUE TO ARTERIAL INSUFFICIENCY: Primary | ICD-10-CM

## 2024-11-15 RX ORDER — SILDENAFIL 50 MG/1
50 TABLET, FILM COATED ORAL DAILY PRN
Qty: 10 TABLET | Refills: 5 | Status: SHIPPED | OUTPATIENT
Start: 2024-11-15

## 2024-11-15 NOTE — TELEPHONE ENCOUNTER
Pt called and states that he still has not received the VIAGARA to his pharmacy.       Please send to:  [facility]  [address]  [facility]

## 2024-11-15 NOTE — TELEPHONE ENCOUNTER
Reason for call:   [x] Prior Auth  [] Other:     Medication: sildenafil (VIAGRA) 50 MG     Dose/Frequency: Take 1 tablet (50 mg total) by mouth daily as needed for erectile dysfunction     Quantity: 10    Ordering Provider:   PCP/Provider - Demian Ortez MD          [employer]

## 2024-11-18 NOTE — TELEPHONE ENCOUNTER
PA for sildenafil (VIAGRA) 50 MG SUBMITTED to [employer]     via    []CMM-KEY:   [x]Surescripts-Case ID #   []Availity-Auth ID # NDC #   []Faxed to plan   []Other website   []Phone call Case ID #     []PA sent as URGENT    All office notes, labs and other pertaining documents and studies sent. Clinical questions answered. Awaiting determination from insurance company.     Turnaround time for your insurance to make a decision on your Prior Authorization can take 7-21 business days.

## 2024-11-19 DIAGNOSIS — M10.00 IDIOPATHIC GOUT, UNSPECIFIED CHRONICITY, UNSPECIFIED SITE: ICD-10-CM

## 2024-11-19 RX ORDER — ALLOPURINOL 300 MG/1
300 TABLET ORAL EVERY OTHER DAY
Qty: 45 TABLET | Refills: 0 | Status: SHIPPED | OUTPATIENT
Start: 2024-11-19

## 2024-11-19 NOTE — TELEPHONE ENCOUNTER
Patient is in [place] for a few months and would like script sent there.     Reason for call:   [x] Refill   [] Prior Auth  [] Other:     Office:   [x] PCP/Provider - [facility]  Authorized By: Joe Saleh DO  [] Specialty/Provider -     Medication: allopurinol (ZYLOPRIM) 300 mg tablet     Dose/Frequency: Take 1 tablet (300 mg total) by mouth every other day     Quantity: 45 tablet     Pharmacy: [facility] #[id number] - [place], [place] - [address] [place] [address]     Does the patient have enough for 3 days?   [x] Yes   [] No - Send as HP to POD

## 2024-11-20 NOTE — TELEPHONE ENCOUNTER
PA for sildenafil (VIAGRA) 50 MG DENIED    Reason:(Screenshot if applicable)        Message sent to office clinical pool Yes    Denial letter scanned into Media Yes    Appeal started No (Provider will need to decide if appeal is warranted and send clinical documentation to Prior Authorization Team for initiation.)    **Please follow up with your patient regarding denial and next steps**

## 2024-12-06 PROBLEM — Z00.00 WELL ADULT EXAM: Status: RESOLVED | Noted: 2024-11-06 | Resolved: 2024-12-06

## 2024-12-16 ENCOUNTER — TELEPHONE (OUTPATIENT)
Age: 64
End: 2024-12-16

## 2024-12-16 NOTE — TELEPHONE ENCOUNTER
Patients wife called and stated she was checking on the status of an update on patients bill. Patients wife stated she had called on 12/11 and had spoken to multiple people. Patients wife stated the bill needs different diagnosis codes listed in order for the labs to be covered due to patients insurance. Patients wife stated she was awaiting a phone call for an update however unfortunately did not receive one. Upon review of patients chart I unfortunately did not see any documentation related to this concern. I spoke to office clerical and they had taken over the call.

## 2024-12-19 ENCOUNTER — NURSE TRIAGE (OUTPATIENT)
Age: 64
End: 2024-12-19

## 2024-12-19 NOTE — TELEPHONE ENCOUNTER
"Reason for Conversation: Patient reports feeling lightheaded mostly when he bends over and then stands up.Symptoms began around Thanksgiving when he also noticed his BP and HR were lower than usual.      VS Recent BPs and HR:  103/65, 106/66. 105/69 with HR in low 50s.  BP taken on an automatic upper arm BP cuff after meds in AM.    Pain: No    Risk Factors: Sinus David, PVCs, Nonischemic cardiomyopathy    Recent relevant testing and date of testing: Patient has outstanding orders for an echo and holter monitor    Medication: Lisinopril 10 mg daily, Carvedilol 3.125 BID    Upcoming Office Visit: No    Last Office Visit: 10/23/24.           Reason for Disposition   Taking a medicine that could cause dizziness (e.g., blood pressure medications, diuretics)    Answer Assessment - Initial Assessment Questions  1. DESCRIPTION: \"Describe your dizziness.\"      Patient feels lightheaded with change in position.  Mostly if bending over, then stands up, he will feel dizzy  on standing)        4. SEVERITY: \"How bad is it?\"  \"Do you feel like you are going to faint?\" \"Can you stand and walk?\"      Dizziness resolves after a very short time after standing up straight  5. ONSET:  \"When did the dizziness begin?\"      Around Thanksgiving  6. AGGRAVATING FACTORS: \"Does anything make it worse?\" (e.g., standing, change in head position)      Only change in position    Protocols used: Dizziness-Adult-[place]    "

## 2025-02-12 DIAGNOSIS — M10.00 IDIOPATHIC GOUT, UNSPECIFIED CHRONICITY, UNSPECIFIED SITE: ICD-10-CM

## 2025-02-12 NOTE — TELEPHONE ENCOUNTER
Reason for call:   [x] Refill   [] Prior Auth  [] Other:     Office:   [x] PCP/Provider - Joe Saleh,    [] Specialty/Provider -     Medication:     allopurinol (ZYLOPRIM) 300 mg tablet       Dose/Frequency:     300 mg, Oral, Every other day       Quantity: 45    Pharmacy: [id number] - [place] - [address]     Does the patient have enough for 3 days?   [x] Yes   [] No - Send as HP to POD

## 2025-02-13 RX ORDER — ALLOPURINOL 300 MG/1
300 TABLET ORAL EVERY OTHER DAY
Qty: 45 TABLET | Refills: 1 | Status: SHIPPED | OUTPATIENT
Start: 2025-02-13

## 2025-08-15 ENCOUNTER — VBI (OUTPATIENT)
Dept: ADMINISTRATIVE | Facility: OTHER | Age: 65
End: 2025-08-15